# Patient Record
Sex: FEMALE | Race: WHITE | NOT HISPANIC OR LATINO | Employment: FULL TIME | URBAN - METROPOLITAN AREA
[De-identification: names, ages, dates, MRNs, and addresses within clinical notes are randomized per-mention and may not be internally consistent; named-entity substitution may affect disease eponyms.]

---

## 2017-06-07 ENCOUNTER — ALLSCRIPTS OFFICE VISIT (OUTPATIENT)
Dept: OTHER | Facility: OTHER | Age: 57
End: 2017-06-07

## 2017-06-07 DIAGNOSIS — E11.9 TYPE 2 DIABETES MELLITUS WITHOUT COMPLICATIONS (HCC): ICD-10-CM

## 2017-06-07 DIAGNOSIS — E55.9 VITAMIN D DEFICIENCY: ICD-10-CM

## 2017-06-07 DIAGNOSIS — E78.5 HYPERLIPIDEMIA: ICD-10-CM

## 2017-06-12 ENCOUNTER — ALLSCRIPTS OFFICE VISIT (OUTPATIENT)
Dept: OTHER | Facility: OTHER | Age: 57
End: 2017-06-12

## 2018-01-09 NOTE — MISCELLANEOUS
Provider Comments  Provider Comments:   Dear Oswaldo Tejada had a scheduled appointment at our office for 12/19/2016 but were unable to keep  We attempted to call you back but were unable to reach you  It is very important that you follow up with us so that we can assess your physical and nutritional safety after your surgery  Please call our office at [phone] to reschedule your appointment         Sincerely,     [facility]          Signatures   Electronically signed by : Chip Brice, ; Dec 19 2016 10:58AM EST                       (Author)

## 2018-01-14 VITALS
SYSTOLIC BLOOD PRESSURE: 146 MMHG | WEIGHT: 231.38 LBS | HEART RATE: 74 BPM | DIASTOLIC BLOOD PRESSURE: 82 MMHG | BODY MASS INDEX: 34.27 KG/M2 | HEIGHT: 69 IN

## 2018-01-15 VITALS
HEART RATE: 90 BPM | BODY MASS INDEX: 34.14 KG/M2 | DIASTOLIC BLOOD PRESSURE: 80 MMHG | HEIGHT: 69 IN | WEIGHT: 230.5 LBS | RESPIRATION RATE: 16 BRPM | SYSTOLIC BLOOD PRESSURE: 142 MMHG

## 2018-01-16 NOTE — RESULT NOTES
Dear Sienna Johnson,   Your test results have returned and are listed below:      Discussion/Summary  Your results show some abnormalities and are improved compared to the last results  Vitamin D has improved from 18 9 mg/dL to 24 1 mg/dL, but is still low, which can affect your bone health  Recommend that you take 5000 IU of vitamin D3 per day, which is found over the counter  In addition, you need to take 1200 to 1500 mg of calcium per day, in divided doses of 500 to 600 mg each  Your results will be checked again at your annual appointment  Please keep your regularly scheduled follow-up appointment  If you do not have a follow-up scheduled, please call the office to schedule a follow-up visit  If you have any questions, please don't hesitate to call the office  Sincerely,      [address] [address] Dietetic Intern    Electronically signed by : RUBEN Lewis; Apr 6 2016 11:09AM EST                       (Author)    Electronically signed by :  JONO Lane ; Apr 6 2016 11:51AM EST

## 2018-05-07 ENCOUNTER — TELEPHONE (OUTPATIENT)
Dept: BARIATRICS | Facility: CLINIC | Age: 58
End: 2018-05-07

## 2018-05-07 NOTE — TELEPHONE ENCOUNTER
----- Message from Peg Sandhoff, RN sent at 5/3/2018  2:05 PM EDT -----  Regarding: 3 year f/u  Please call patient to schedule 3 year follow up appointment

## 2018-05-08 DIAGNOSIS — I10 BENIGN ESSENTIAL HTN: Primary | ICD-10-CM

## 2018-05-09 RX ORDER — LISINOPRIL 5 MG/1
TABLET ORAL
Qty: 30 TABLET | Refills: 4 | Status: SHIPPED | OUTPATIENT
Start: 2018-05-09 | End: 2018-08-31 | Stop reason: SDUPTHER

## 2018-05-11 ENCOUNTER — TELEPHONE (OUTPATIENT)
Dept: BARIATRICS | Facility: CLINIC | Age: 58
End: 2018-05-11

## 2018-05-11 NOTE — TELEPHONE ENCOUNTER
----- Message from Colleen Brian RN sent at 5/3/2018  2:05 PM EDT -----  Regarding: 3 year f/u  Please call patient to schedule 3 year follow up appointment

## 2018-08-31 DIAGNOSIS — I10 BENIGN ESSENTIAL HTN: ICD-10-CM

## 2018-08-31 RX ORDER — LISINOPRIL 5 MG/1
TABLET ORAL
Qty: 30 TABLET | Refills: 11 | Status: SHIPPED | OUTPATIENT
Start: 2018-08-31 | End: 2019-02-14 | Stop reason: SDUPTHER

## 2018-09-08 DIAGNOSIS — I42.9 CARDIOMYOPATHY (HCC): Primary | ICD-10-CM

## 2018-09-11 RX ORDER — METOPROLOL SUCCINATE 25 MG/1
TABLET, EXTENDED RELEASE ORAL
Qty: 90 TABLET | Refills: 3 | Status: SHIPPED | OUTPATIENT
Start: 2018-09-11 | End: 2019-06-18 | Stop reason: SDUPTHER

## 2019-01-06 ENCOUNTER — OFFICE VISIT (OUTPATIENT)
Dept: URGENT CARE | Facility: MEDICAL CENTER | Age: 59
End: 2019-01-06
Payer: COMMERCIAL

## 2019-01-06 VITALS
HEIGHT: 68 IN | SYSTOLIC BLOOD PRESSURE: 176 MMHG | WEIGHT: 240.2 LBS | BODY MASS INDEX: 36.4 KG/M2 | HEART RATE: 98 BPM | TEMPERATURE: 98.6 F | OXYGEN SATURATION: 97 % | DIASTOLIC BLOOD PRESSURE: 102 MMHG | RESPIRATION RATE: 18 BRPM

## 2019-01-06 DIAGNOSIS — J06.9 ACUTE URI: Primary | ICD-10-CM

## 2019-01-06 LAB — S PYO AG THROAT QL: NEGATIVE

## 2019-01-06 PROCEDURE — 99213 OFFICE O/P EST LOW 20 MIN: CPT | Performed by: PHYSICIAN ASSISTANT

## 2019-01-06 RX ORDER — MELATONIN
1000 DAILY
COMMUNITY

## 2019-01-06 RX ORDER — IBUPROFEN 200 MG
1 CAPSULE ORAL DAILY
COMMUNITY

## 2019-01-06 RX ORDER — DIPHENOXYLATE HYDROCHLORIDE AND ATROPINE SULFATE 2.5; .025 MG/1; MG/1
1 TABLET ORAL DAILY
COMMUNITY

## 2019-01-06 NOTE — PATIENT INSTRUCTIONS
Upper respiratory infection  Coricidin HBP as needed  Follow up with PCP in 3-5 days  Proceed to  ER if symptoms worsen  Upper Respiratory Infection   [place]: Pharyngitis/Tonsillitis  In: Neha MOSER, ed  Neha's Clinical Advisor 2017, [place], [place], 2017  Raúl RIDDLE, Heladio Bermudez, & Yany A: Appropriate Antibiotic Use for Acute Respiratory Tract Infection in Adults: Advice for High-Value Care From the [place] [address] for Disease Control and Prevention  Alyssa Intern Med 2016; Epub:Epub  Elsa Senate: Exposure to cold and acute upper respiratory tract infection  Rhinology 2015; 53(2):  Centers for Disease Control and Prevention (CDC): Nonspecific upper respiratory tract infection: physician information sheet (adults)  Centers for Disease Control and Prevention (CDC)  [place], [address] [place] [address],[address]  Available from URL: MrFebruary uy  As accessed 2017-01-19  Katrina DOE & Neo Oneil AM: Upper Respiratory Infection (URI)  In: Shayne PATTERSON, ed  The 5-Minute Clinical Consult Standard 2016, 24th ed  [address] [place] [address], [address]  FamilyMoneyReefctor  org: Colds and the flu: treatment  [employer]  Yoni Raheem  2014  Available from URL: http://familydoctor  org/familydoctor/en/diseases-conditions/colds-and-the-flu/treatment html  As accessed 2015-06-03  © 2017 [facility] [facility] Information is for End User's use only and may not be sold, redistributed or otherwise used for commercial purposes  All illustrations and images included in CareNotes® are the copyrighted property of A D A M , Inc  or Puma Mcpherson  The above information is an  only  It is not intended as medical advice for individual conditions or treatments   Talk to your doctor, nurse or pharmacist before following any medical regimen to see if it is safe and effective for you

## 2019-01-06 NOTE — PROGRESS NOTES
[facility]        NAME: Bro John is a 59 y o  female  : 1960    MRN: [id number]  DATE: 2019  TIME: 8:34 AM    Assessment and Plan   Acute URI [J06 9]  1  Acute URI           Patient Instructions     Upper respiratory infection  Coricidin HBP as needed  Follow up with PCP in 3-5 days  Proceed to  ER if symptoms worsen  Chief Complaint     Chief Complaint   Patient presents with    Ear Fullness     Pt  C/O ear fullness and cough for the past week   Cough         History of Present Illness       58 y/o female c/o cough productive of white sputum and fullness to ears x 5 days  Patient denies fever, chills, chest pain, SOB, n/v, diaphoresis, dizziness, headache        Review of Systems   Review of Systems   Constitutional: Negative for activity change, appetite change, chills, diaphoresis, fatigue and fever  HENT: Positive for congestion, ear pain, rhinorrhea and sore throat  Negative for ear discharge, facial swelling, hearing loss, mouth sores, nosebleeds, postnasal drip, sinus pain, sinus pressure, sneezing and voice change  Respiratory: Positive for cough  Negative for apnea, choking, chest tightness, shortness of breath, wheezing and stridor  Cardiovascular: Negative            Current Medications       Current Outpatient Prescriptions:     calcium citrate (CALCITRATE) 950 MG tablet, Take 1 tablet by mouth daily, Disp: , Rfl:     cholecalciferol (VITAMIN D3) 1,000 units tablet, Take 1,000 Units by mouth daily, Disp: , Rfl:     cyanocobalamin 100 MCG tablet, Take 100 mcg by mouth daily, Disp: , Rfl:     lisinopril (ZESTRIL) 5 mg tablet, TAKE 1 TABLET BY MOUTH EVERY DAY, Disp: 30 tablet, Rfl: 11    metoprolol succinate (TOPROL-XL) 25 mg 24 hr tablet, TAKE 1 TABLET BY MOUTH EVERY DAY, Disp: 90 tablet, Rfl: 3    multivitamin (THERAGRAN) TABS, Take 1 tablet by mouth daily, Disp: , Rfl:     Current Allergies     Allergies as of 2019    (No Known Allergies) The following portions of the patient's history were reviewed and updated as appropriate: allergies, current medications, past family history, past medical history, past social history, past surgical history and problem list      Past Medical History:   Diagnosis Date    Hypertension        No past surgical history on file  No family history on file  Medications have been verified  Objective   BP (!) 176/102   Pulse 98   Temp 98 6 °F (37 °C) (Temporal)   Resp 18   Ht 5' 8" (1 727 m)   Wt 109 kg (240 lb 3 2 oz)   SpO2 97%   BMI 36 52 kg/m²        Physical Exam     Physical Exam   Constitutional: She appears well-developed and well-nourished  No distress  HENT:   Head: Normocephalic and atraumatic  Right Ear: Hearing, tympanic membrane, external ear and ear canal normal    Left Ear: Hearing, tympanic membrane, external ear and ear canal normal    Nose: Rhinorrhea present  Mouth/Throat: Uvula is midline, oropharynx is clear and moist and mucous membranes are normal    Neck: Normal range of motion  Neck supple  Cardiovascular: Normal rate, regular rhythm, normal heart sounds and intact distal pulses  Pulmonary/Chest: Effort normal and breath sounds normal    Lymphadenopathy:     She has cervical adenopathy  Skin: She is not diaphoretic           Patient advised to stop taking decongestants and follow up with PMD for monitoring of BP

## 2019-01-24 ENCOUNTER — TELEPHONE (OUTPATIENT)
Dept: FAMILY MEDICINE CLINIC | Facility: MEDICAL CENTER | Age: 59
End: 2019-01-24

## 2019-01-24 ENCOUNTER — OFFICE VISIT (OUTPATIENT)
Dept: FAMILY MEDICINE CLINIC | Facility: MEDICAL CENTER | Age: 59
End: 2019-01-24
Payer: COMMERCIAL

## 2019-01-24 VITALS
WEIGHT: 235.13 LBS | HEIGHT: 68 IN | BODY MASS INDEX: 35.63 KG/M2 | HEART RATE: 90 BPM | DIASTOLIC BLOOD PRESSURE: 82 MMHG | TEMPERATURE: 98.6 F | RESPIRATION RATE: 16 BRPM | SYSTOLIC BLOOD PRESSURE: 176 MMHG

## 2019-01-24 DIAGNOSIS — I10 BENIGN ESSENTIAL HYPERTENSION: ICD-10-CM

## 2019-01-24 DIAGNOSIS — J01.00 ACUTE MAXILLARY SINUSITIS, RECURRENCE NOT SPECIFIED: Primary | ICD-10-CM

## 2019-01-24 DIAGNOSIS — E66.9 CLASS 2 OBESITY WITHOUT SERIOUS COMORBIDITY WITH BODY MASS INDEX (BMI) OF 35.0 TO 35.9 IN ADULT, UNSPECIFIED OBESITY TYPE: ICD-10-CM

## 2019-01-24 PROCEDURE — 99213 OFFICE O/P EST LOW 20 MIN: CPT | Performed by: FAMILY MEDICINE

## 2019-01-24 RX ORDER — AMOXICILLIN AND CLAVULANATE POTASSIUM 875; 125 MG/1; MG/1
1 TABLET, FILM COATED ORAL EVERY 12 HOURS SCHEDULED
Qty: 14 TABLET | Refills: 0 | Status: SHIPPED | OUTPATIENT
Start: 2019-01-24 | End: 2019-01-31

## 2019-01-24 RX ORDER — TRIAMCINOLONE ACETONIDE 1 MG/G
CREAM TOPICAL 2 TIMES DAILY
COMMUNITY
Start: 2017-06-07

## 2019-01-24 NOTE — TELEPHONE ENCOUNTER
Patient could not make an appt in 2 weeks with Dr Rudy Beth  Appt is schedule 02/19/19      Routed to Dr Juancarlos Herrera

## 2019-01-24 NOTE — PROGRESS NOTES
Andres Viera was seen at urgent care 1/6/19  She ahd started with a cold  Diagnosed with URI  She  has had continued facial pressure and earache   Still with post nasal drip and sore throat  No cough  No eye symptoms  Took Cold and Sinus med OTC for a few days  Thinks elevated her BP  She has not been seen here since 2016  She has seen Cardiology but she is overdue for follow-up for her atrial fibrillation  She has been taking her lisinopril and metoprolol  She is using  CBD all which she finds helpful for her knee pain      O: BP (!) 176/82   Pulse 90   Temp 98 6 °F (37 °C) (Oral)   Resp 16   Ht 5' 8" (1 727 m)   Wt 107 kg (235 lb 2 oz)   BMI 35 75 kg/m²    BP by me 168/84 right arm standard cuff                    168/86 right arm large cuff    Assessment  1  Sinusitis  2  Paroxysmal atrial fibrillation-encouraged follow-up with Cardiology  3  Hypertension-suboptimal control  Questions CBD oil and or decongestant affect  She is going to stop this and see how her blood pressure does  In any case she will follow up with Cardiology for management for this  Plan  Rx for Augmentin  Avoid over-the-counter decongestant    Follow-up with Cardiology as above

## 2019-01-25 NOTE — TELEPHONE ENCOUNTER
Patient aware, says that she is already off of the oil and is not taking any decongestants  Will call next week with her bp readings

## 2019-01-25 NOTE — TELEPHONE ENCOUNTER
Let her know I would go ahead and stop the oil as we discussed and not take any over-the-counter decongestants for a week  Should then call us with her blood pressure readings  At that time may increase her meds

## 2019-02-05 ENCOUNTER — TELEPHONE (OUTPATIENT)
Dept: FAMILY MEDICINE CLINIC | Facility: MEDICAL CENTER | Age: 59
End: 2019-02-05

## 2019-02-05 NOTE — TELEPHONE ENCOUNTER
Patient called the office to provider BP Reading log  Patient discontinued the CBD oil     01/26/19 170/99   Pulse 86    01/27/19 148/97   Pulse 91    01/28/19 157/87  Pulse 71    01/29/19 143/88  Pulse 76    02/04/19 153/90  Pulse 76    Patient would like to go back on the CBD oil since this helps with her knee

## 2019-02-07 NOTE — TELEPHONE ENCOUNTER
Routed to [facility] Bipolar 1 disorder    BPH (benign prostatic hyperplasia)    CHF (congestive heart failure)    Depression    HTN (hypertension)    Insomnia    Pleural effusion no

## 2019-02-07 NOTE — TELEPHONE ENCOUNTER
Patient is aware of message above however she stated her appt with Cardiology is not until 02/18/19 and she was told at her office visit if she could not get in right away Dr Hussein Alexander may change the medication  Patient wants to know if the medication will be changed?

## 2019-02-08 NOTE — TELEPHONE ENCOUNTER
I do not think that these blood pressures are an issue until she sees him in 10 days  However she could increase her Zestril and take 2 daily   She  should let Dr Fer Montenegro know we did this ; he can always make other changes if necessary

## 2019-02-11 ENCOUNTER — TELEPHONE (OUTPATIENT)
Dept: CARDIOLOGY CLINIC | Facility: CLINIC | Age: 59
End: 2019-02-11

## 2019-02-11 NOTE — TELEPHONE ENCOUNTER
Brandi Driscoll has a 1 yr f/u with you on 2/19  Called to report elevated BP starting a few weeks ago  Had been having systolic readings in the 34H  Her PCP increased her lisinopril dose from 5 mg daily to 10 mg/d  Increased on Friday 2/8  Last night, reports her BP was 166/104  Also taking toprol xl 25 mg daily  Prior to BP increase, Brandi Driscoll had been taking a decongestant for a cold and cbd oil for knee pain  She stopped both of those per PCP recommendations, but did go back on the cbd oil on Friday  Feels ok, but is still worried about BP    Please advise of any recommendations prior to ov on 2/19

## 2019-02-11 NOTE — TELEPHONE ENCOUNTER
I will just ask her every couple days to go up on the lisinopril by another 10 mg, maximum 40 mg daily, if her blood pressure stays above 150/90    I will be seeing her next week Tuesday, hopefully things will settle down now that she is off the decongestants

## 2019-02-13 DIAGNOSIS — I10 BENIGN ESSENTIAL HTN: ICD-10-CM

## 2019-02-13 NOTE — TELEPHONE ENCOUNTER
Spoke with Nadine Marr  Gave instructions per Dr Ary Bryant  Verbalized understanding  Pt requested refill on lisinopril  Will order 10 mg tabs until seen by Dr Татьяна Meyers

## 2019-02-14 RX ORDER — LISINOPRIL 10 MG/1
10 TABLET ORAL DAILY
Qty: 30 TABLET | Refills: 11 | Status: SHIPPED | OUTPATIENT
Start: 2019-02-14 | End: 2019-02-19

## 2019-02-19 ENCOUNTER — OFFICE VISIT (OUTPATIENT)
Dept: CARDIOLOGY CLINIC | Facility: CLINIC | Age: 59
End: 2019-02-19
Payer: COMMERCIAL

## 2019-02-19 VITALS
DIASTOLIC BLOOD PRESSURE: 78 MMHG | BODY MASS INDEX: 34.39 KG/M2 | WEIGHT: 226.9 LBS | HEART RATE: 97 BPM | OXYGEN SATURATION: 93 % | HEIGHT: 68 IN | SYSTOLIC BLOOD PRESSURE: 164 MMHG

## 2019-02-19 DIAGNOSIS — E78.00 PURE HYPERCHOLESTEROLEMIA: ICD-10-CM

## 2019-02-19 DIAGNOSIS — I10 BENIGN ESSENTIAL HYPERTENSION: ICD-10-CM

## 2019-02-19 DIAGNOSIS — I48.91 ATRIAL FIBRILLATION, UNSPECIFIED TYPE (HCC): Primary | ICD-10-CM

## 2019-02-19 PROCEDURE — 93000 ELECTROCARDIOGRAM COMPLETE: CPT | Performed by: INTERNAL MEDICINE

## 2019-02-19 PROCEDURE — 99214 OFFICE O/P EST MOD 30 MIN: CPT | Performed by: INTERNAL MEDICINE

## 2019-02-19 RX ORDER — LISINOPRIL AND HYDROCHLOROTHIAZIDE 25; 20 MG/1; MG/1
1 TABLET ORAL DAILY
Qty: 30 TABLET | Refills: 11 | Status: SHIPPED | OUTPATIENT
Start: 2019-02-19 | End: 2020-02-10 | Stop reason: SDUPTHER

## 2019-02-19 NOTE — PATIENT INSTRUCTIONS
Low-Sodium Diet   AMBULATORY CARE:   A low-sodium diet  limits foods that are high in sodium (salt)  You will need to follow a low-sodium diet if you have high blood pressure, kidney disease, or heart failure  You may also need to follow this diet if you have a condition that is causing your body to retain (hold) extra fluid  You may need to limit the amount of sodium you eat to 1,500 mg  Ask your healthcare provider how much sodium you can have each day  How to use food labels to choose foods that are low in sodium:  Read food labels to find the amount of sodium they contain  The amount of sodium is listed in milligrams (mg)  The % Daily Value (DV) column tells you how much of your daily needs are met by 1 serving of the food for each nutrient listed  Choose foods that have less than 5% of the DV of sodium  These foods are considered low in sodium  Foods that have 20% or more of the DV of sodium are considered high in sodium  Some food labels may also list any of the following terms that tell you about the sodium content in the food:  · Sodium-free:  Less than 5 mg in each serving    · Very low sodium:  35 mg of sodium or less in each serving    · Low sodium:  140 mg of sodium or less in each serving    · Reduced sodium: At least 25% less sodium in each serving than the regular type    · Light in sodium:  50% less sodium in each serving    · Unsalted or no added salt:  No extra salt is added during processing (the food may still contain sodium)  Foods to avoid:  Salty foods are high in sodium   You should avoid the following:  · Processed foods:      ¨ Mixes for cornbread, biscuits, cake, and pudding     ¨ Instant foods, such as potatoes, cereals, noodles, and rice     ¨ Packaged foods, such as bread stuffing, rice and pasta mixes, snack dip mixes, and macaroni and cheese     ¨ Canned foods, such as canned vegetables, soups, broths, sauces, and vegetable or tomato juice    ¨ Snack foods, such as salted chips, popcorn, pretzels, pork rinds, salted crackers, and salted nuts    ¨ Frozen foods, such as dinners, entrees, vegetables with sauces, and breaded meats    ¨ Sauerkraut, pickled vegetables, and other foods prepared in brine    · Meats and cheeses:      ¨ Smoked or cured meat, such as corned beef, mcfarlane, ham, hot dogs, and sausage    ¨ Canned meats or spreads, such as potted meats, sardines, anchovies, and imitation seafood    ¨ Deli or lunch meats, such as bologna, ham, turkey, and roast beef    ¨ Processed cheese, such as American cheese and cheese spreads    · Condiments, sauces, and seasonings:      ¨ Salt (¼ teaspoon of salt contains 575 mg of sodium)    ¨ Seasonings made with salt, such as garlic salt, celery salt, onion salt, and seasoned salt    ¨ Regular soy sauce, barbecue sauce, teriyaki sauce, steak sauce, Worcestershire sauce, and most flavored vinegars    ¨ Canned gravy and mixes     ¨ Regular condiments, such as mustard, ketchup, and salad dressings    ¨ Pickles and olives    ¨ Meat tenderizers and monosodium glutamate (MSG)  Foods to include:  Read the food label to find the exact amount of sodium in each serving  · Bread and cereal:  Try to choose breads with less than 80 mg of sodium per serving  ¨ Bread, roll, kev, tortilla, or unsalted crackers  ¨ Ready-to-eat cereals with less than 5% DV of sodium (examples include shredded wheat and puffed rice)    ¨ Pasta    · Vegetables and fruits:      ¨ Unsalted fresh, frozen, or canned vegetables    ¨ Fresh, frozen, or canned fruits    ¨ Fruit juice    · Dairy:  One serving has about 150 mg of sodium  ¨ Milk, all types    ¨ Yogurt    ¨ Hard cheese, such as cheddar, Swiss, [employer], or mozzarella    · Meat and other protein foods:  Some raw meats may have added sodium       ¨ Plain meats, fish, and poultry     ¨ Eggs    · Other foods:      ¨ Homemade pudding    ¨ Unsalted nuts, popcorn, or pretzels    ¨ Unsalted butter or margarine  Ways to decrease sodium:   · Add spices and herbs to foods instead of salt during cooking  Use salt-free seasonings to add flavor to foods  Examples include onion powder, garlic powder, basil, farrell powder, paprika, and parsley  Try lemon or lime juice or vinegar to give foods a tart flavor  Use hot peppers, pepper, or cayenne pepper to add a spicy flavor to foods  · Do not keep a salt shaker at your kitchen table  This may help keep you from adding salt to food at the table  It may take time to get used to enjoying the natural flavor of food instead of adding salt  Talk to your healthcare provider before you use salt substitutes  Some salt substitutes have a high amount of potassium and need to be avoided if you have kidney disease  · Choose low-sodium foods at restaurants  Meals from restaurants are often high in sodium  Some restaurants have nutrition information on the menu that tells you the amount of sodium in their foods  If possible, ask for your food to be prepared with less, or no salt  · Shop for unsalted or low-sodium foods and snacks at the grocery store  Examples include unsalted or low-sodium broths, soups, and canned vegetables  Choose fresh or frozen vegetables instead  Choose unsalted nuts or seeds or fresh fruits or vegetables as snacks  Read food labels and choose salt-free, very low-sodium, or low-sodium foods  © 2017 [address] Information is for End User's use only and may not be sold, redistributed or otherwise used for commercial purposes  All illustrations and images included in CareNotes® are the copyrighted property of A D A M , Inc  or Puma Mcpherson  The above information is an  only  It is not intended as medical advice for individual conditions or treatments  Talk to your doctor, nurse or pharmacist before following any medical regimen to see if it is safe and effective for you            Hypertension, Ambulatory Care   GENERAL INFORMATION:   Hypertension  is high blood pressure (BP)  Your BP is the force of your blood moving against the walls of your arteries  Hypertension is a BP of 140/90 or higher  Hypertension causes your BP to get so high that your heart has to work much harder than normal  This can cause damage to your heart  Common symptoms include the following:   · Headache     · Blurred vision     · Chest pain     · Dizziness or weakness     · Trouble breathing    · Nosebleeds  Seek immediate care for the following symptoms:   · Severe headache or vision loss    · Weakness in an arm or leg    · Confusion or difficulty speaking    · Discomfort in your chest that feels like squeezing, pressure, fullness, or pain    · Suddenly feeling lightheaded or trouble breathing    · Pain or discomfort in your back, neck, jaw, stomach, or arm  Treatment for hypertension  may include medicine to lower your BP  You may also need to make lifestyle changes  Take your medicine exactly as directed  Manage hypertension:   · Take your BP at home  Sit and rest for 5 minutes before you take your BP  Extend your arm and support it on a flat surface  Your arm should be at the same level as your heart  Follow the directions that came with your BP monitor  If possible, take at least 2 BP readings each time  Take your BP at least twice a day at the same times each day, such as morning and evening  Keep a log of your BP readings and bring it to your follow-up visits  · Eat less sodium (salt)  Do not add sodium to your food  Limit foods that are high in sodium, such as canned foods, potato chips, and cold cuts  Your healthcare provider may suggest that you follow the [facility] [address]  The plan is low in sodium, unhealthy fats, and total fat  It is high in potassium, calcium, and fiber  · Exercise regularly  Exercise at least 30 minutes per day, on most days of the week  This will help decrease your BP   Ask your healthcare provider about the best exercise plan for you  · Limit alcohol  Women should limit alcohol to 1 drink a day  Men should limit alcohol to 2 drinks a day  A drink of alcohol is 12 ounces of beer, 5 ounces of wine, or 1½ ounces of liquor  · Do not smoke  If you smoke, it is never too late to quit  Smoking can increase your BP  Smoking also worsens other health conditions you may have that can increase your risk for hypertension  Ask your healthcare provider for information if you need help quitting  Follow up with your healthcare provider as directed: You will need to return to have your BP checked and to have other lab tests done  Write down your questions so you remember to ask them during your visits  CARE AGREEMENT:   You have the right to help plan your care  Learn about your health condition and how it may be treated  Discuss treatment options with your caregivers to decide what care you want to receive  You always have the right to refuse treatment  The above information is an  only  It is not intended as medical advice for individual conditions or treatments  Talk to your doctor, nurse or pharmacist before following any medical regimen to see if it is safe and effective for you  © 2014 [address] is for End User's use only and may not be sold, redistributed or otherwise used for commercial purposes  All illustrations and images included in CareNotes® are the copyrighted property of A D A M , Inc  or Puma Mcpherson

## 2019-02-19 NOTE — PROGRESS NOTES
[facility]  Follow up note  Susy De La Cruz 59 y o  female MRN: [id number]        Problems    1  Atrial fibrillation, unspecified type ([address] )  POCT ECG   2  Pure hypercholesterolemia     3  Benign essential hypertension         Impression:    Magalis De Anda has been having an issue with some resistant blood pressure recently, she definitely had some poor dietary choices with chicken salad, pretzels etcetera, probably overloaded with Sodium  We increased her lisinopril to 10 mg daily, she has since increased based on blood pressure parameters given to her last month, and blood pressure still uncontrolled on 30 mg daily  From a lipid standpoint, simvastatin was previously discontinued by her PCP  She has a remote history of PAF, no recurrence, in normal sinus rhythm today    Plan:    Stop lisinopril  Start lisinopril/HCTZ  Blood work has already been ordered by her PCP for a visit anticipated 3/14/2019  If her blood pressure is well controlled her PCPs visit, I requested she ask them for a 3 month supply of her lisinopril/HCTZ, or call my office for a supply  HPI:   Susy De La Cruz is a 59y o  year old female with a history of severe obesity status post bariatric surgery remotely, a remote history of PAF in the setting of obesity, without any recurrence, hypertension and hyperlipidemia  She has been off simvastatin since last year at the discretion of her PCP, and typically blood pressure has been borderline, she was started on lisinopril at my last visit about a year ago  More recently she started taking CBD oil in about 8/18, and changed her diet for the worse, with a lot of high sodium in gradients over the winter, now noting significantly uncontrolled blood pressures  We had her titrate her lisinopril up to 30 mg daily, but blood pressures are still uncontrolled mostly 140s over 90s  She stopped taking the CBD oil as well    She is now selling CBD oil, and states that per the education she has received supposed to lower her blood pressure  She has tried to make some adjustments to her dietary sodium intake, and has noted some improvement in her blood pressure  She denies chest pressure, shortness of breath, lightheadedness  She does have some orthopedic knee issues, and this does limit her exercise but she has try to get back into exercising recently  Review of Systems   HENT: Negative  Eyes: Negative  Respiratory: Negative for chest tightness and shortness of breath  Cardiovascular: Negative for chest pain, palpitations and leg swelling  Musculoskeletal: Positive for arthralgias and joint swelling           Past Medical History:   Diagnosis Date    Hypertension      Social History     Substance and Sexual Activity   Alcohol Use Not on file     Social History     Substance and Sexual Activity   Drug Use Not on file     Social History     Tobacco Use   Smoking Status Former Smoker   Smokeless Tobacco Never Used       Allergies:  No Known Allergies    Medications:     Current Outpatient Medications:     calcium citrate (CALCITRATE) 950 MG tablet, Take 1 tablet by mouth daily, Disp: , Rfl:     cholecalciferol (VITAMIN D3) 1,000 units tablet, Take 1,000 Units by mouth daily, Disp: , Rfl:     cyanocobalamin 100 MCG tablet, Take 100 mcg by mouth every other day , Disp: , Rfl:     lisinopril (ZESTRIL) 10 mg tablet, Take 1 tablet (10 mg total) by mouth daily (Patient taking differently: Take 30 mg by mouth daily ), Disp: 30 tablet, Rfl: 11    metoprolol succinate (TOPROL-XL) 25 mg 24 hr tablet, TAKE 1 TABLET BY MOUTH EVERY DAY, Disp: 90 tablet, Rfl: 3    multivitamin (THERAGRAN) TABS, Take 1 tablet by mouth daily, Disp: , Rfl:     triamcinolone (KENALOG) 0 1 % cream, Apply topically Twice daily, Disp: , Rfl:     Nutritional Supplements (QUINOA KALE & HEMP PO), Take by mouth, Disp: , Rfl:       Vitals:    02/19/19 0906   BP: 164/78   Pulse: 97   SpO2: 93%     Weight (last 2 days)     Date/Time Weight    02/19/19 0906   103 (226 9)            Physical Exam   Constitutional: No distress  HENT:   Head: Normocephalic and atraumatic  Eyes: Conjunctivae are normal  No scleral icterus  Neck: Normal range of motion  No JVD present  Cardiovascular: Normal rate, regular rhythm, normal heart sounds and intact distal pulses  No murmur heard  Pulmonary/Chest: Effort normal and breath sounds normal  No respiratory distress  She has no wheezes  She has no rales  Musculoskeletal: She exhibits no edema or tenderness  Skin: Skin is warm and dry  She is not diaphoretic  Laboratory Studies:  NT-proBNP: No results for input(s): NTBNP in the last 72 hours  Chemistries: No results for input(s): NA, K, CL, CO2, BUN in the last 72 hours  Invalid input(s): CREA, GLU  Lipid Profile:   Lab Results   Component Value Date    CHOL 149 08/01/2015    CHOL 142 04/25/2015    CHOL 188 09/13/2014     Lab Results   Component Value Date    HDL 50 03/24/2016    HDL 59 08/01/2015    HDL 50 04/25/2015     Lab Results   Component Value Date    LDLCALC 98 03/24/2016    LDLCALC 75 08/01/2015    LDLCALC 79 04/25/2015     Lab Results   Component Value Date    TRIG 89 03/24/2016    TRIG 73 08/01/2015    TRIG 64 04/25/2015     Lab Results   Component Value Date    CHOL 149 08/01/2015    HDL 50 03/24/2016    HDL 59 08/01/2015    TRIG 89 03/24/2016    TRIG 73 08/01/2015     No results for input(s): CHOL, LDLDIRECT, HDL, TRIG in the last 72 hours  Cardiac testing:     EKG reviewed personally:  Normal sinus rhythm, normal EKG      Marzena Candelaria MD    Portions of the record may have been created with voice recognition software   Occasional wrong word or "sound a like" substitutions may have occurred due to the inherent limitations of voice recognition software   Read the chart carefully and recognize, using context, where substitutions have occurred

## 2019-03-09 ENCOUNTER — APPOINTMENT (OUTPATIENT)
Dept: LAB | Facility: MEDICAL CENTER | Age: 59
End: 2019-03-09
Payer: COMMERCIAL

## 2019-03-09 DIAGNOSIS — I10 BENIGN ESSENTIAL HYPERTENSION: ICD-10-CM

## 2019-03-09 DIAGNOSIS — J01.00 ACUTE MAXILLARY SINUSITIS, RECURRENCE NOT SPECIFIED: ICD-10-CM

## 2019-03-09 DIAGNOSIS — E66.9 CLASS 2 OBESITY WITHOUT SERIOUS COMORBIDITY WITH BODY MASS INDEX (BMI) OF 35.0 TO 35.9 IN ADULT, UNSPECIFIED OBESITY TYPE: ICD-10-CM

## 2019-03-09 LAB
ALBUMIN SERPL BCP-MCNC: 3.5 G/DL (ref 3.5–5)
ALP SERPL-CCNC: 81 U/L (ref 46–116)
ALT SERPL W P-5'-P-CCNC: 35 U/L (ref 12–78)
ANION GAP SERPL CALCULATED.3IONS-SCNC: 6 MMOL/L (ref 4–13)
AST SERPL W P-5'-P-CCNC: 20 U/L (ref 5–45)
BILIRUB SERPL-MCNC: 0.51 MG/DL (ref 0.2–1)
BUN SERPL-MCNC: 39 MG/DL (ref 5–25)
CALCIUM SERPL-MCNC: 8.9 MG/DL (ref 8.3–10.1)
CHLORIDE SERPL-SCNC: 98 MMOL/L (ref 100–108)
CHOLEST SERPL-MCNC: 214 MG/DL (ref 50–200)
CO2 SERPL-SCNC: 30 MMOL/L (ref 21–32)
CREAT SERPL-MCNC: 0.75 MG/DL (ref 0.6–1.3)
CREAT UR-MCNC: 79.1 MG/DL
ERYTHROCYTE [DISTWIDTH] IN BLOOD BY AUTOMATED COUNT: 12.1 % (ref 11.6–15.1)
EST. AVERAGE GLUCOSE BLD GHB EST-MCNC: 166 MG/DL
GFR SERPL CREATININE-BSD FRML MDRD: 87 ML/MIN/1.73SQ M
GLUCOSE P FAST SERPL-MCNC: 192 MG/DL (ref 65–99)
HBA1C MFR BLD: 7.4 % (ref 4.2–6.3)
HCT VFR BLD AUTO: 39.5 % (ref 34.8–46.1)
HDLC SERPL-MCNC: 58 MG/DL (ref 40–60)
HGB BLD-MCNC: 13.1 G/DL (ref 11.5–15.4)
LDLC SERPL CALC-MCNC: 132 MG/DL (ref 0–100)
MCH RBC QN AUTO: 30.3 PG (ref 26.8–34.3)
MCHC RBC AUTO-ENTMCNC: 33.2 G/DL (ref 31.4–37.4)
MCV RBC AUTO: 91 FL (ref 82–98)
MICROALBUMIN UR-MCNC: 83.8 MG/L (ref 0–20)
MICROALBUMIN/CREAT 24H UR: 106 MG/G CREATININE (ref 0–30)
NONHDLC SERPL-MCNC: 156 MG/DL
PLATELET # BLD AUTO: 205 THOUSANDS/UL (ref 149–390)
PMV BLD AUTO: 11.9 FL (ref 8.9–12.7)
POTASSIUM SERPL-SCNC: 3.9 MMOL/L (ref 3.5–5.3)
PROT SERPL-MCNC: 7.4 G/DL (ref 6.4–8.2)
RBC # BLD AUTO: 4.32 MILLION/UL (ref 3.81–5.12)
SODIUM SERPL-SCNC: 134 MMOL/L (ref 136–145)
TRIGL SERPL-MCNC: 119 MG/DL
TSH SERPL DL<=0.05 MIU/L-ACNC: 2.84 UIU/ML (ref 0.36–3.74)
WBC # BLD AUTO: 5.61 THOUSAND/UL (ref 4.31–10.16)

## 2019-03-09 PROCEDURE — 84443 ASSAY THYROID STIM HORMONE: CPT

## 2019-03-09 PROCEDURE — 82043 UR ALBUMIN QUANTITATIVE: CPT | Performed by: FAMILY MEDICINE

## 2019-03-09 PROCEDURE — 80053 COMPREHEN METABOLIC PANEL: CPT

## 2019-03-09 PROCEDURE — 85027 COMPLETE CBC AUTOMATED: CPT

## 2019-03-09 PROCEDURE — 36415 COLL VENOUS BLD VENIPUNCTURE: CPT

## 2019-03-09 PROCEDURE — 80061 LIPID PANEL: CPT

## 2019-03-09 PROCEDURE — 83036 HEMOGLOBIN GLYCOSYLATED A1C: CPT

## 2019-03-09 PROCEDURE — 82570 ASSAY OF URINE CREATININE: CPT | Performed by: FAMILY MEDICINE

## 2019-03-14 ENCOUNTER — OFFICE VISIT (OUTPATIENT)
Dept: FAMILY MEDICINE CLINIC | Facility: MEDICAL CENTER | Age: 59
End: 2019-03-14
Payer: COMMERCIAL

## 2019-03-14 VITALS
SYSTOLIC BLOOD PRESSURE: 126 MMHG | HEART RATE: 70 BPM | RESPIRATION RATE: 16 BRPM | BODY MASS INDEX: 34.17 KG/M2 | WEIGHT: 225.5 LBS | DIASTOLIC BLOOD PRESSURE: 70 MMHG | HEIGHT: 68 IN

## 2019-03-14 DIAGNOSIS — E78.00 PURE HYPERCHOLESTEROLEMIA: ICD-10-CM

## 2019-03-14 DIAGNOSIS — R73.09 ELEVATED GLUCOSE: ICD-10-CM

## 2019-03-14 DIAGNOSIS — Z00.00 ROUTINE ADULT HEALTH MAINTENANCE: ICD-10-CM

## 2019-03-14 DIAGNOSIS — I10 BENIGN ESSENTIAL HYPERTENSION: ICD-10-CM

## 2019-03-14 DIAGNOSIS — Z23 NEED FOR SHINGLES VACCINE: ICD-10-CM

## 2019-03-14 DIAGNOSIS — E66.9 CLASS 2 OBESITY WITHOUT SERIOUS COMORBIDITY WITH BODY MASS INDEX (BMI) OF 35.0 TO 35.9 IN ADULT, UNSPECIFIED OBESITY TYPE: ICD-10-CM

## 2019-03-14 DIAGNOSIS — Z13.820 SCREENING FOR OSTEOPOROSIS: Primary | ICD-10-CM

## 2019-03-14 PROCEDURE — 99396 PREV VISIT EST AGE 40-64: CPT | Performed by: FAMILY MEDICINE

## 2019-03-14 RX ORDER — LISINOPRIL 20 MG/1
20 TABLET ORAL DAILY
COMMUNITY
End: 2019-04-01 | Stop reason: SDUPTHER

## 2019-03-14 NOTE — PROGRESS NOTES
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  Colonoscopy 2013 10 year f/u  Mammogram due   Gyn exam Dr Bhavani Meredith     I have reviewed the patient's medical history in detail and updated the computerized patient record  Review of Systems   Respiratory: Negative for chest tightness and shortness of breath  Cardiovascular: Negative for chest pain, palpitations and leg swelling  Gastrointestinal: Negative for abdominal pain  Neurological: Negative for dizziness  O: /70 (BP Location: Left arm, Patient Position: Sitting, Cuff Size: Large)   Pulse 70   Resp 16   Ht 5' 7 5" (1 715 m)   Wt 102 kg (225 lb 8 oz)   BMI 34 80 kg/m²   Physical Exam   Constitutional: She is oriented to person, place, and time  She appears well-developed and well-nourished  HENT:   Head: Normocephalic  Right Ear: External ear normal    Left Ear: External ear normal    Nose: Nose normal    Mouth/Throat: Oropharynx is clear and moist    Eyes: Pupils are equal, round, and reactive to light  Conjunctivae and EOM are normal  No scleral icterus  Neck: Normal range of motion  Neck supple  Carotid bruit is not present  No thyroid mass and no thyromegaly present  Cardiovascular: Normal rate, regular rhythm, normal heart sounds and intact distal pulses  Pulses:       Femoral pulses are 2+ on the right side, and 2+ on the left side         Popliteal pulses are 2+ on the right side, and 2+ on the left side  Dorsalis pedis pulses are 2+ on the right side, and 2+ on the left side  Posterior tibial pulses are 2+ on the right side, and 2+ on the left side  Pulmonary/Chest: Effort normal and breath sounds normal  No respiratory distress  She has no wheezes  She has no rales  Abdominal: Soft  Normal aorta and bowel sounds are normal  She exhibits no distension and no mass  There is no hepatosplenomegaly  There is no tenderness  Musculoskeletal: Normal range of motion  She exhibits no edema  Lymphadenopathy:        Head (right side): No submandibular and no occipital adenopathy present  Head (left side): No submandibular and no occipital adenopathy present  She has no cervical adenopathy  Right: No inguinal and no supraclavicular adenopathy present  Left: No inguinal and no supraclavicular adenopathy present  Neurological: She is oriented to person, place, and time  Skin: No lesion and no rash noted  Psychiatric: She has a normal mood and affect  Her behavior is normal      BW 03/09/2019   CBC CMP TSH within normal limits  A1c 7 4    micral 106 cholesterol 214      Assessment  1  Diabetes-her diabetes was resolved after her bariatric surgery for several years  Her A1c remained 6-6 3  This most recent A1c is 7 4  I recommended going back on metformin however she would like to 1st try to see if she can diet control  I think this is reasonable since she seems motivated and she has done before  Her micral is elevated however she also has hypertension which has been uncontrolled and she is on an ACE inhibitor  She will continue with her dietary changes and exercise and will do an A1c in 3 months  2  Hyperlipidemia-had also resolved after her bariatric surgery  Likely related to her dietary indiscretion  She would also like to try to diet control as above and reassess the need for statin    If diabetes persists she certainly will need to go back on it   3  Obesity-as above  4  Hypertension-now controlled with lisinopril and lisinopril HCTZ and metoprolol  5  History of paroxysmal atrial fibrillation-currently normal sinus rhythm  She will be following up with Cardiology  6    Health maintenance-discussedShingrix  Declines pneumonia shots although will recommend again if diabetes persists  Advised DEXA scan mammogram     Plan  Check DEXA scan  Follow up with gyn  A1c and recheck in 3 months

## 2019-04-01 DIAGNOSIS — I15.9 SECONDARY HYPERTENSION: Primary | ICD-10-CM

## 2019-04-01 RX ORDER — LISINOPRIL 20 MG/1
20 TABLET ORAL DAILY
Qty: 90 TABLET | Refills: 2 | Status: SHIPPED | OUTPATIENT
Start: 2019-04-01 | End: 2019-06-18 | Stop reason: SDUPTHER

## 2019-06-18 ENCOUNTER — OFFICE VISIT (OUTPATIENT)
Dept: CARDIOLOGY CLINIC | Facility: CLINIC | Age: 59
End: 2019-06-18
Payer: COMMERCIAL

## 2019-06-18 VITALS
DIASTOLIC BLOOD PRESSURE: 74 MMHG | HEIGHT: 68 IN | HEART RATE: 88 BPM | BODY MASS INDEX: 33.86 KG/M2 | WEIGHT: 223.4 LBS | OXYGEN SATURATION: 91 % | SYSTOLIC BLOOD PRESSURE: 126 MMHG

## 2019-06-18 DIAGNOSIS — I15.9 SECONDARY HYPERTENSION: ICD-10-CM

## 2019-06-18 DIAGNOSIS — I48.91 ATRIAL FIBRILLATION, UNSPECIFIED TYPE (HCC): Primary | ICD-10-CM

## 2019-06-18 DIAGNOSIS — I42.9 CARDIOMYOPATHY (HCC): ICD-10-CM

## 2019-06-18 DIAGNOSIS — E78.00 PURE HYPERCHOLESTEROLEMIA: ICD-10-CM

## 2019-06-18 DIAGNOSIS — I10 BENIGN ESSENTIAL HYPERTENSION: ICD-10-CM

## 2019-06-18 PROCEDURE — 99214 OFFICE O/P EST MOD 30 MIN: CPT | Performed by: INTERNAL MEDICINE

## 2019-06-18 PROCEDURE — 93000 ELECTROCARDIOGRAM COMPLETE: CPT | Performed by: INTERNAL MEDICINE

## 2019-06-18 RX ORDER — LISINOPRIL 20 MG/1
20 TABLET ORAL DAILY
Qty: 90 TABLET | Refills: 3 | Status: SHIPPED | OUTPATIENT
Start: 2019-06-18 | End: 2020-09-16

## 2019-06-18 RX ORDER — METOPROLOL SUCCINATE 25 MG/1
25 TABLET, EXTENDED RELEASE ORAL DAILY
Qty: 90 TABLET | Refills: 3 | Status: SHIPPED | OUTPATIENT
Start: 2019-06-18 | End: 2020-09-02 | Stop reason: SDUPTHER

## 2020-02-10 DIAGNOSIS — I10 BENIGN ESSENTIAL HYPERTENSION: ICD-10-CM

## 2020-02-11 RX ORDER — LISINOPRIL AND HYDROCHLOROTHIAZIDE 25; 20 MG/1; MG/1
1 TABLET ORAL DAILY
Qty: 30 TABLET | Refills: 11 | Status: SHIPPED | OUTPATIENT
Start: 2020-02-11 | End: 2020-05-13 | Stop reason: SDUPTHER

## 2020-05-13 DIAGNOSIS — I10 BENIGN ESSENTIAL HYPERTENSION: ICD-10-CM

## 2020-05-14 RX ORDER — LISINOPRIL AND HYDROCHLOROTHIAZIDE 25; 20 MG/1; MG/1
1 TABLET ORAL DAILY
Qty: 90 TABLET | Refills: 2 | Status: SHIPPED | OUTPATIENT
Start: 2020-05-14 | End: 2021-02-26

## 2020-09-02 DIAGNOSIS — I42.9 CARDIOMYOPATHY (HCC): ICD-10-CM

## 2020-09-02 RX ORDER — METOPROLOL SUCCINATE 25 MG/1
25 TABLET, EXTENDED RELEASE ORAL DAILY
Qty: 90 TABLET | Refills: 0 | Status: SHIPPED | OUTPATIENT
Start: 2020-09-02 | End: 2020-12-01

## 2020-09-16 DIAGNOSIS — I15.9 SECONDARY HYPERTENSION: ICD-10-CM

## 2020-09-16 RX ORDER — LISINOPRIL 20 MG/1
TABLET ORAL
Qty: 90 TABLET | Refills: 0 | Status: SHIPPED | OUTPATIENT
Start: 2020-09-16 | End: 2020-12-01 | Stop reason: SDUPTHER

## 2020-11-28 DIAGNOSIS — I42.9 CARDIOMYOPATHY (HCC): ICD-10-CM

## 2020-11-30 ENCOUNTER — VBI (OUTPATIENT)
Dept: ADMINISTRATIVE | Facility: OTHER | Age: 60
End: 2020-11-30

## 2020-12-01 ENCOUNTER — TELEPHONE (OUTPATIENT)
Dept: FAMILY MEDICINE CLINIC | Facility: MEDICAL CENTER | Age: 60
End: 2020-12-01

## 2020-12-01 DIAGNOSIS — I42.9 CARDIOMYOPATHY (HCC): ICD-10-CM

## 2020-12-01 DIAGNOSIS — I15.9 SECONDARY HYPERTENSION: ICD-10-CM

## 2020-12-01 RX ORDER — METOPROLOL SUCCINATE 25 MG/1
TABLET, EXTENDED RELEASE ORAL
Qty: 90 TABLET | Refills: 0 | Status: SHIPPED | OUTPATIENT
Start: 2020-12-01 | End: 2020-12-01 | Stop reason: SDUPTHER

## 2020-12-01 RX ORDER — METOPROLOL SUCCINATE 25 MG/1
25 TABLET, EXTENDED RELEASE ORAL DAILY
Qty: 90 TABLET | Refills: 3 | Status: SHIPPED | OUTPATIENT
Start: 2020-12-01 | End: 2021-11-30

## 2020-12-01 RX ORDER — LISINOPRIL 20 MG/1
20 TABLET ORAL DAILY
Qty: 90 TABLET | Refills: 3 | Status: SHIPPED | OUTPATIENT
Start: 2020-12-01 | End: 2021-12-22

## 2021-01-08 ENCOUNTER — VBI (OUTPATIENT)
Dept: ADMINISTRATIVE | Facility: OTHER | Age: 61
End: 2021-01-08

## 2021-02-26 DIAGNOSIS — I10 BENIGN ESSENTIAL HYPERTENSION: ICD-10-CM

## 2021-02-26 RX ORDER — LISINOPRIL AND HYDROCHLOROTHIAZIDE 25; 20 MG/1; MG/1
TABLET ORAL
Qty: 90 TABLET | Refills: 0 | Status: SHIPPED | OUTPATIENT
Start: 2021-02-26 | End: 2021-06-09

## 2021-03-10 DIAGNOSIS — Z23 ENCOUNTER FOR IMMUNIZATION: ICD-10-CM

## 2021-03-14 ENCOUNTER — IMMUNIZATIONS (OUTPATIENT)
Dept: FAMILY MEDICINE CLINIC | Facility: HOSPITAL | Age: 61
End: 2021-03-14

## 2021-03-14 DIAGNOSIS — Z23 ENCOUNTER FOR IMMUNIZATION: Primary | ICD-10-CM

## 2021-03-14 PROCEDURE — 0001A SARS-COV-2 / COVID-19 MRNA VACCINE (PFIZER-BIONTECH) 30 MCG: CPT

## 2021-03-14 PROCEDURE — 91300 SARS-COV-2 / COVID-19 MRNA VACCINE (PFIZER-BIONTECH) 30 MCG: CPT

## 2021-03-17 ENCOUNTER — OFFICE VISIT (OUTPATIENT)
Dept: CARDIOLOGY CLINIC | Facility: MEDICAL CENTER | Age: 61
End: 2021-03-17
Payer: COMMERCIAL

## 2021-03-17 VITALS
HEART RATE: 96 BPM | WEIGHT: 240.3 LBS | OXYGEN SATURATION: 97 % | HEIGHT: 68 IN | TEMPERATURE: 97.1 F | SYSTOLIC BLOOD PRESSURE: 132 MMHG | BODY MASS INDEX: 36.42 KG/M2 | DIASTOLIC BLOOD PRESSURE: 72 MMHG

## 2021-03-17 DIAGNOSIS — I10 BENIGN ESSENTIAL HYPERTENSION: ICD-10-CM

## 2021-03-17 DIAGNOSIS — E78.00 PURE HYPERCHOLESTEROLEMIA: ICD-10-CM

## 2021-03-17 DIAGNOSIS — E66.9 CLASS 2 OBESITY WITHOUT SERIOUS COMORBIDITY WITH BODY MASS INDEX (BMI) OF 35.0 TO 35.9 IN ADULT, UNSPECIFIED OBESITY TYPE: ICD-10-CM

## 2021-03-17 DIAGNOSIS — I48.91 ATRIAL FIBRILLATION, UNSPECIFIED TYPE (HCC): Primary | ICD-10-CM

## 2021-03-17 PROCEDURE — 93000 ELECTROCARDIOGRAM COMPLETE: CPT | Performed by: INTERNAL MEDICINE

## 2021-03-17 PROCEDURE — 3008F BODY MASS INDEX DOCD: CPT | Performed by: INTERNAL MEDICINE

## 2021-03-17 PROCEDURE — 99214 OFFICE O/P EST MOD 30 MIN: CPT | Performed by: INTERNAL MEDICINE

## 2021-03-17 PROCEDURE — 1036F TOBACCO NON-USER: CPT | Performed by: INTERNAL MEDICINE

## 2021-03-17 NOTE — PROGRESS NOTES
[facility]  Follow up note  Barrington Alvarez 56 y o  female MRN: [id number]        Problems    1  Atrial fibrillation, unspecified type ([address] )  POCT ECG   2  Benign essential hypertension  Basic metabolic panel   3  Pure hypercholesterolemia  Lipid panel   4  Class 2 obesity without serious comorbidity with body mass index (BMI) of 35 0 to 35 9 in adult, unspecified obesity type         Impression:    PAF  · Very remote episode in the setting of severe obesity  · There has been no recurrence  · She is slowly gaining weight again, I encouraged her to take all efforts to lose weight to prevent any recurrence of atrial fibrillation    Lipids  · Not checked in the last 2 years, cardiovascular risk previously remained low because of relatively young age and female gender    HTN  · Well controlled      Plan:    · Talked extensively today regarding weight loss  · I would recommend weighing herself once a week  · If she does not lose a half a lb to a lb in a week, she knows that she needs to be even more diligent the following week  · 30 minutes of exercise 3 times a week minimum  · Intermittent fasting recommended, 15 hours between dinner and lunch  · Encouraged her to drink water when she feels hungry during that period, but try to avoid eating  · Annual follow-up  · Given lab slip for routine labs    HPI:   Barrington Alvarez is a 56y o  year old female with a history of severe obesity status post bariatric surgery remotely, a remote history of PAF in the setting of obesity, without any recurrence, hypertension and hyperlipidemia  There has been no recurrence of atrial fibrillation  I am concerned about the weight loss, she is up to 240 lb post gastric bypass  Her blood pressure however remains controlled  She continues to snack  She working from home during [facility]  Her lipids previously were mildly elevated, but ASCVD risk score low therefore not committed to medical treatment      Review of Systems Constitutional: Negative for appetite change, diaphoresis, fatigue and fever  Respiratory: Negative for chest tightness, shortness of breath and wheezing  Cardiovascular: Negative for chest pain, palpitations and leg swelling  Gastrointestinal: Negative for abdominal pain and blood in stool  Musculoskeletal: Negative for arthralgias and joint swelling  Skin: Negative for rash  Neurological: Negative for dizziness, syncope and light-headedness  Past Medical History:   Diagnosis Date    Hypertension      Social History     Substance and Sexual Activity   Alcohol Use Not Currently     Social History     Substance and Sexual Activity   Drug Use Not Currently     Social History     Tobacco Use   Smoking Status Former Smoker    Types: Cigarettes   Smokeless Tobacco Never Used   Tobacco Comment    20 years ago         Allergies:  No Known Allergies    Medications:     Current Outpatient Medications:     calcium citrate (CALCITRATE) 950 MG tablet, Take 1 tablet by mouth daily, Disp: , Rfl:     cholecalciferol (VITAMIN D3) 1,000 units tablet, Take 1,000 Units by mouth daily, Disp: , Rfl:     cyanocobalamin 100 MCG tablet, Take 100 mcg by mouth every other day , Disp: , Rfl:     lisinopril (ZESTRIL) 20 mg tablet, Take 1 tablet (20 mg total) by mouth daily, Disp: 90 tablet, Rfl: 3    lisinopril-hydrochlorothiazide (PRINZIDE,ZESTORETIC) 20-25 MG per tablet, TAKE ONE TABLET BY MOUTH ONCE DAILY , Disp: 90 tablet, Rfl: 0    metoprolol succinate (TOPROL-XL) 25 mg 24 hr tablet, Take 1 tablet (25 mg total) by mouth daily, Disp: 90 tablet, Rfl: 3    multivitamin (THERAGRAN) TABS, Take 1 tablet by mouth daily, Disp: , Rfl:     Nutritional Supplements (QUINOA KALE & HEMP PO), Take by mouth, Disp: , Rfl:     triamcinolone (KENALOG) 0 1 % cream, Apply topically Twice daily, Disp: , Rfl:       Vitals:    03/17/21 1507   BP: 132/72   Pulse: 96   Temp: (!) 97 1 °F (36 2 °C)   SpO2: 97%     Weight (last 2 days)     Date/Time   Weight    03/17/21 1507   109 (240 3)            Physical Exam  Constitutional:       General: She is not in acute distress  Appearance: She is not diaphoretic  HENT:      Head: Normocephalic and atraumatic  Eyes:      General: No scleral icterus  Conjunctiva/sclera: Conjunctivae normal    Neck:      Musculoskeletal: Normal range of motion  Vascular: No JVD  Cardiovascular:      Rate and Rhythm: Normal rate and regular rhythm  Heart sounds: Normal heart sounds  No murmur  Pulmonary:      Effort: Pulmonary effort is normal  No respiratory distress  Breath sounds: Normal breath sounds  No wheezing, rhonchi or rales  Musculoskeletal:         General: No tenderness  Right lower leg: Normal  No edema  Left lower leg: Normal  No edema  Skin:     General: Skin is warm and dry  Laboratory Studies:  NT-proBNP: No results for input(s): NTBNP in the last 72 hours  Chemistries: No results for input(s): NA, K, CL, CO2, BUN in the last 72 hours  Invalid input(s): CREA, GLU  Lipid Profile:   Lab Results   Component Value Date    CHOL 149 08/01/2015    CHOL 142 04/25/2015    CHOL 188 09/13/2014     Lab Results   Component Value Date    HDL 58 03/09/2019    HDL 50 03/24/2016    HDL 59 08/01/2015     Lab Results   Component Value Date    LDLCALC 132 (H) 03/09/2019    LDLCALC 98 03/24/2016    LDLCALC 75 08/01/2015     Lab Results   Component Value Date    TRIG 119 03/09/2019    TRIG 89 03/24/2016    TRIG 73 08/01/2015     Lab Results   Component Value Date    CHOL 149 08/01/2015    HDL 58 03/09/2019    HDL 59 08/01/2015    TRIG 119 03/09/2019    TRIG 73 08/01/2015     No results for input(s): CHOL, LDLDIRECT, HDL, TRIG in the last 72 hours      Cardiac testing:     EKG reviewed personally:    2019-NSR, normal ECG  3/21-normal sinus rhythm, normal EKG      Petrona Ardon MD    Portions of the record may have been created with voice recognition software   Occasional wrong word or "sound a like" substitutions may have occurred due to the inherent limitations of voice recognition software   Read the chart carefully and recognize, using context, where substitutions have occurred

## 2021-03-17 NOTE — PATIENT INSTRUCTIONS
Today weight is 240 lb, it was 223 lb June of 2019 when he was last year to see me  In an effort to lose weight and maintain weight, I would recommend the following  Weigh yourself every Monday morning, if you do not lose a lb, you know that the last week was not successful, it means changes need to be made for the upcoming week  This is when focusing on actually losing weight  Intermittent fasting is very effective, do not eat anything after dinner, and do not eat anything until lunch the next day, about 15 hours in between these 2 meals is effective  DON'T FEED HUNGER!!   Try to get some increased activity, at least a 30 minutes walk 3 times a week or more if able

## 2021-03-30 ENCOUNTER — VBI (OUTPATIENT)
Dept: ADMINISTRATIVE | Facility: OTHER | Age: 61
End: 2021-03-30

## 2021-04-05 ENCOUNTER — IMMUNIZATIONS (OUTPATIENT)
Dept: FAMILY MEDICINE CLINIC | Facility: HOSPITAL | Age: 61
End: 2021-04-05

## 2021-04-05 DIAGNOSIS — Z23 ENCOUNTER FOR IMMUNIZATION: Primary | ICD-10-CM

## 2021-04-05 PROCEDURE — 0002A SARS-COV-2 / COVID-19 MRNA VACCINE (PFIZER-BIONTECH) 30 MCG: CPT

## 2021-04-05 PROCEDURE — 91300 SARS-COV-2 / COVID-19 MRNA VACCINE (PFIZER-BIONTECH) 30 MCG: CPT

## 2021-04-29 ENCOUNTER — VBI (OUTPATIENT)
Dept: ADMINISTRATIVE | Facility: OTHER | Age: 61
End: 2021-04-29

## 2021-06-09 DIAGNOSIS — I10 BENIGN ESSENTIAL HYPERTENSION: ICD-10-CM

## 2021-06-21 RX ORDER — LISINOPRIL AND HYDROCHLOROTHIAZIDE 25; 20 MG/1; MG/1
TABLET ORAL
Qty: 90 TABLET | Refills: 1 | Status: SHIPPED | OUTPATIENT
Start: 2021-06-21 | End: 2022-02-28

## 2021-09-22 ENCOUNTER — TELEPHONE (OUTPATIENT)
Dept: FAMILY MEDICINE CLINIC | Facility: MEDICAL CENTER | Age: 61
End: 2021-09-22

## 2021-11-30 DIAGNOSIS — I42.9 CARDIOMYOPATHY (HCC): ICD-10-CM

## 2021-11-30 RX ORDER — METOPROLOL SUCCINATE 25 MG/1
TABLET, EXTENDED RELEASE ORAL
Qty: 90 TABLET | Refills: 0 | Status: SHIPPED | OUTPATIENT
Start: 2021-11-30 | End: 2022-02-28

## 2021-12-20 DIAGNOSIS — I15.9 SECONDARY HYPERTENSION: ICD-10-CM

## 2021-12-22 RX ORDER — LISINOPRIL 20 MG/1
TABLET ORAL
Qty: 90 TABLET | Refills: 0 | Status: SHIPPED | OUTPATIENT
Start: 2021-12-22 | End: 2022-03-22

## 2022-01-17 NOTE — TELEPHONE ENCOUNTER
Pt not seen in two years can we set her up with an appt since Dr Juan Luis Limon is scheduled pretty far out for physicals  [facility]

## 2022-02-24 DIAGNOSIS — I42.9 CARDIOMYOPATHY (HCC): ICD-10-CM

## 2022-02-24 DIAGNOSIS — I10 BENIGN ESSENTIAL HYPERTENSION: ICD-10-CM

## 2022-03-01 RX ORDER — LISINOPRIL AND HYDROCHLOROTHIAZIDE 25; 20 MG/1; MG/1
TABLET ORAL
Qty: 90 TABLET | Refills: 2 | Status: SHIPPED | OUTPATIENT
Start: 2022-03-01

## 2022-03-01 RX ORDER — METOPROLOL SUCCINATE 25 MG/1
TABLET, EXTENDED RELEASE ORAL
Qty: 90 TABLET | Refills: 2 | Status: SHIPPED | OUTPATIENT
Start: 2022-03-01

## 2022-03-19 DIAGNOSIS — I15.9 SECONDARY HYPERTENSION: ICD-10-CM

## 2022-03-22 DIAGNOSIS — I15.9 SECONDARY HYPERTENSION: ICD-10-CM

## 2022-03-22 RX ORDER — LISINOPRIL 20 MG/1
20 TABLET ORAL DAILY
Qty: 90 TABLET | Refills: 0 | Status: SHIPPED | OUTPATIENT
Start: 2022-03-22

## 2022-03-22 RX ORDER — LISINOPRIL 20 MG/1
TABLET ORAL
Qty: 90 TABLET | Refills: 0 | Status: SHIPPED | OUTPATIENT
Start: 2022-03-22 | End: 2022-06-23 | Stop reason: SDUPTHER

## 2022-06-23 DIAGNOSIS — I15.9 SECONDARY HYPERTENSION: ICD-10-CM

## 2022-06-23 RX ORDER — LISINOPRIL 20 MG/1
20 TABLET ORAL DAILY
Qty: 90 TABLET | Refills: 3 | Status: SHIPPED | OUTPATIENT
Start: 2022-06-23

## 2023-02-23 DIAGNOSIS — I42.9 CARDIOMYOPATHY (HCC): ICD-10-CM

## 2023-02-23 DIAGNOSIS — I10 BENIGN ESSENTIAL HYPERTENSION: ICD-10-CM

## 2023-02-23 RX ORDER — LISINOPRIL AND HYDROCHLOROTHIAZIDE 25; 20 MG/1; MG/1
TABLET ORAL
Qty: 90 TABLET | Refills: 0 | Status: SHIPPED | OUTPATIENT
Start: 2023-02-23

## 2023-02-23 RX ORDER — METOPROLOL SUCCINATE 25 MG/1
TABLET, EXTENDED RELEASE ORAL
Qty: 90 TABLET | Refills: 0 | Status: SHIPPED | OUTPATIENT
Start: 2023-02-23

## 2023-04-15 ENCOUNTER — APPOINTMENT (EMERGENCY)
Dept: RADIOLOGY | Facility: HOSPITAL | Age: 63
End: 2023-04-15

## 2023-04-15 ENCOUNTER — HOSPITAL ENCOUNTER (OUTPATIENT)
Facility: HOSPITAL | Age: 63
Setting detail: OBSERVATION
Discharge: HOME | End: 2023-04-17
Attending: EMERGENCY MEDICINE | Admitting: INTERNAL MEDICINE

## 2023-04-15 DIAGNOSIS — E78.00 PURE HYPERCHOLESTEROLEMIA: ICD-10-CM

## 2023-04-15 DIAGNOSIS — R73.9 HYPERGLYCEMIA: Primary | ICD-10-CM

## 2023-04-15 DIAGNOSIS — R53.83 FATIGUE: ICD-10-CM

## 2023-04-15 DIAGNOSIS — E11.65 TYPE 2 DIABETES MELLITUS WITH HYPERGLYCEMIA, WITHOUT LONG-TERM CURRENT USE OF INSULIN (HCC): ICD-10-CM

## 2023-04-15 PROBLEM — E87.1 HYPONATREMIA: Status: ACTIVE | Noted: 2023-04-15

## 2023-04-15 PROBLEM — N17.9 AKI (ACUTE KIDNEY INJURY) (HCC): Status: ACTIVE | Noted: 2023-04-15

## 2023-04-15 LAB
ALBUMIN SERPL BCP-MCNC: 3.7 G/DL (ref 3.5–5)
ALP SERPL-CCNC: 103 U/L (ref 34–104)
ALT SERPL W P-5'-P-CCNC: 10 U/L (ref 7–52)
ANION GAP SERPL CALCULATED.3IONS-SCNC: 11 MMOL/L (ref 4–13)
APTT PPP: 28 SECONDS (ref 23–37)
AST SERPL W P-5'-P-CCNC: 9 U/L (ref 13–39)
BACTERIA UR QL AUTO: ABNORMAL /HPF
BACTERIA UR QL AUTO: NORMAL /HPF
BASE EX.OXY STD BLDV CALC-SCNC: 64 % (ref 60–80)
BASE EXCESS BLDV CALC-SCNC: 0 MMOL/L
BASOPHILS # BLD AUTO: 0.02 THOUSANDS/ΜL (ref 0–0.1)
BASOPHILS # BLD AUTO: 0.03 THOUSANDS/ΜL (ref 0–0.1)
BASOPHILS NFR BLD AUTO: 0 % (ref 0–1)
BASOPHILS NFR BLD AUTO: 0 % (ref 0–1)
BETA-HYDROXYBUTYRATE: 1.1 MMOL/L
BILIRUB SERPL-MCNC: 0.35 MG/DL (ref 0.2–1)
BILIRUB UR QL STRIP: NEGATIVE
BILIRUB UR QL STRIP: NEGATIVE
BNP SERPL-MCNC: 24 PG/ML (ref 0–100)
BUN SERPL-MCNC: 50 MG/DL (ref 5–25)
BUN SERPL-MCNC: 58 MG/DL (ref 5–25)
BUN SERPL-MCNC: 59 MG/DL (ref 5–25)
CALCIUM SERPL-MCNC: 9.1 MG/DL (ref 8.4–10.2)
CALCIUM SERPL-MCNC: 9.6 MG/DL (ref 8.4–10.2)
CALCIUM SERPL-MCNC: 9.7 MG/DL (ref 8.4–10.2)
CARDIAC TROPONIN I PNL SERPL HS: 3 NG/L
CHLORIDE SERPL-SCNC: 85 MMOL/L (ref 96–108)
CHLORIDE SERPL-SCNC: 86 MMOL/L (ref 96–108)
CHLORIDE SERPL-SCNC: 93 MMOL/L (ref 96–108)
CLARITY UR: CLEAR
CLARITY UR: CLEAR
CO2 SERPL-SCNC: 24 MMOL/L (ref 21–32)
CO2 SERPL-SCNC: 27 MMOL/L (ref 21–32)
CO2 SERPL-SCNC: 27 MMOL/L (ref 21–32)
COLOR UR: COLORLESS
COLOR UR: COLORLESS
CREAT SERPL-MCNC: 1.18 MG/DL (ref 0.6–1.3)
CREAT SERPL-MCNC: 1.48 MG/DL (ref 0.6–1.3)
CREAT SERPL-MCNC: 1.51 MG/DL (ref 0.6–1.3)
EOSINOPHIL # BLD AUTO: 0.04 THOUSAND/ΜL (ref 0–0.61)
EOSINOPHIL # BLD AUTO: 0.05 THOUSAND/ΜL (ref 0–0.61)
EOSINOPHIL NFR BLD AUTO: 1 % (ref 0–6)
EOSINOPHIL NFR BLD AUTO: 1 % (ref 0–6)
ERYTHROCYTE [DISTWIDTH] IN BLOOD BY AUTOMATED COUNT: 11.8 % (ref 11.6–15.1)
ERYTHROCYTE [DISTWIDTH] IN BLOOD BY AUTOMATED COUNT: 11.8 % (ref 11.6–15.1)
EST. AVERAGE GLUCOSE BLD GHB EST-MCNC: >355 MG/DL
GFR SERPL CREATININE-BSD FRML MDRD: 36 ML/MIN/1.73SQ M
GFR SERPL CREATININE-BSD FRML MDRD: 37 ML/MIN/1.73SQ M
GFR SERPL CREATININE-BSD FRML MDRD: 48 ML/MIN/1.73SQ M
GLUCOSE SERPL-MCNC: 295 MG/DL (ref 65–140)
GLUCOSE SERPL-MCNC: 322 MG/DL (ref 65–140)
GLUCOSE SERPL-MCNC: 340 MG/DL (ref 65–140)
GLUCOSE SERPL-MCNC: 357 MG/DL (ref 65–140)
GLUCOSE SERPL-MCNC: 453 MG/DL (ref 65–140)
GLUCOSE SERPL-MCNC: 457 MG/DL (ref 65–140)
GLUCOSE SERPL-MCNC: 494 MG/DL (ref 65–140)
GLUCOSE SERPL-MCNC: 667 MG/DL (ref 65–140)
GLUCOSE SERPL-MCNC: 721 MG/DL (ref 65–140)
GLUCOSE SERPL-MCNC: >500 MG/DL (ref 65–140)
GLUCOSE UR STRIP-MCNC: ABNORMAL MG/DL
GLUCOSE UR STRIP-MCNC: ABNORMAL MG/DL
HBA1C MFR BLD: >14 %
HCO3 BLDV-SCNC: 26.3 MMOL/L (ref 24–30)
HCT VFR BLD AUTO: 29.3 % (ref 34.8–46.1)
HCT VFR BLD AUTO: 32.9 % (ref 34.8–46.1)
HGB BLD-MCNC: 11 G/DL (ref 11.5–15.4)
HGB BLD-MCNC: 9.8 G/DL (ref 11.5–15.4)
HGB UR QL STRIP.AUTO: NEGATIVE
HGB UR QL STRIP.AUTO: NEGATIVE
IMM GRANULOCYTES # BLD AUTO: 0.03 THOUSAND/UL (ref 0–0.2)
IMM GRANULOCYTES # BLD AUTO: 0.03 THOUSAND/UL (ref 0–0.2)
IMM GRANULOCYTES NFR BLD AUTO: 0 % (ref 0–2)
IMM GRANULOCYTES NFR BLD AUTO: 0 % (ref 0–2)
INR PPP: 1.05 (ref 0.84–1.19)
KETONES UR STRIP-MCNC: ABNORMAL MG/DL
KETONES UR STRIP-MCNC: ABNORMAL MG/DL
LEUKOCYTE ESTERASE UR QL STRIP: ABNORMAL
LEUKOCYTE ESTERASE UR QL STRIP: ABNORMAL
LYMPHOCYTES # BLD AUTO: 1.48 THOUSANDS/ΜL (ref 0.6–4.47)
LYMPHOCYTES # BLD AUTO: 1.75 THOUSANDS/ΜL (ref 0.6–4.47)
LYMPHOCYTES NFR BLD AUTO: 21 % (ref 14–44)
LYMPHOCYTES NFR BLD AUTO: 24 % (ref 14–44)
MAGNESIUM SERPL-MCNC: 2.2 MG/DL (ref 1.9–2.7)
MCH RBC QN AUTO: 29.4 PG (ref 26.8–34.3)
MCH RBC QN AUTO: 29.8 PG (ref 26.8–34.3)
MCHC RBC AUTO-ENTMCNC: 33.4 G/DL (ref 31.4–37.4)
MCHC RBC AUTO-ENTMCNC: 33.4 G/DL (ref 31.4–37.4)
MCV RBC AUTO: 88 FL (ref 82–98)
MCV RBC AUTO: 89 FL (ref 82–98)
MONOCYTES # BLD AUTO: 0.64 THOUSAND/ΜL (ref 0.17–1.22)
MONOCYTES # BLD AUTO: 0.67 THOUSAND/ΜL (ref 0.17–1.22)
MONOCYTES NFR BLD AUTO: 9 % (ref 4–12)
MONOCYTES NFR BLD AUTO: 9 % (ref 4–12)
NEUTROPHILS # BLD AUTO: 4.76 THOUSANDS/ΜL (ref 1.85–7.62)
NEUTROPHILS # BLD AUTO: 5 THOUSANDS/ΜL (ref 1.85–7.62)
NEUTS SEG NFR BLD AUTO: 66 % (ref 43–75)
NEUTS SEG NFR BLD AUTO: 69 % (ref 43–75)
NITRITE UR QL STRIP: NEGATIVE
NITRITE UR QL STRIP: NEGATIVE
NON-SQ EPI CELLS URNS QL MICRO: ABNORMAL /HPF
NON-SQ EPI CELLS URNS QL MICRO: NORMAL /HPF
NRBC BLD AUTO-RTO: 0 /100 WBCS
NRBC BLD AUTO-RTO: 0 /100 WBCS
O2 CT BLDV-SCNC: 10.7 ML/DL
PCO2 BLDV: 50.1 MM HG (ref 42–50)
PH BLDV: 7.34 [PH] (ref 7.3–7.4)
PH UR STRIP.AUTO: 5 [PH]
PH UR STRIP.AUTO: 5 [PH]
PLATELET # BLD AUTO: 333 THOUSANDS/UL (ref 149–390)
PLATELET # BLD AUTO: 384 THOUSANDS/UL (ref 149–390)
PMV BLD AUTO: 10.2 FL (ref 8.9–12.7)
PMV BLD AUTO: 10.9 FL (ref 8.9–12.7)
PO2 BLDV: 34.4 MM HG (ref 35–45)
POTASSIUM SERPL-SCNC: 4.4 MMOL/L (ref 3.5–5.3)
POTASSIUM SERPL-SCNC: 4.4 MMOL/L (ref 3.5–5.3)
POTASSIUM SERPL-SCNC: 5 MMOL/L (ref 3.5–5.3)
PROT SERPL-MCNC: 7.6 G/DL (ref 6.4–8.4)
PROT UR STRIP-MCNC: NEGATIVE MG/DL
PROT UR STRIP-MCNC: NEGATIVE MG/DL
PROTHROMBIN TIME: 13.9 SECONDS (ref 11.6–14.5)
RBC # BLD AUTO: 3.33 MILLION/UL (ref 3.81–5.12)
RBC # BLD AUTO: 3.69 MILLION/UL (ref 3.81–5.12)
RBC #/AREA URNS AUTO: ABNORMAL /HPF
RBC #/AREA URNS AUTO: NORMAL /HPF
SODIUM SERPL-SCNC: 123 MMOL/L (ref 135–147)
SODIUM SERPL-SCNC: 124 MMOL/L (ref 135–147)
SODIUM SERPL-SCNC: 128 MMOL/L (ref 135–147)
SP GR UR STRIP.AUTO: 1.02 (ref 1–1.03)
SP GR UR STRIP.AUTO: 1.02 (ref 1–1.03)
TSH SERPL DL<=0.05 MIU/L-ACNC: 2.34 UIU/ML (ref 0.45–4.5)
UROBILINOGEN UR STRIP-ACNC: <2 MG/DL
UROBILINOGEN UR STRIP-ACNC: <2 MG/DL
WBC # BLD AUTO: 7.21 THOUSAND/UL (ref 4.31–10.16)
WBC # BLD AUTO: 7.29 THOUSAND/UL (ref 4.31–10.16)
WBC #/AREA URNS AUTO: ABNORMAL /HPF
WBC #/AREA URNS AUTO: NORMAL /HPF

## 2023-04-15 RX ORDER — INSULIN LISPRO 100 [IU]/ML
7 INJECTION, SOLUTION INTRAVENOUS; SUBCUTANEOUS
Status: DISCONTINUED | OUTPATIENT
Start: 2023-04-15 | End: 2023-04-16

## 2023-04-15 RX ORDER — MELATONIN
1000 DAILY
Status: DISCONTINUED | OUTPATIENT
Start: 2023-04-15 | End: 2023-04-17 | Stop reason: HOSPADM

## 2023-04-15 RX ORDER — LANOLIN ALCOHOL/MO/W.PET/CERES
3 CREAM (GRAM) TOPICAL
Status: DISCONTINUED | OUTPATIENT
Start: 2023-04-15 | End: 2023-04-17 | Stop reason: HOSPADM

## 2023-04-15 RX ORDER — SIMETHICONE 80 MG
80 TABLET,CHEWABLE ORAL 4 TIMES DAILY PRN
Status: DISCONTINUED | OUTPATIENT
Start: 2023-04-15 | End: 2023-04-17 | Stop reason: HOSPADM

## 2023-04-15 RX ORDER — AMOXICILLIN 250 MG
2 CAPSULE ORAL
Status: DISCONTINUED | OUTPATIENT
Start: 2023-04-15 | End: 2023-04-17 | Stop reason: HOSPADM

## 2023-04-15 RX ORDER — INSULIN GLARGINE 100 [IU]/ML
23 INJECTION, SOLUTION SUBCUTANEOUS EVERY MORNING
Status: DISCONTINUED | OUTPATIENT
Start: 2023-04-15 | End: 2023-04-16

## 2023-04-15 RX ORDER — INSULIN GLARGINE 100 [IU]/ML
23 INJECTION, SOLUTION SUBCUTANEOUS
Status: DISCONTINUED | OUTPATIENT
Start: 2023-04-15 | End: 2023-04-15

## 2023-04-15 RX ORDER — CALCIUM CARBONATE 500(1250)
1 TABLET ORAL 2 TIMES DAILY WITH MEALS
Status: DISCONTINUED | OUTPATIENT
Start: 2023-04-15 | End: 2023-04-17 | Stop reason: HOSPADM

## 2023-04-15 RX ORDER — METOPROLOL SUCCINATE 25 MG/1
25 TABLET, EXTENDED RELEASE ORAL DAILY
Status: DISCONTINUED | OUTPATIENT
Start: 2023-04-15 | End: 2023-04-17 | Stop reason: HOSPADM

## 2023-04-15 RX ORDER — INSULIN LISPRO 100 [IU]/ML
7 INJECTION, SOLUTION INTRAVENOUS; SUBCUTANEOUS ONCE
Status: COMPLETED | OUTPATIENT
Start: 2023-04-15 | End: 2023-04-15

## 2023-04-15 RX ORDER — SODIUM CHLORIDE 9 MG/ML
125 INJECTION, SOLUTION INTRAVENOUS CONTINUOUS
Status: DISPENSED | OUTPATIENT
Start: 2023-04-15 | End: 2023-04-16

## 2023-04-15 RX ORDER — INSULIN LISPRO 100 [IU]/ML
1-5 INJECTION, SOLUTION INTRAVENOUS; SUBCUTANEOUS
Status: DISCONTINUED | OUTPATIENT
Start: 2023-04-15 | End: 2023-04-17 | Stop reason: HOSPADM

## 2023-04-15 RX ORDER — INSULIN LISPRO 100 [IU]/ML
1-6 INJECTION, SOLUTION INTRAVENOUS; SUBCUTANEOUS
Status: DISCONTINUED | OUTPATIENT
Start: 2023-04-15 | End: 2023-04-17 | Stop reason: HOSPADM

## 2023-04-15 RX ORDER — ONDANSETRON 2 MG/ML
4 INJECTION INTRAMUSCULAR; INTRAVENOUS EVERY 4 HOURS PRN
Status: DISCONTINUED | OUTPATIENT
Start: 2023-04-15 | End: 2023-04-17 | Stop reason: HOSPADM

## 2023-04-15 RX ORDER — ACETAMINOPHEN 325 MG/1
650 TABLET ORAL EVERY 6 HOURS PRN
Status: DISCONTINUED | OUTPATIENT
Start: 2023-04-15 | End: 2023-04-17 | Stop reason: HOSPADM

## 2023-04-15 RX ADMIN — INSULIN HUMAN 5 UNITS: 100 INJECTION, SOLUTION PARENTERAL at 04:02

## 2023-04-15 RX ADMIN — B-COMPLEX W/ C & FOLIC ACID TAB 1 TABLET: TAB at 09:56

## 2023-04-15 RX ADMIN — SODIUM CHLORIDE 40 ML/HR: 0.9 INJECTION, SOLUTION INTRAVENOUS at 03:16

## 2023-04-15 RX ADMIN — SODIUM CHLORIDE 125 ML/HR: 0.9 INJECTION, SOLUTION INTRAVENOUS at 17:00

## 2023-04-15 RX ADMIN — INSULIN LISPRO 7 UNITS: 100 INJECTION, SOLUTION INTRAVENOUS; SUBCUTANEOUS at 07:27

## 2023-04-15 RX ADMIN — INSULIN GLARGINE 23 UNITS: 100 INJECTION, SOLUTION SUBCUTANEOUS at 10:01

## 2023-04-15 RX ADMIN — INSULIN LISPRO 6 UNITS: 100 INJECTION, SOLUTION INTRAVENOUS; SUBCUTANEOUS at 09:00

## 2023-04-15 RX ADMIN — SODIUM CHLORIDE 1000 ML: 0.9 INJECTION, SOLUTION INTRAVENOUS at 04:01

## 2023-04-15 RX ADMIN — INSULIN HUMAN 5 UNITS: 100 INJECTION, SOLUTION PARENTERAL at 04:00

## 2023-04-15 RX ADMIN — Medication 1000 UNITS: at 09:56

## 2023-04-15 RX ADMIN — INSULIN LISPRO 7 UNITS: 100 INJECTION, SOLUTION INTRAVENOUS; SUBCUTANEOUS at 12:56

## 2023-04-15 RX ADMIN — Medication 1 TABLET: at 16:54

## 2023-04-15 RX ADMIN — Medication 1 TABLET: at 09:59

## 2023-04-15 RX ADMIN — INSULIN LISPRO 3 UNITS: 100 INJECTION, SOLUTION INTRAVENOUS; SUBCUTANEOUS at 21:44

## 2023-04-15 RX ADMIN — METOPROLOL SUCCINATE 25 MG: 25 TABLET, EXTENDED RELEASE ORAL at 09:58

## 2023-04-15 RX ADMIN — Medication 100 MCG: at 09:57

## 2023-04-15 NOTE — ED NOTES
Dr Rufina Velazquez contacted via TT, pt reports she is not hungry d/t hx of gastric bypass  Reached out concerning holding insulin on sliding scale d/t not eating  Per Rufina Velazquez, follow the order       Rex Amaro RN  04/15/23 [id number]

## 2023-04-15 NOTE — ASSESSMENT & PLAN NOTE
· Creatinine 1 51, prior 0 75 (2019)  · Likely in setting of hyperglycemia, ACEI-HCTZ, poor PO intake   · IVF, correct hyperglycemia

## 2023-04-15 NOTE — H&P
[facility]  H&P  Name: Ella Shepard 57 y o  female I MRN: [id number]  Unit/Bed#: ED-06 I Date of Admission: 4/15/2023   Date of Service: 4/15/2023 I Hospital Day: 0      Assessment/Plan   * Type 2 diabetes mellitus with hyperglycemia, without long-term current use of insulin [facility])  Assessment & Plan  Lab Results   Component Value Date    HGBA1C 7 4 (H) 03/09/2019     · Presents with feeling weak  Notes soft blood pressures at home  Previously was on Metformin, but was stopped as blood sugars improved following weight loss  Unfortunately has lost PCP care as they have now retired  · Baseline labs show glucose of 721  Normal pH, no anion gap  Beta hydroxybutyrate 1 1  · S/p IVF bolus, 5 units regular insulin  Is to receive another 5 units IV insulin  · Likely needs insulin drip  · Continue IV fluids    ANA (acute kidney injury) ([facility] [address] )  Assessment & Plan  · Creatinine 1 51, prior 0 75 (2019)  · Likely in setting of hyperglycemia, ACEI-HCTZ, poor PO intake   · IVF, correct hyperglycemia    Hyponatremia  Assessment & Plan  · Sodium 123, corrects to 133  · Treat hyperglycemia  · IVF  · BMP in AM    Benign essential hypertension  Assessment & Plan  · Reports BP at home 107/65  Has been feeling weak  Called cardiologist who recommended stopping HCTZ  · PTA:   · Lisinopril-HCTZ 20-25 mg qd  · Lisinopril 20 mg qd  · Metoprolol succinate 25 mg qd  · Continue with metoprolol succinate only due to above       VTE Pharmacologic Prophylaxis: VTE Score: 2 Low Risk (Score 0-2) - Encourage Ambulation  Code Status: Level 1 - Full Code full  Discussion with family: Patient declined call to   Anticipated Length of Stay: Patient will be admitted on an observation basis with an anticipated length of stay of less than 2 midnights secondary to hyperglycemia      Total Time Spent on Date of Encounter in care of patient: 75 minutes This time was spent on one or more of the following: performing physical exam; counseling and coordination of care; obtaining or reviewing history; documenting in the medical record; reviewing/ordering tests, medications or procedures; communicating with other healthcare professionals and discussing with patient's family/caregivers  Chief Complaint: weakness, low bp     History of Present Illness:  Neo Linton is a 57 y o  female with a PMH of HTN, DM, gastric bypass,  who presents with generalized weakness, fatigue and low blood pressures over the past week  She initially thought her symptoms were due to GI bug she had on Mon/ Tues  She reports decreased PO intake but denies n/v/d/abdominal pain fever  The patient contacted her cardiology office who recommended she stop lisinopril-HCTZ  Baseline labs obtained in the ED reveal hyperglycemia with blood sugar of 721 and ANA  She was given 1 liter IVF and 5 units IV insulin  She is to  another 5 units of IV insulin  Will await response but will likely need insulin drip  Patient admits to diet high in sugar including candy, chips, fruit  The patient was previously on Metformin a few years ago but reports this was stopped as her diabetes resolved following bariatric surgery / weight loss  A1c from 2019 was 7 4  Per notes, patient was to have repeat within 3 months but then patient reports pandemic and PCP retiring interfered with this plan  She currently does not have a PCP  Review of Systems:  Review of Systems   Constitutional: Positive for fatigue  Negative for chills and fever  Respiratory: Negative  Cardiovascular: Negative  Gastrointestinal: Negative  Endocrine: Negative  Genitourinary: Negative  Neurological: Positive for weakness  All other systems reviewed and are negative        Past Medical and Surgical History:   Past Medical History:   Diagnosis Date   • Hypertension        Past Surgical History:   Procedure Laterality Date   • BARIATRIC SURGERY  2015   •  SECTION      32 years ago (2019)       Meds/Allergies:  Prior to Admission medications    Medication Sig Start Date End Date Taking? Authorizing Provider   calcium citrate (CALCITRATE) 950 MG tablet Take 1 tablet by mouth daily    Historical Provider, MD   cholecalciferol (VITAMIN D3) 1,000 units tablet Take 1,000 Units by mouth daily    Historical Provider, MD   cyanocobalamin 100 MCG tablet Take 100 mcg by mouth every other day     Historical Provider, MD   lisinopril (ZESTRIL) 20 mg tablet Take 1 tablet (20 mg total) by mouth daily 3/22/22   Bri Gaines MD   lisinopril (ZESTRIL) 20 mg tablet Take 1 tablet (20 mg total) by mouth daily 22   Bri Gaines MD   lisinopril-hydrochlorothiazide (PRINZIDE,ZESTORETIC) 20-25 MG per tablet TAKE ONE TABLET BY MOUTH ONCE DAILY 23   Bri Gaines MD   metoprolol succinate (TOPROL-XL) 25 mg 24 hr tablet TAKE ONE TABLET BY MOUTH ONCE DAILY 23   Bri Gaines MD   multivitamin [facility]) TABS Take 1 tablet by mouth daily    Historical Provider, MD   Nutritional Supplements (Parag Gala [facility]EM[facility] PO) Take by mouth    Historical Provider, MD   triamcinolone (KENALOG) 0 1 % cream Apply topically Twice daily 17   Historical Provider, MD     I have reviewed home medications with patient personally  Allergies: No Known Allergies    Social History:  Marital Status: /Civil Union   Occupation:   Patient Pre-hospital Living Situation: Home  Patient Pre-hospital Level of Mobility: walks  Patient Pre-hospital Diet Restrictions:   Substance Use History:   Social History     Substance and Sexual Activity   Alcohol Use Not Currently     Social History     Tobacco Use   Smoking Status Former   • Types: Cigarettes   Smokeless Tobacco Never   Tobacco Comments    20 years ago       Social History     Substance and Sexual Activity   Drug Use Not Currently       Family History:  Family History   Problem Relation Age of Onset   • Arrhythmia Mother         atrial flutter   • Skin cancer Mother    • Hypertension Father    • Hyperlipidemia Father    • Atrial fibrillation Sister    • Atrial fibrillation Brother    • Other Brother         sarosis of liver   • Heart attack Paternal Uncle    • Breast cancer Maternal Grandmother    • Throat cancer Maternal Grandfather    • Stroke Neg Hx    • Anuerysm Neg Hx    • Clotting disorder Neg Hx    • Heart failure Neg Hx    • Coronary artery disease Neg Hx        Physical Exam:     Vitals:   Blood Pressure: 121/75 (04/15/23 0400)  Pulse: 96 (04/15/23 0400)  Temperature: 98 3 °F (36 8 °C) (04/15/23 0102)  Temp Source: Oral (04/15/23 0102)  Respirations: 16 (04/15/23 0400)  SpO2: 97 % (04/15/23 0400)    Physical Exam  Constitutional:       General: She is not in acute distress  Appearance: Normal appearance  She is not ill-appearing  HENT:      Head: Normocephalic and atraumatic  Right Ear: External ear normal       Left Ear: External ear normal       Nose: Nose normal       Mouth/Throat:      Pharynx: Oropharynx is clear  Eyes:      General: No scleral icterus  Extraocular Movements: Extraocular movements intact  Conjunctiva/sclera: Conjunctivae normal    Cardiovascular:      Rate and Rhythm: Regular rhythm  Tachycardia present  Pulses: Normal pulses  Heart sounds: Normal heart sounds  Pulmonary:      Effort: Pulmonary effort is normal       Breath sounds: Normal breath sounds  Abdominal:      General: Bowel sounds are normal  There is no distension  Palpations: Abdomen is soft  Tenderness: There is no abdominal tenderness  There is no right CVA tenderness, left CVA tenderness, guarding or rebound  Musculoskeletal:         General: Normal range of motion  Cervical back: Normal range of motion  No rigidity or tenderness  Right lower leg: No edema  Left lower leg: No edema  Skin:     General: Skin is warm and dry  Capillary Refill: Capillary refill takes less than 2 seconds  Neurological:      General: No focal deficit present  Mental Status: She is alert and oriented to person, place, and time  Psychiatric:         Mood and Affect: Mood normal          Behavior: Behavior normal           Additional Data:     Lab Results:  Results from last 7 days   Lab Units 04/15/23  0149   WBC Thousand/uL 7 29   HEMOGLOBIN g/dL 11 0*   HEMATOCRIT % 32 9*   PLATELETS Thousands/uL 384   NEUTROS PCT % 66   LYMPHS PCT % 24   MONOS PCT % 9   EOS PCT % 1     Results from last 7 days   Lab Units 04/15/23  0313 04/15/23  0149   SODIUM mmol/L 124* 123*   POTASSIUM mmol/L 4 4 5 0   CHLORIDE mmol/L 86* 85*   CO2 mmol/L 27 27   BUN mg/dL 58* 59*   CREATININE mg/dL 1 48* 1 51*   ANION GAP mmol/L 11 11   CALCIUM mg/dL 9 7 9 6   ALBUMIN g/dL  --  3 7   TOTAL BILIRUBIN mg/dL  --  0 35   ALK PHOS U/L  --  103   ALT U/L  --  10   AST U/L  --  9*   GLUCOSE RANDOM mg/dL 667* 721*     Results from last 7 days   Lab Units 04/15/23  0149   INR  1 05     Results from last 7 days   Lab Units 04/15/23  0313   POC GLUCOSE mg/dl >500*               Lines/Drains:  Invasive Devices     Peripheral Intravenous Line  Duration           Peripheral IV 04/15/23 Left Antecubital <1 day                    Imaging: Reviewed radiology reports from this admission including: chest xray  XR chest 1 view portable   ED Interpretation by Sheryl Huffman PA-C (04/15 9192)   No acute cardiopulmonary disease on initial read          EKG and Other Studies Reviewed on Admission:   · EKG: NSR  HR 96     ** Please Note: This note has been constructed using a voice recognition system   **

## 2023-04-15 NOTE — ED NOTES
Tigertexted admitting provider regarding pt's glucose level and insulin administration  Pt has lunch tray at bedside but does not want to eat d/t feeling nauseous  Asked provider for for medications at this time        Betzy Cruz RN  04/15/23 6330

## 2023-04-15 NOTE — ASSESSMENT & PLAN NOTE
· Reports BP at home 107/65  Has been feeling weak    Called cardiologist who recommended stopping HCTZ  · PTA:   · Lisinopril-HCTZ 20-25 mg qd  · Lisinopril 20 mg qd  · Metoprolol succinate 25 mg qd  · Continue with metoprolol succinate only due to above

## 2023-04-15 NOTE — PLAN OF CARE
Problem: Potential for Falls  Goal: Patient will remain free of falls  Description: INTERVENTIONS:  - Educate patient/family on patient safety including physical limitations  - Instruct patient to call for assistance with activity   - Consult OT/PT to assist with strengthening/mobility   - Keep Call bell within reach  - Keep bed low and locked with side rails adjusted as appropriate  - Keep care items and personal belongings within reach  - Initiate and maintain comfort rounds  - Make Fall Risk Sign visible to staff  - Obtain necessary fall risk management equipment  - Apply yellow socks and bracelet for high fall risk patients  - Consider moving patient to room near nurses station  Outcome: Progressing     Problem: PAIN - ADULT  Goal: Verbalizes/displays adequate comfort level or baseline comfort level  Description: Interventions:  - Encourage patient to monitor pain and request assistance  - Assess pain using appropriate pain scale  - Administer analgesics based on type and severity of pain and evaluate response  - Implement non-pharmacological measures as appropriate and evaluate response  - Consider cultural and social influences on pain and pain management  - Notify physician/advanced practitioner if interventions unsuccessful or patient reports new pain  Outcome: Progressing     Problem: INFECTION - ADULT  Goal: Absence or prevention of progression during hospitalization  Description: INTERVENTIONS:  - Assess and monitor for signs and symptoms of infection  - Monitor lab/diagnostic results  - Monitor all insertion sites, i e  indwelling lines, tubes, and drains  - Monitor endotracheal if appropriate and nasal secretions for changes in amount and color  - [place] appropriate cooling/warming therapies per order  - Administer medications as ordered  - Instruct and encourage patient and family to use good hand hygiene technique  - Identify and instruct in appropriate isolation precautions for identified infection/condition  Outcome: Progressing     Problem: SAFETY ADULT  Goal: Patient will remain free of falls  Description: INTERVENTIONS:  - Educate patient/family on patient safety including physical limitations  - Instruct patient to call for assistance with activity   - Consult OT/PT to assist with strengthening/mobility   - Keep Call bell within reach  - Keep bed low and locked with side rails adjusted as appropriate  - Keep care items and personal belongings within reach  - Initiate and maintain comfort rounds  - Make Fall Risk Sign visible to staff  - Obtain necessary fall risk management equipment  - Apply yellow socks and bracelet for high fall risk patients  - Consider moving patient to room near nurses station  Outcome: Progressing     Problem: DISCHARGE PLANNING  Goal: Discharge to home or other facility with appropriate resources  Description: INTERVENTIONS:  - Identify barriers to discharge w/patient and caregiver  - Arrange for needed discharge resources and transportation as appropriate  - Identify discharge learning needs (meds, wound care, etc )  - Arrange for interpretive services to assist at discharge as needed  - Refer to Case Management Department for coordinating discharge planning if the patient needs post-hospital services based on physician/advanced practitioner order or complex needs related to functional status, cognitive ability, or social support system  Outcome: Progressing     Problem: Knowledge Deficit  Goal: Patient/family/caregiver demonstrates understanding of disease process, treatment plan, medications, and discharge instructions  Description: Complete learning assessment and assess knowledge base    Interventions:  - Provide teaching at level of understanding  - Provide teaching via preferred learning methods  Outcome: Progressing     Problem: METABOLIC, FLUID AND ELECTROLYTES - ADULT  Goal: Electrolytes maintained within normal limits  Description: INTERVENTIONS:  - Monitor labs and assess patient for signs and symptoms of electrolyte imbalances  - Administer electrolyte replacement as ordered  - Monitor response to electrolyte replacements, including repeat lab results as appropriate  - Instruct patient on fluid and nutrition as appropriate  Outcome: Progressing  Goal: Fluid balance maintained  Description: INTERVENTIONS:  - Monitor labs   - Monitor I/O and WT  - Instruct patient on fluid and nutrition as appropriate  - Assess for signs & symptoms of volume excess or deficit  Outcome: Progressing  Goal: Glucose maintained within target range  Description: INTERVENTIONS:  - Monitor Blood Glucose as ordered  - Assess for signs and symptoms of hyperglycemia and hypoglycemia  - Administer ordered medications to maintain glucose within target range  - Assess nutritional intake and initiate nutrition service referral as needed  Outcome: Progressing

## 2023-04-15 NOTE — ED PROVIDER NOTES
History  Chief Complaint   Patient presents with   • Blood Pressure Check     Pt reports having low blood over last couple of days, states on 3 different BP meds and has recently lost over 30lbs  Hx of weight loss surgery  Patient is a 76-year-old female with a history of hypertension currently on lisinopril 20 mg, chlorothiazide 25 mg, metoprolol 25 mg all daily presents emerged department with fatigue for 1 week  Patient states that she had bariatric weight loss surgery approximately 6 to 7 years ago [facility] [address] [facility] [facility] and also reports a bout of atrial fibrillation prior to bariatric surgery  Patient states that she follows up with cardiology regularly as an outpatient  Patient denies recurrent episode of atrial fibrillation from initial episode possibly 6 to 7 years prior to current ED presentation  Patient states that she stopped her hydrochlorothiazide medication, hydrochlorothiazide 25 mg today after feeling weak and take her blood pressure throughout the day systolic pressures between 100-110's  Patient patient denies associated symptoms  Patient also endorses some nausea symptoms occurring intermittently throughout the past couple weeks unknown sick contacts  Patient states that she called her cardiologist office and informed them that she would be stopping her hydrochlorothiazide 25 mg tablet today, patient denies speaking with provider (spoke with medical assistant )  Patient denies palliative and provocative factors  Patient has noneffective treatment  Patient has fevers, chills, nausea, vomiting, diarrhea, constipation, urine symptoms  Patient has recent fall recent trauma  Patient denies sick contacts recent travel  Patient denies AC/AP  Patient denies chest pain, shortness of breath, and abdominal pain  History provided by:  Patient   used: No        Prior to Admission Medications   Prescriptions Last Dose Informant Patient Reported? Taking? Nutritional Supplements (QUINOA KALE & HEMP PO)   Yes No   Sig: Take by mouth   calcium citrate (CALCITRATE) 950 MG tablet   Yes No   Sig: Take 1 tablet by mouth daily   cholecalciferol (VITAMIN D3) 1,000 units tablet   Yes No   Sig: Take 1,000 Units by mouth daily   cyanocobalamin 100 MCG tablet   Yes No   Sig: Take 100 mcg by mouth every other day    lisinopril (ZESTRIL) 20 mg tablet   No No   Sig: Take 1 tablet (20 mg total) by mouth daily   lisinopril (ZESTRIL) 20 mg tablet   No No   Sig: Take 1 tablet (20 mg total) by mouth daily   lisinopril-hydrochlorothiazide (PRINZIDE,ZESTORETIC) 20-25 MG per tablet   No No   Sig: TAKE ONE TABLET BY MOUTH ONCE DAILY   metoprolol succinate (TOPROL-XL) 25 mg 24 hr tablet   No No   Sig: TAKE ONE TABLET BY MOUTH ONCE DAILY   multivitamin (THERAGRAN) TABS   Yes No   Sig: Take 1 tablet by mouth daily   triamcinolone (KENALOG) 0 1 % cream   Yes No   Sig: Apply topically Twice daily      Facility-Administered Medications: None       Past Medical History:   Diagnosis Date   • Hypertension        Past Surgical History:   Procedure Laterality Date   • BARIATRIC SURGERY  2015   •  SECTION      32 years ago (2019)       Family History   Problem Relation Age of Onset   • Arrhythmia Mother         atrial flutter   • Skin cancer Mother    • Hypertension Father    • Hyperlipidemia Father    • Atrial fibrillation Sister    • Atrial fibrillation Brother    • Other Brother         sarosis of liver   • Heart attack Paternal Uncle    • Breast cancer Maternal Grandmother    • Throat cancer Maternal Grandfather    • Stroke Neg Hx    • Anuerysm Neg Hx    • Clotting disorder Neg Hx    • Heart failure Neg Hx    • Coronary artery disease Neg Hx      I have reviewed and agree with the history as documented      E-Cigarette/Vaping   • E-Cigarette Use Never User      E-Cigarette/Vaping Substances   • Nicotine No    • THC No    • CBD No    • Flavoring No    • Other No    • Unknown No      Social History     Tobacco Use   • Smoking status: Former     Types: Cigarettes   • Smokeless tobacco: Never   • Tobacco comments:     20 years ago  Vaping Use   • Vaping Use: Never used   Substance Use Topics   • Alcohol use: Not Currently   • Drug use: Not Currently       Review of Systems   Constitutional: Positive for fatigue  Negative for activity change, appetite change, chills and fever  HENT: Negative for congestion, postnasal drip, rhinorrhea, sinus pressure, sinus pain, sore throat and tinnitus  Eyes: Negative for photophobia and visual disturbance  Respiratory: Negative for cough, chest tightness and shortness of breath  Cardiovascular: Negative for chest pain and palpitations  Gastrointestinal: Negative for abdominal pain, constipation, diarrhea, nausea and vomiting  Genitourinary: Negative for difficulty urinating, dysuria, flank pain, frequency and urgency  Musculoskeletal: Negative for back pain, gait problem, neck pain and neck stiffness  Skin: Negative for pallor and rash  Allergic/Immunologic: Negative for environmental allergies and food allergies  Neurological: Negative for dizziness, weakness, numbness and headaches  Psychiatric/Behavioral: Negative for confusion  All other systems reviewed and are negative  Physical Exam  Physical Exam  Vitals and nursing note reviewed  Constitutional:       General: She is awake  Appearance: Normal appearance  She is well-developed  She is not ill-appearing, toxic-appearing or diaphoretic  Comments: /77   Pulse 104   Temp 98 3 °F (36 8 °C) (Oral)   Resp 18   SpO2 98%      HENT:      Head: Normocephalic and atraumatic  Right Ear: Hearing and external ear normal  No decreased hearing noted  No drainage, swelling or tenderness  No mastoid tenderness  Left Ear: Hearing and external ear normal  No decreased hearing noted  No drainage, swelling or tenderness  No mastoid tenderness        Nose: Nose normal  Mouth/Throat:      Lips: Pink  Mouth: Mucous membranes are moist       Pharynx: Oropharynx is clear  Uvula midline  Eyes:      General: Lids are normal  Vision grossly intact  Right eye: No discharge  Left eye: No discharge  Extraocular Movements: Extraocular movements intact  Conjunctiva/sclera: Conjunctivae normal       Pupils: Pupils are equal, round, and reactive to light  Neck:      Vascular: No JVD  Trachea: Trachea and phonation normal  No tracheal tenderness or tracheal deviation  Cardiovascular:      Rate and Rhythm: Normal rate and regular rhythm  Pulses: Normal pulses  Radial pulses are 2+ on the right side and 2+ on the left side  Posterior tibial pulses are 2+ on the right side and 2+ on the left side  Heart sounds: Normal heart sounds  Pulmonary:      Effort: Pulmonary effort is normal       Breath sounds: Normal breath sounds  No stridor  No decreased breath sounds, wheezing, rhonchi or rales  Chest:      Chest wall: No tenderness  Abdominal:      General: Abdomen is flat  Bowel sounds are normal  There is no distension  Palpations: Abdomen is soft  Abdomen is not rigid  Tenderness: There is no abdominal tenderness  There is no guarding or rebound  Musculoskeletal:         General: Normal range of motion  Cervical back: Full passive range of motion without pain, normal range of motion and neck supple  No rigidity  No spinous process tenderness or muscular tenderness  Normal range of motion  Lymphadenopathy:      Head:      Right side of head: No submental, submandibular, tonsillar, preauricular, posterior auricular or occipital adenopathy  Left side of head: No submental, submandibular, tonsillar, preauricular, posterior auricular or occipital adenopathy  Cervical: No cervical adenopathy  Right cervical: No superficial, deep or posterior cervical adenopathy       Left cervical: No superficial, deep or posterior cervical adenopathy  Skin:     General: Skin is warm  Capillary Refill: Capillary refill takes less than 2 seconds  Neurological:      General: No focal deficit present  Mental Status: She is alert and oriented to person, place, and time  GCS: GCS eye subscore is 4  GCS verbal subscore is 5  GCS motor subscore is 6  Cranial Nerves: Cranial nerves 2-12 are intact  Sensory: Sensation is intact  No sensory deficit  Motor: Motor function is intact  Coordination: Coordination is intact  Gait: Gait is intact  Deep Tendon Reflexes: Reflexes are normal and symmetric  Reflex Scores:       Patellar reflexes are 2+ on the right side and 2+ on the left side  Psychiatric:         Mood and Affect: Mood normal          Speech: Speech normal          Behavior: Behavior normal  Behavior is cooperative  Thought Content:  Thought content normal          Judgment: Judgment normal          Vital Signs  ED Triage Vitals   Temperature Pulse Respirations Blood Pressure SpO2   04/15/23 0102 04/15/23 0056 04/15/23 0056 04/15/23 0056 04/15/23 0056   98 3 °F (36 8 °C) 104 18 118/77 98 %      Temp Source Heart Rate Source Patient Position - Orthostatic VS BP Location FiO2 (%)   04/15/23 0102 04/15/23 0056 04/15/23 0157 04/15/23 0157 --   Oral Monitor Lying Right arm       Pain Score       --                  Vitals:    04/15/23 0157 04/15/23 0200 04/15/23 0300 04/15/23 0400   BP: 119/76 106/72 133/70 121/75   Pulse: 97 96 101 96   Patient Position - Orthostatic VS: Lying Lying Lying Lying         Visual Acuity      ED Medications  Medications   sodium chloride 0 9 % infusion (125 mL/hr Intravenous Rate/Dose Change 4/15/23 0402)   sodium chloride 0 9 % bolus 1,000 mL (1,000 mL Intravenous New Bag 4/15/23 0401)   insulin regular (HumuLIN R,NovoLIN R) injection 5 Units (has no administration in time range)   insulin regular (HumuLIN R,NovoLIN R) injection 5 Units (5 Units Intravenous Given 4/15/23 0400)       Diagnostic Studies  Results Reviewed     Procedure Component Value Units Date/Time    Basic metabolic panel [[id number]]  (Abnormal) Collected: 04/15/23 0313    Lab Status: Final result Specimen: Blood from Arm, Right Updated: 04/15/23 0405     Sodium 124 mmol/L      Potassium 4 4 mmol/L      Chloride 86 mmol/L      CO2 27 mmol/L      ANION GAP 11 mmol/L      BUN 58 mg/dL      Creatinine 1 48 mg/dL      Glucose 667 mg/dL      Calcium 9 7 mg/dL      eGFR 37 ml/min/1 73sq m     Narrative:      [facility] guidelines for Chronic Kidney Disease (CKD):   •  Stage 1 with normal or high GFR (GFR > 90 mL/min/1 73 square meters)  •  Stage 2 Mild CKD (GFR = 60-89 mL/min/1 73 square meters)  •  Stage 3A Moderate CKD (GFR = 45-59 mL/min/1 73 square meters)  •  Stage 3B Moderate CKD (GFR = 30-44 mL/min/1 73 square meters)  •  Stage 4 Severe CKD (GFR = 15-29 mL/min/1 73 square meters)  •  Stage 5 End Stage CKD (GFR <15 mL/min/1 73 square meters)  Note: GFR calculation is accurate only with a steady state creatinine    Beta Hydroxybutyrate [[id number]]  (Abnormal) Collected: 04/15/23 0313    Lab Status: Final result Specimen: Blood from Arm, Right Updated: 04/15/23 0359     BETA-HYDROXYBUTYRATE 1 1 mmol/L     Hemoglobin A1C [[id number]] Collected: 04/15/23 0149    Lab Status:  In process Specimen: Blood from Arm, Left Updated: 04/15/23 0355    Blood gas, venous [[id number]]  (Abnormal) Collected: 04/15/23 0313    Lab Status: Final result Specimen: Blood from Arm, Right Updated: 04/15/23 0339     pH, Gordo 7 338     pCO2, Gordo 50 1 mm Hg      pO2, Gordo 34 4 mm Hg      HCO3, Gordo 26 3 mmol/L      Base Excess, Gordo 0 0 mmol/L      O2 Content, Gordo 10 7 ml/dL      O2 HGB, VENOUS 64 0 %     Fingerstick Glucose (POCT) [[id number]]  (Abnormal) Collected: 04/15/23 0313    Lab Status: Final result Updated: 04/15/23 0315     POC Glucose >500 mg/dl     UA w Reflex to Microscopic w Reflex to Culture [[id number]]     Lab Status: No result Specimen: Urine, Clean Catch     B-Type Natriuretic Peptide(BNP) [[id number]]  (Normal) Collected: 04/15/23 0149    Lab Status: Final result Specimen: Blood from Arm, Left Updated: 04/15/23 0301     BNP 24 pg/mL     Comprehensive metabolic panel [[id number]]  (Abnormal) Collected: 04/15/23 0149    Lab Status: Final result Specimen: Blood from Arm, Left Updated: 04/15/23 0255     Sodium 123 mmol/L      Potassium 5 0 mmol/L      Chloride 85 mmol/L      CO2 27 mmol/L      ANION GAP 11 mmol/L      BUN 59 mg/dL      Creatinine 1 51 mg/dL      Glucose 721 mg/dL      Calcium 9 6 mg/dL      AST 9 U/L      ALT 10 U/L      Alkaline Phosphatase 103 U/L      Total Protein 7 6 g/dL      Albumin 3 7 g/dL      Total Bilirubin 0 35 mg/dL      eGFR 36 ml/min/1 73sq m     Narrative:      [place] guidelines for Chronic Kidney Disease (CKD):   •  Stage 1 with normal or high GFR (GFR > 90 mL/min/1 73 square meters)  •  Stage 2 Mild CKD (GFR = 60-89 mL/min/1 73 square meters)  •  Stage 3A Moderate CKD (GFR = 45-59 mL/min/1 73 square meters)  •  Stage 3B Moderate CKD (GFR = 30-44 mL/min/1 73 square meters)  •  Stage 4 Severe CKD (GFR = 15-29 mL/min/1 73 square meters)  •  Stage 5 End Stage CKD (GFR <15 mL/min/1 73 square meters)  Note: GFR calculation is accurate only with a steady state creatinine    Magnesium [[id number]]  (Normal) Collected: 04/15/23 0149    Lab Status: Final result Specimen: Blood from Arm, Left Updated: 04/15/23 0245     Magnesium 2 2 mg/dL     TSH, 3rd generation with Free T4 reflex [[id number]]  (Normal) Collected: 04/15/23 0149    Lab Status: Final result Specimen: Blood from Arm, Left Updated: 04/15/23 0245     TSH 3RD GENERATON 2 337 uIU/mL     HS Troponin 0hr (reflex protocol) [[id number]]  (Normal) Collected: 04/15/23 0149    Lab Status: Final result Specimen: Blood from Arm, Left Updated: 04/15/23 0237     hs TnI 0hr 3 ng/L     Protime-INR [[id number]]  (Normal) Collected: 04/15/23 0149    Lab Status: Final result Specimen: Blood from Arm, Left Updated: 04/15/23 0228     Protime 13 9 seconds      INR 1 05    APTT [[id number]]  (Normal) Collected: 04/15/23 0149    Lab Status: Final result Specimen: Blood from Arm, Left Updated: 04/15/23 0228     PTT 28 seconds     CBC and differential [[id number]]  (Abnormal) Collected: 04/15/23 0149    Lab Status: Final result Specimen: Blood from Arm, Left Updated: 04/15/23 0207     WBC 7 29 Thousand/uL      RBC 3 69 Million/uL      Hemoglobin 11 0 g/dL      Hematocrit 32 9 %      MCV 89 fL      MCH 29 8 pg      MCHC 33 4 g/dL      RDW 11 8 %      MPV 10 9 fL      Platelets 193 Thousands/uL      nRBC 0 /100 WBCs      Neutrophils Relative 66 %      Immat GRANS % 0 %      Lymphocytes Relative 24 %      Monocytes Relative 9 %      Eosinophils Relative 1 %      Basophils Relative 0 %      Neutrophils Absolute 4 76 Thousands/µL      Immature Grans Absolute 0 03 Thousand/uL      Lymphocytes Absolute 1 75 Thousands/µL      Monocytes Absolute 0 67 Thousand/µL      Eosinophils Absolute 0 05 Thousand/µL      Basophils Absolute 0 03 Thousands/µL                  XR chest 1 view portable   ED Interpretation by Jeanmarie Cho PA-C (04/15 7350)   No acute cardiopulmonary disease on initial read                 Procedures  ECG 12 Lead Documentation Only    Date/Time: 4/15/2023 1:55 AM  Performed by: Jeanmarie Cho PA-C  Authorized by: Jeanmarie Cho PA-C     Indications / Diagnosis:  Fatigue  ECG reviewed by me, the ED Provider: yes    Patient location:  ED  Previous ECG:     Previous ECG:  Compared to current    Comparison ECG info:  Compared with ECG on February 4, 2013, no significant changes were noted      Similarity:  No change    Comparison to cardiac monitor: Yes    Interpretation:     Interpretation: normal    Rate:     ECG rate:  96    ECG rate assessment: normal    Rhythm:     Rhythm: sinus rhythm    Ectopy:     Ectopy: none "  QRS:     QRS axis:  Normal    QRS intervals:  Normal  Conduction:     Conduction: normal    ST segments:     ST segments:  Normal  T waves:     T waves: normal               ED Course  ED Course as of 04/15/23 0431   Sat Apr 15, 2023   0307 Corrected sodium 133   0327 Creatinine(!): 1 51   0327 BUN(!): 59   0350 Patient has no focal neurological deficits; ACS 15, corrected sodium 133 in the face of hyperglycemia 721  Patient states that she had a history of diabetes mellitus with self-report of correction status post Oestreich bypass, but patient reports since her surgery, \"I have continued to eat sweets  \"   0353 pH, Gordo: 7 338   9119 Anion Gap: 11                                             Medical Decision Making  Patient is a 55-year-old female with a history of hypertension currently on lisinopril 20 mg, chlorothiazide 25 mg, metoprolol 25 mg all daily presents emerged department with fatigue for 1 week  Patient states that she had bariatric weight loss surgery approximately 6 to 7 years ago at [address] [facility] and also reports a bout of atrial fibrillation prior to bariatric surgery  Patient normotensive, dynamically stable  GCS 15, alert and oriented x3, no neurofocal deficits    Initial serum glucose 721 with serum creatinine 1 51 and BUN 59; corrected sodium 133  ECG with normal sinus rhythm, no ischemic changes, normal potassium 5 0  5 units of normal insulin, and 1 L bolus of normal saline solution delivered with subsequent serum glucose 667 with downward trend of serum creatinine of 1 48, BUN 58 status post 1 L bolus of normal saline solution delivery repeat potassium 4 4, no anion gap  Second dose of 5 units of normal insulin, and will saline solution infusion 125mL/hour  Patient not acidotic; updated beta hydroxybutyrate 1 1  HHS versus DKA  Patient continues to make urine, doubt urological symptomatology; likely secondary to hyperglycemia  A1c send out/in process  Discussed patient case with " Cloretta Given both agreed to place patient on inpatient observational status under the care of Dr Shelia Moreno, internal medicine  Negative troponin, doubt ACS component  Unremarkable TSH, doubt thyroid pathology    Patient with verbal understanding of all clinical laboratory and imaging findings, admission instructions and verbalized agreement with patient current treatment plan  Amount and/or Complexity of Data Reviewed  Labs: ordered  Decision-making details documented in ED Course  Radiology: ordered and independent interpretation performed  Decision-making details documented in ED Course  ECG/medicine tests:  Decision-making details documented in ED Course  Risk  OTC drugs  Prescription drug management  Decision regarding hospitalization  Disposition  Final diagnoses:   Hyperglycemia   Fatigue     Time reflects when diagnosis was documented in both MDM as applicable and the Disposition within this note     Time User Action Codes Description Comment    4/15/2023  4:18 AM Elisabeth Fuelling Add [R73 9] Hyperglycemia     4/15/2023  4:18 AM Elisabeth Fuelling Add [R53 83] Fatigue       ED Disposition     ED Disposition   Admit    Condition   Stable    Date/Time   Sat Apr 15, 2023  4:18 AM    Comment   Case was discussed with Justin Mckay and the patient's admission status was agreed to be Admission Status: observation status to the service of Dr Shelia Moreno, Internal Medicine   Follow-up Information    None         Patient's Medications   Discharge Prescriptions    No medications on file       No discharge procedures on file      PDMP Review     None          ED Provider  Electronically Signed by           Jp Cash PA-C  04/15/23 2026

## 2023-04-15 NOTE — ASSESSMENT & PLAN NOTE
Lab Results   Component Value Date    HGBA1C 7 4 (H) 03/09/2019     · Presents with feeling weak  Notes soft blood pressures at home  Previously was on Metformin, but was stopped as blood sugars improved following weight loss  Unfortunately has lost PCP care as they have now retired  · Baseline labs show glucose of 721  Normal pH, no anion gap  Beta hydroxybutyrate 1 1  · S/p IVF bolus, 5 units regular insulin  Is to receive another 5 units IV insulin  · Likely needs insulin drip     · Continue IV fluids

## 2023-04-15 NOTE — CONSULTS
Consultation - Vamshi Muro 57 y o  female MRN: [id number]    Unit/Bed#: ED-06 Encounter: [id number]      Assessment/Plan     Assessment/Plan:  1  Type 2 diabetes with hyperglycemia: Blood sugars remain significantly elevated  No biochemical evidence of DKA  Continue IV fluids per primary team   Agree with basal bolus insulin regimen  We will give first dose of Lantus now and schedule it for daily in the morning  While inpatient our team will continue to follow and make changes as needed  Recommend outpatient follow-up with endocrinology in the next few weeks after discharge  Discussed with patient that given significant A1c and hyperglycemia would recommend insulin regimen as outpatient for now  Upon discharge, patient will need prescription for insulin pens, insulin pen needles, glucometer, test strips, lancets  Would recommend she check her blood sugars before meals and at bedtime  Upon discharge, if Lantus is not the preferred basal insulin for the patient's insurance company, we can use Tresiba, Basaglar, Levemir, Toujeo at the same dose instead  Upon discharge, if Humalog is not the preferred mealtime insulin for the patient's insurance, we can use NovoLog, Fiasp, Admelog, or Apidra at the same dose instead  Consults    CC: Hyperglycemia    History of Present Illness     HPI: Vamshi Muro is a 57y o  year old female with history of hypertension, diabetes, Tramaine-en-Y gastric bypass who is admitted to the hospital with weakness and fatigue  Prior to presenting did note lower blood pressures but when she presented to the hospital was noted to have significant hyperglycemia of over 700 as well as acute kidney injury  The patient reports history of type 2 diabetes treated with metformin years ago  She was also on glipizide but this caused hypoglycemia so was discontinued    She then underwent Tramaine-en-Y gastric bypass surgery years ago and then was able to be titrated off of her antihyperglycemic agents  She denies known prior history of microvascular complications or macrovascular complications of diabetes  She does report a weight loss about 30 pounds over the past 1 5 years  She does report polyuria and polydipsia but she does state she drinks water frequently to stay hydrated  She does report overall feeling better  Review of Systems   Constitutional: Positive for fatigue and unexpected weight change  Negative for appetite change  HENT: Negative for congestion and trouble swallowing  Eyes: Negative for visual disturbance  Respiratory: Negative for shortness of breath  Cardiovascular: Negative for palpitations and leg swelling  Gastrointestinal: Negative for abdominal pain, nausea and vomiting  Endocrine: Positive for polydipsia and polyuria  Genitourinary: Negative for difficulty urinating and frequency  Musculoskeletal: Negative for arthralgias  Skin: Negative for rash  Neurological: Negative for dizziness and weakness  Psychiatric/Behavioral: Negative for agitation and confusion  Historical Information   Past Medical History:   Diagnosis Date   • Hypertension      Past Surgical History:   Procedure Laterality Date   • BARIATRIC SURGERY     •  SECTION      32 years ago (2019)     Social History   Social History     Substance and Sexual Activity   Alcohol Use Not Currently     Social History     Substance and Sexual Activity   Drug Use Not Currently     Social History     Tobacco Use   Smoking Status Former   • Types: Cigarettes   Smokeless Tobacco Never   Tobacco Comments    20 years ago       Family History:   Family History   Problem Relation Age of Onset   • Arrhythmia Mother         atrial flutter   • Skin cancer Mother    • Hypertension Father    • Hyperlipidemia Father    • Atrial fibrillation Sister    • Atrial fibrillation Brother    • Other Brother         sarosis of liver   • Heart attack Paternal Uncle    • Breast cancer Maternal Grandmother    • Throat cancer Maternal Grandfather    • Stroke Neg Hx    • Anuerysm Neg Hx    • Clotting disorder Neg Hx    • Heart failure Neg Hx    • Coronary artery disease Neg Hx        Meds/Allergies   Current Facility-Administered Medications   Medication Dose Route Frequency Provider Last Rate Last Admin   • acetaminophen (TYLENOL) tablet 650 mg  650 mg Oral Q6H PRN FRANKLYN Crawford       • calcium carbonate (OYSTER SHELL,OSCAL) 500 mg tablet 1 tablet  1 tablet Oral BID With Meals FRANKLYN Crawford       • cholecalciferol (VITAMIN D3) tablet 1,000 Units  1,000 Units Oral Daily FRANKLYN Crawford       • cyanocobalamin (VITAMIN B-12) tablet 100 mcg  100 mcg Oral Every Other Day FRANKLYN Crawford       • insulin glargine (LANTUS) subcutaneous injection 23 Units 0 23 mL  23 Units Subcutaneous HS FRANKLYN Crawford       • insulin lispro (HumaLOG) 100 units/mL subcutaneous injection 1-5 Units  1-5 Units Subcutaneous HS FRANKLYN Crawford       • insulin lispro (HumaLOG) 100 units/mL subcutaneous injection 1-6 Units  1-6 Units Subcutaneous TID [facility] [place] FRANKLYN Crawford       • insulin lispro (HumaLOG) 100 units/mL subcutaneous injection 7 Units  7 Units Subcutaneous TID With Meals FRANKLYN Crawford   7 Units at 04/15/23 0591   • melatonin tablet 3 mg  3 mg Oral HS PRN FRANKLYN Crawford       • metoprolol succinate (TOPROL-XL) 24 hr tablet 25 mg  25 mg Oral Daily FRANKLYN Crawford       • multivitamin stress formula tablet 1 tablet  1 tablet Oral Daily FRANKLYN Crawford       • senna-docusate sodium (SENOKOT S) 8 6-50 mg per tablet 2 tablet  2 tablet Oral HS PRN FRANKLYN Crawford       • simethicone (MYLICON) chewable tablet 80 mg  80 mg Oral 4x Daily PRN FRANKLYN Crawford       • sodium chloride 0 9 % infusion  125 mL/hr Intravenous Continuous Jose Elias Cortez PA-C 125 mL/hr at 04/15/23 0402 125 mL/hr at 04/15/23 0402     Current Outpatient Medications   Medication Sig Dispense Refill   • calcium citrate (CALCITRATE) 950 MG tablet Take 1 tablet by mouth daily     • cholecalciferol (VITAMIN D3) 1,000 units tablet Take 1,000 Units by mouth daily     • cyanocobalamin 100 MCG tablet Take 100 mcg by mouth every other day      • lisinopril (ZESTRIL) 20 mg tablet Take 1 tablet (20 mg total) by mouth daily 90 tablet 0   • lisinopril (ZESTRIL) 20 mg tablet Take 1 tablet (20 mg total) by mouth daily 90 tablet 3   • lisinopril-hydrochlorothiazide (PRINZIDE,ZESTORETIC) 20-25 MG per tablet TAKE ONE TABLET BY MOUTH ONCE DAILY 90 tablet 0   • metoprolol succinate (TOPROL-XL) 25 mg 24 hr tablet TAKE ONE TABLET BY MOUTH ONCE DAILY 90 tablet 0   • multivitamin (THERAGRAN) TABS Take 1 tablet by mouth daily     • Nutritional Supplements (QUINOA KALE & HEMP PO) Take by mouth     • triamcinolone (KENALOG) 0 1 % cream Apply topically Twice daily       No Known Allergies    Objective   Vitals: Blood pressure 121/75, pulse 96, temperature 98 3 °F (36 8 °C), temperature source Oral, resp  rate 16, weight 92 1 kg (203 lb 0 7 oz), SpO2 97 %  Intake/Output Summary (Last 24 hours) at 4/15/2023 0857  Last data filed at 4/15/2023 0610  Gross per 24 hour   Intake 1000 ml   Output --   Net 1000 ml     Invasive Devices     Peripheral Intravenous Line  Duration           Peripheral IV 04/15/23 Left Antecubital <1 day                Physical Exam  Vitals reviewed  Constitutional:       General: She is not in acute distress  Appearance: She is well-developed  She is not diaphoretic  HENT:      Head: Normocephalic and atraumatic  Eyes:      Conjunctiva/sclera: Conjunctivae normal       Pupils: Pupils are equal, round, and reactive to light  Neck:      Thyroid: No thyromegaly  Cardiovascular:      Rate and Rhythm: Normal rate and regular rhythm  Pulmonary:      Effort: Pulmonary effort is normal  No respiratory distress  Breath sounds: Normal breath sounds  Abdominal:      General: Bowel sounds are normal       Palpations: Abdomen is soft  "  Musculoskeletal:      Cervical back: Normal range of motion and neck supple  Lymphadenopathy:      Cervical: No cervical adenopathy  Skin:     General: Skin is warm and dry  Findings: No rash  Neurological:      Mental Status: She is alert and oriented to person, place, and time  Psychiatric:         Behavior: Behavior normal          The history was obtained from the review of the chart, patient  Lab Results:   Results from last 7 days   Lab Units 04/15/23  0149   HEMOGLOBIN A1C % >14 0*     Lab Results   Component Value Date    WBC 7 21 04/15/2023    HGB 9 8 (L) 04/15/2023    HCT 29 3 (L) 04/15/2023    MCV 88 04/15/2023     04/15/2023     Lab Results   Component Value Date/Time    BUN 50 (H) 04/15/2023 07:30 AM    BUN 21 08/01/2015 07:46 AM     08/01/2015 07:46 AM    K 4 4 04/15/2023 07:30 AM    K 3 7 08/01/2015 07:46 AM    CL 93 (L) 04/15/2023 07:30 AM     08/01/2015 07:46 AM    CO2 24 04/15/2023 07:30 AM    CO2 31 08/01/2015 07:46 AM    CREATININE 1 18 04/15/2023 07:30 AM    CREATININE 0 55 (L) 08/01/2015 07:46 AM    AST 9 (L) 04/15/2023 01:49 AM    AST 24 08/01/2015 07:46 AM    ALT 10 04/15/2023 01:49 AM    ALT 57 08/01/2015 07:46 AM    ALB 3 7 04/15/2023 01:49 AM    ALB 3 7 08/01/2015 07:46 AM     No results for input(s): CHOL, HDL, LDL, TRIG, VLDL in the last 72 hours  No results found for: José Hale  POC Glucose (mg/dl)   Date Value   04/15/2023 457 (H)   04/15/2023 >500 (HH)       Imaging Studies: No imaging to review  Portions of the record may have been created with voice recognition software  Occasional wrong word or \"sound a like\" substitutions may have occurred due to the inherent limitations of voice recognition software  Read the chart carefully and recognize, using context, where substitutions have occurred      "

## 2023-04-16 LAB
ATRIAL RATE: 96 BPM
GLUCOSE SERPL-MCNC: 101 MG/DL (ref 65–140)
GLUCOSE SERPL-MCNC: 123 MG/DL (ref 65–140)
GLUCOSE SERPL-MCNC: 206 MG/DL (ref 65–140)
GLUCOSE SERPL-MCNC: 227 MG/DL (ref 65–140)
GLUCOSE SERPL-MCNC: 262 MG/DL (ref 65–140)
P AXIS: 24 DEGREES
PR INTERVAL: 172 MS
QRS AXIS: 25 DEGREES
QRSD INTERVAL: 100 MS
QT INTERVAL: 358 MS
QTC INTERVAL: 452 MS
T WAVE AXIS: 33 DEGREES
VENTRICULAR RATE: 96 BPM

## 2023-04-16 RX ORDER — INSULIN GLARGINE 100 [IU]/ML
25 INJECTION, SOLUTION SUBCUTANEOUS EVERY MORNING
Status: DISCONTINUED | OUTPATIENT
Start: 2023-04-16 | End: 2023-04-17 | Stop reason: HOSPADM

## 2023-04-16 RX ORDER — INSULIN LISPRO 100 [IU]/ML
9 INJECTION, SOLUTION INTRAVENOUS; SUBCUTANEOUS
Status: DISCONTINUED | OUTPATIENT
Start: 2023-04-16 | End: 2023-04-17 | Stop reason: HOSPADM

## 2023-04-16 RX ADMIN — INSULIN GLARGINE 25 UNITS: 100 INJECTION, SOLUTION SUBCUTANEOUS at 08:31

## 2023-04-16 RX ADMIN — B-COMPLEX W/ C & FOLIC ACID TAB 1 TABLET: TAB at 08:31

## 2023-04-16 RX ADMIN — INSULIN LISPRO 9 UNITS: 100 INJECTION, SOLUTION INTRAVENOUS; SUBCUTANEOUS at 11:37

## 2023-04-16 RX ADMIN — INSULIN LISPRO 3 UNITS: 100 INJECTION, SOLUTION INTRAVENOUS; SUBCUTANEOUS at 08:34

## 2023-04-16 RX ADMIN — SODIUM CHLORIDE 125 ML/HR: 0.9 INJECTION, SOLUTION INTRAVENOUS at 08:38

## 2023-04-16 RX ADMIN — SODIUM CHLORIDE 125 ML/HR: 0.9 INJECTION, SOLUTION INTRAVENOUS at 01:04

## 2023-04-16 RX ADMIN — ACETAMINOPHEN 650 MG: 325 TABLET ORAL at 14:24

## 2023-04-16 RX ADMIN — INSULIN LISPRO 9 UNITS: 100 INJECTION, SOLUTION INTRAVENOUS; SUBCUTANEOUS at 08:34

## 2023-04-16 RX ADMIN — INSULIN LISPRO 9 UNITS: 100 INJECTION, SOLUTION INTRAVENOUS; SUBCUTANEOUS at 16:48

## 2023-04-16 RX ADMIN — Medication 1 TABLET: at 08:31

## 2023-04-16 RX ADMIN — Medication 1 TABLET: at 16:47

## 2023-04-16 RX ADMIN — Medication 1000 UNITS: at 08:31

## 2023-04-16 RX ADMIN — INSULIN LISPRO 2 UNITS: 100 INJECTION, SOLUTION INTRAVENOUS; SUBCUTANEOUS at 11:35

## 2023-04-16 RX ADMIN — METOPROLOL SUCCINATE 25 MG: 25 TABLET, EXTENDED RELEASE ORAL at 15:28

## 2023-04-16 NOTE — PLAN OF CARE
Problem: PAIN - ADULT  Goal: Verbalizes/displays adequate comfort level or baseline comfort level  Description: Interventions:  - Encourage patient to monitor pain and request assistance  - Assess pain using appropriate pain scale  - Administer analgesics based on type and severity of pain and evaluate response  - Implement non-pharmacological measures as appropriate and evaluate response  - Consider cultural and social influences on pain and pain management  - Notify physician/advanced practitioner if interventions unsuccessful or patient reports new pain  Outcome: Progressing     Problem: INFECTION - ADULT  Goal: Absence or prevention of progression during hospitalization  Description: INTERVENTIONS:  - Assess and monitor for signs and symptoms of infection  - Monitor lab/diagnostic results  - Monitor all insertion sites, i e  indwelling lines, tubes, and drains  - Monitor endotracheal if appropriate and nasal secretions for changes in amount and color  - Streeter appropriate cooling/warming therapies per order  - Administer medications as ordered  - Instruct and encourage patient and family to use good hand hygiene technique  - Identify and instruct in appropriate isolation precautions for identified infection/condition  Outcome: Progressing     Problem: DISCHARGE PLANNING  Goal: Discharge to home or other facility with appropriate resources  Description: INTERVENTIONS:  - Identify barriers to discharge w/patient and caregiver  - Arrange for needed discharge resources and transportation as appropriate  - Identify discharge learning needs (meds, wound care, etc )  - Arrange for interpretive services to assist at discharge as needed  - Refer to Case Management Department for coordinating discharge planning if the patient needs post-hospital services based on physician/advanced practitioner order or complex needs related to functional status, cognitive ability, or social support system  Outcome: Progressing Problem: Knowledge Deficit  Goal: Patient/family/caregiver demonstrates understanding of disease process, treatment plan, medications, and discharge instructions  Description: Complete learning assessment and assess knowledge base    Interventions:  - Provide teaching at level of understanding  - Provide teaching via preferred learning methods  Outcome: Progressing     Problem: METABOLIC, FLUID AND ELECTROLYTES - ADULT  Goal: Electrolytes maintained within normal limits  Description: INTERVENTIONS:  - Monitor labs and assess patient for signs and symptoms of electrolyte imbalances  - Administer electrolyte replacement as ordered  - Monitor response to electrolyte replacements, including repeat lab results as appropriate  - Instruct patient on fluid and nutrition as appropriate  Outcome: Progressing  Goal: Fluid balance maintained  Description: INTERVENTIONS:  - Monitor labs   - Monitor I/O and WT  - Instruct patient on fluid and nutrition as appropriate  - Assess for signs & symptoms of volume excess or deficit  Outcome: Progressing  Goal: Glucose maintained within target range  Description: INTERVENTIONS:  - Monitor Blood Glucose as ordered  - Assess for signs and symptoms of hyperglycemia and hypoglycemia  - Administer ordered medications to maintain glucose within target range  - Assess nutritional intake and initiate nutrition service referral as needed  Outcome: Progressing

## 2023-04-16 NOTE — QUICK NOTE
Blood sugars are improving but still remain above 200  We will increase Lantus to 25 units daily  Increase Humalog to 9 units with each meal   Continue scale for correction      POC Glucose (mg/dl)   Date Value   04/16/2023 262 (H)   04/16/2023 227 (H)   04/15/2023 322 (H)   04/15/2023 295 (H)   04/15/2023 357 (H)   04/15/2023 340 (H)   04/15/2023 453 (H)   04/15/2023 457 (H)   04/15/2023 >500 (HH)

## 2023-04-16 NOTE — ASSESSMENT & PLAN NOTE
Lab Results   Component Value Date    HGBA1C >14 0 (H) 04/15/2023     · Presents with feeling weak  Notes soft blood pressures at home  Previously was on Metformin, but was stopped as blood sugars improved following weight loss  Unfortunately has lost PCP care as they have now retired  · Patient has been transition from insulin drip to subcu insulin  · Continue patient on insulin Lantus and lispro 3 times daily with meals  Appreciate endocrinology recommendation  · Continue patient on low-dose sliding scale insulin with Accu-Chek 4 times daily  · Counselled on diet  Patient getting Insulin education

## 2023-04-16 NOTE — UTILIZATION REVIEW
Initial Clinical Review    Admission: Date/Time/Statement:   Admission Orders (From admission, onward)     Ordered        04/15/23 0419  Place in Observation  Once                      Orders Placed This Encounter   Procedures   • Place in Observation     Standing Status:   Standing     Number of Occurrences:   1     Order Specific Question:   Level of Care     Answer:   Med Surg [16]     ED Arrival Information     Expected   -    Arrival   4/15/2023 00:28    Acuity   Urgent            Means of arrival   Walk-In    Escorted by   Spouse    Service   Hospitalist    Admission type   Emergency            Arrival complaint   Low Blood Pressure           Chief Complaint   Patient presents with   • Blood Pressure Check     Pt reports having low blood over last couple of days, states on 3 different BP meds and has recently lost over 30lbs  Hx of weight loss surgery  Initial Presentation: 57 y o  female who presented self from home to [address] [place] [address] ED  Admitted in observation status for evaluation and treatment of hyperglycemia  PMHx: HTN, T2DM, gastric bypass  Presented w/ generalized weakness, fatigue, hypotenion    On exam, tachycardic  Plan: insulin, IVF, Trend labs, replete electrolytes as needed; continue metoprolol  Endocrinology consulted: Pt w/ hyperglycemia w/o evidence of DKA  Start basal/bolus insulin regimen  Will need outpatient follow-up     04/16/23: Pt w/ improving BG but still above 200  Increase Lantus to 25 units, increase Humalog to 9 units w/ meals and SSI w/ accuchecks ACHS  HgbA1c >14 0  Continue education on diet, insulin, and BG checks  Continue PTA meds       ED Triage Vitals   Temperature Pulse Respirations Blood Pressure SpO2   04/15/23 0102 04/15/23 0056 04/15/23 0056 04/15/23 0056 04/15/23 0056   98 3 °F (36 8 °C) 104 18 118/77 98 %      Temp Source Heart Rate Source Patient Position - Orthostatic VS BP Location FiO2 (%)   04/15/23 0102 04/15/23 0056 04/15/23 0157 04/15/23 0157 --   Oral Monitor Lying Right arm       Pain Score       04/15/23 1627       No Pain          Wt Readings from Last 1 Encounters:   04/16/23 95 3 kg (210 lb 1 6 oz)     Additional Vital Signs:   Date/Time Temp Pulse Resp BP MAP (mmHg) SpO2 O2 Device   04/16/23 07:10:45 98 6 °F (37 °C) 97 18 101/68 79 96 % --   04/15/23 22:20:13 99 °F (37 2 °C) 94 14 105/64 78 95 % --   04/15/23 16:51:04 98 4 °F (36 9 °C) 98 20 110/77 88 98 % None (Room air)   04/15/23 1651 -- -- -- -- -- -- None (Room air)   04/15/23 1451 -- 88 18 95/55 69 98 % None (Room air)   04/15/23 1203 -- 90 16 98/71 80 98 % None (Room air)   04/15/23 0958 -- 89 -- 132/81 -- -- --   04/15/23 0400 -- 96 16 121/75 94 97 % None (Room air)   04/15/23 0300 -- 101 -- 133/70 95 96 % None (Room air)   04/15/23 0200 -- 96 18 106/72 84 95 % None (Room air)   04/15/23 0157 -- 97 18 119/76 -- 96 % None (Room air)     Pertinent Labs/Diagnostic Test Results:   XR chest 1 view portable   ED Interpretation by Martin Hollis PA-C (04/15 9702)   No acute cardiopulmonary disease on initial read      Final Result by Reji Costa MD (04/15 1140)      No acute cardiopulmonary disease                    Workstation performed: [id number]               Results from last 7 days   Lab Units 04/15/23  0730 04/15/23  0149   WBC Thousand/uL 7 21 7 29   HEMOGLOBIN g/dL 9 8* 11 0*   HEMATOCRIT % 29 3* 32 9*   PLATELETS Thousands/uL 333 384   NEUTROS ABS Thousands/µL 5 00 4 76         Results from last 7 days   Lab Units 04/15/23  0730 04/15/23  0313 04/15/23  0149   SODIUM mmol/L 128* 124* 123*   POTASSIUM mmol/L 4 4 4 4 5 0   CHLORIDE mmol/L 93* 86* 85*   CO2 mmol/L 24 27 27   ANION GAP mmol/L 11 11 11   BUN mg/dL 50* 58* 59*   CREATININE mg/dL 1 18 1 48* 1 51*   EGFR ml/min/1 73sq m 48 37 36   CALCIUM mg/dL 9 1 9 7 9 6   MAGNESIUM mg/dL  --   --  2 2     Results from last 7 days   Lab Units 04/15/23  0149   AST U/L 9*   ALT U/L 10   ALK PHOS U/L 103   TOTAL PROTEIN g/dL 7 6 ALBUMIN g/dL 3 7   TOTAL BILIRUBIN mg/dL 0 35     Results from last 7 days   Lab Units 04/16/23  1056 04/16/23  0712 04/16/23  0008 04/15/23  2037 04/15/23  1551 04/15/23  1258 04/15/23  1054 04/15/23  0859 04/15/23  0604 04/15/23  0313   POC GLUCOSE mg/dl 206* 262* 227* 322* 295* 357* 340* 453* 457* >500*     Results from last 7 days   Lab Units 04/15/23  0730 04/15/23  0313 04/15/23  0149   GLUCOSE RANDOM mg/dL 494* 667* 721*         Results from last 7 days   Lab Units 04/15/23  0149   HEMOGLOBIN A1C % >14 0*   EAG mg/dl >355     BETA-HYDROXYBUTYRATE   Date Value Ref Range Status   04/15/2023 1 1 (H) <0 6 mmol/L Final          Results from last 7 days   Lab Units 04/15/23  0313   PH LANIE  7 338   PCO2 LANIE mm Hg 50 1*   PO2 LANIE mm Hg 34 4*   HCO3 LANIE mmol/L 26 3   BASE EXC LANIE mmol/L 0 0   O2 CONTENT LANIE ml/dL 10 7   O2 HGB, VENOUS % 64 0             Results from last 7 days   Lab Units 04/15/23  0149   HS TNI 0HR ng/L 3         Results from last 7 days   Lab Units 04/15/23  0149   PROTIME seconds 13 9   INR  1 05   PTT seconds 28     Results from last 7 days   Lab Units 04/15/23  0149   TSH 3RD GENERATON uIU/mL 2 337       Results from last 7 days   Lab Units 04/15/23  0149   BNP pg/mL 24     Results from last 7 days   Lab Units 04/15/23  0555   CLARITY UA  Clear  Clear   COLOR UA  Colorless  Colorless   SPEC GRAV UA  1 019  1 019   PH UA  5 0  5 0   GLUCOSE UA mg/dl >=1000 (1%)*  >=1000 (1%)*   KETONES UA mg/dl Trace*  Trace*   BLOOD UA  Negative  Negative   PROTEIN UA mg/dl Negative  Negative   NITRITE UA  Negative  Negative   BILIRUBIN UA  Negative  Negative   UROBILINOGEN UA (BE) mg/dl <2 0  <2 0   LEUKOCYTES UA  Small*  Small*   WBC UA /hpf 1-2  1-2   RBC UA /hpf 1-2  1-2   BACTERIA UA /hpf Occasional  Occasional   EPITHELIAL CELLS WET PREP /hpf Occasional  Occasional       ED Treatment:   Medication Administration from 04/15/2023 0028 to 04/15/2023 1625       Date/Time Order Dose Route Action     04/15/2023 0402 EDT sodium chloride 0 9 % infusion 125 mL/hr Intravenous Rate/Dose Change     04/15/2023 0316 EDT sodium chloride 0 9 % infusion 40 mL/hr Intravenous New Bag     04/15/2023 0401 EDT sodium chloride 0 9 % bolus 1,000 mL 1,000 mL Intravenous New Bag     04/15/2023 0400 EDT insulin regular (HumuLIN R,NovoLIN R) injection 5 Units 5 Units Intravenous Given     04/15/2023 0402 EDT insulin regular (HumuLIN R,NovoLIN R) injection 5 Units 5 Units Intravenous Given     04/15/2023 0958 EDT metoprolol succinate (TOPROL-XL) 24 hr tablet 25 mg 25 mg Oral Given     04/15/2023 0957 EDT cyanocobalamin (VITAMIN B-12) tablet 100 mcg 100 mcg Oral Given     04/15/2023 0956 EDT cholecalciferol (VITAMIN D3) tablet 1,000 Units 1,000 Units Oral Given     04/15/2023 0959 EDT calcium carbonate (OYSTER SHELL,OSCAL) 500 mg tablet 1 tablet 1 tablet Oral Given     04/15/2023 7738 EDT multivitamin stress formula tablet 1 tablet 1 tablet Oral Given     04/15/2023 2729 EDT insulin lispro (HumaLOG) 100 units/mL subcutaneous injection 7 Units 7 Units Subcutaneous Given     04/15/2023 0900 EDT insulin lispro (HumaLOG) 100 units/mL subcutaneous injection 1-6 Units 6 Units Subcutaneous Given     04/15/2023 1001 EDT insulin glargine (LANTUS) subcutaneous injection 23 Units 0 23 mL 23 Units Subcutaneous Given     04/15/2023 1256 EDT insulin lispro (HumaLOG) 100 units/mL subcutaneous injection 7 Units 7 Units Subcutaneous Given        Past Medical History:   Diagnosis Date   • Hypertension      Present on Admission:  • Benign essential hypertension      Admitting Diagnosis: Fatigue [R53 83]  Hyperglycemia [R73 9]  Blood pressure check [Z01 30]  Type 2 diabetes mellitus with hyperglycemia, without long-term current use of insulin (HCC) [E11 65]  Age/Sex: 57 y o  female  Admission Orders:  Consistent Carbohydrate Diet  Accu-checks ACHS  DW  I&O      Scheduled Medications:  calcium carbonate, 1 tablet, Oral, BID With Meals  cholecalciferol, 1,000 Units, Oral, Daily  cyanocobalamin, 100 mcg, Oral, Every Other Day  insulin glargine, 25 Units, Subcutaneous, QAM  insulin lispro, 1-5 Units, Subcutaneous, HS  insulin lispro, 1-6 Units, Subcutaneous, TID AC  insulin lispro, 9 Units, Subcutaneous, TID With Meals  metoprolol succinate, 25 mg, Oral, Daily  multivitamin stress formula, 1 tablet, Oral, Daily    Continuous IV Infusions:  sodium chloride, 125 mL/hr, Intravenous, Continuous    PRN Meds:  acetaminophen, 650 mg, Oral, Q6H PRN  melatonin, 3 mg, Oral, HS PRN  ondansetron, 4 mg, Intravenous, Q4H PRN  senna-docusate sodium, 2 tablet, Oral, HS PRN  simethicone, 80 mg, Oral, 4x Daily PRN    Discontinued Meds:  insulin glargine, 23 Units, Subcutaneous, QAM  insulin lispro, 7 Units, Subcutaneous, TID With Meals      IP CONSULT TO ENDOCRINOLOGY  IP CONSULT TO NUTRITION SERVICES    Network Utilization Review Department  ATTENTION: Please call with any questions or concerns to [phone] and carefully listen to the prompts so that you are directed to the right person  All voicemails are confidential   Jermaine Johnston all requests for admission clinical reviews, approved or denied determinations and any other requests to dedicated fax number below belonging to the campus where the patient is receiving treatment   List of dedicated fax numbers for the Facilities:  [address] DENIALS (Administrative/Medical Necessity) [phone]   [address]  (Maternity/NICU/Pediatrics) [phone]   [address] [phone]   [address] [phone]   [address] [address] [facility] [place] [address] [facility] [phone]     [address] [place] [phone]   [address] [phone]

## 2023-04-16 NOTE — PROGRESS NOTES
[facility]  Progress Note  Name: Deepika Lopez  MRN: [id number]  Unit/Bed#: S -01 I Date of Admission: 4/15/2023   Date of Service: 4/16/2023 I Hospital Day: 0    Assessment/Plan   * Type 2 diabetes mellitus with hyperglycemia, without long-term current use of insulin [facility])  Assessment & Plan  Lab Results   Component Value Date    HGBA1C >14 0 (H) 04/15/2023     · Presents with feeling weak  Notes soft blood pressures at home  Previously was on Metformin, but was stopped as blood sugars improved following weight loss  Unfortunately has lost PCP care as they have now retired  · Patient has been transition from insulin drip to subcu insulin  · Continue patient on insulin Lantus and lispro 3 times daily with meals  Appreciate endocrinology recommendation  · Continue patient on low-dose sliding scale insulin with Accu-Chek 4 times daily  · Counselled on diet  Patient getting Insulin education  ANA (acute kidney injury) ([facility] [address] )  Assessment & Plan  · Creatinine 1 51, prior 0 75 (2019)  · Likely in setting of hyperglycemia, ACEI-HCTZ, poor PO intake   · Improved creatinine  Lab Results   Component Value Date    CREATININE 1 18 04/15/2023   ·   · Will d/c iv fluids today  Benign essential hypertension  Assessment & Plan  · Hold all other BP meds  Cont  Metorpolol succinate as per cards recs  Hyponatremia  Assessment & Plan  · Pseudohyponatremia  · Improved  Atrial fibrillation ([facility] [address] )  Assessment & Plan  PAF on Metoprolol  Pharmacologic VTE Prophylaxis: Yes   Mechanical VTE Prophylaxis in Place: No   Patient Centered Rounds: I have performed bedside rounds with the Nursing staff today  Current Length of Stay: 0 day(s)  Current Patient Status: Observation   Code Status: Level 1 - Full Code  Time Spent for Care:  35 minutes  More than 50% of total time spent on counseling and coordination of care as described above    Discussions with Specialists or Other Care Team Provider: Yes  Education and Discussions with Family / Patient: No, tried calling spouse and could not leave message as mail box full  Discharge Plan: 24- hrs  Case Discussed with  regarding updating plan of care and disposition planning  Certification Statement: The patient will continue to require additional inpatient hospital stay due to DM type II uncontrolled  Subjective:   I have seen and Examined the patient at the bedside  No CP or Sob  No fevers or chills, No nausea or vomiting  Overnight events reviewed with the RN  No Other complains  Review of System:   Denies any CP or SOB  Denies any Cough or Cold  Denies any Fevers or chills  Denies any focal tingling numbness or weakness in any extremities  Denies any abdominal pain, Nausea or vomiting  Objective:   Temp (24hrs), Av 7 °F (37 1 °C), Min:98 4 °F (36 9 °C), Max:99 °F (37 2 °C)    Temp:  [98 4 °F (36 9 °C)-99 °F (37 2 °C)] 98 6 °F (37 °C)  HR:  [88-98] 97  Resp:  [14-20] 18  BP: ()/(55-77) 101/68  SpO2:  [95 %-98 %] 96 %  Body mass index is 31 95 kg/m²  Input and Output Summary (last 24 hours): Intake/Output Summary (Last 24 hours) at 2023 1306  Last data filed at 2023 0838  Gross per 24 hour   Intake 3597 33 ml   Output 1600 ml   Net 1997 33 ml     I/O        0701  04/15 0700 04/15 0701   0700  0701   0700    I V  (mL/kg)  2651 5 (27 8) 945 8 (9 9)    IV Piggyback 1000      Total Intake(mL/kg) 1000 (10 9) 2651 5 (27 8) 945 8 (9 9)    Urine (mL/kg/hr)  1600 (0 7)     Total Output  1600     Net +1000 +1051 5 +945 8                 Physical Exam:   General : Alert, Awake and oriented x 2-3, NAD  Neck : Supple  Eyes:  HERMES, EOMI  CVS : S1, S2, RRR    R/S : Clear to auscultate anteriorly  Abd: Soft, NT, ND  Bs+ve  Extremity: Trace pedal edema noted  Skin: No acute Rash noted  CNS: No acute FND       Additional Data:     Labs, Culture & Imaging Data Reviewed:    Results from last 7 days   Lab Units 04/15/23  0730   WBC Thousand/uL 7 21   HEMOGLOBIN g/dL 9 8*   HEMATOCRIT % 29 3*   PLATELETS Thousands/uL 333     Results from last 7 days   Lab Units 04/15/23  0730 04/15/23  0313 04/15/23  0149   POTASSIUM mmol/L 4 4   < > 5 0   CHLORIDE mmol/L 93*   < > 85*   CO2 mmol/L 24   < > 27   BUN mg/dL 50*   < > 59*   CREATININE mg/dL 1 18   < > 1 51*   CALCIUM mg/dL 9 1   < > 9 6   ALK PHOS U/L  --   --  103   ALT U/L  --   --  10   AST U/L  --   --  9*    < > = values in this interval not displayed  Results from last 7 days   Lab Units 04/15/23  0149   INR  1 05     Lab Results   Component Value Date    HGBA1C >14 0 (H) 04/15/2023      XR chest 1 view portable   ED Interpretation by Candi Garay PA-C (04/15 1413)   No acute cardiopulmonary disease on initial read      Final Result by Lina Preston MD (04/15 4380)      No acute cardiopulmonary disease                    Workstation performed: [id number]             Cultures:   Blood Culture: No results found for: BLOODCX  Urine Culture: No results found for: URINECX  Sputum Culture: No components found for: SPUTUMCX  Wound Culture: No results found for: WOUNDCULT    Last 24 Hours Medication List:   Current Facility-Administered Medications   Medication Dose Route Frequency Provider Last Rate   • acetaminophen  650 mg Oral Q6H PRN FRANKLYN Jaimes     • calcium carbonate  1 tablet Oral BID With Meals FRANKLYN Jaimes     • cholecalciferol  1,000 Units Oral Daily FRANKLYN Jaimes     • cyanocobalamin  100 mcg Oral Every Other Day FRANKLYN Jaimes     • insulin glargine  25 Units Subcutaneous QAM Rey Krabbe, DO     • insulin lispro  1-5 Units Subcutaneous HS FRANKLYN Jaimes     • insulin lispro  1-6 Units Subcutaneous TID [facility] FRANKLYN Jaimes     • insulin lispro  9 Units Subcutaneous TID With Meals Rey Krabbe, DO     • melatonin  3 mg Oral HS PRN Braden Slocumb, CRNP     • metoprolol succinate  25 mg Oral Daily FRANKLYN Crawford     • multivitamin stress formula  1 tablet Oral Daily FRANKLYN Crawford     • ondansetron  4 mg Intravenous Q4H PRN Mj Madsen MD     • senna-docusate sodium  2 tablet Oral HS PRN FRANKLYN Crawford     • simethicone  80 mg Oral 4x Daily PRN FRANKLYN Crawford     • sodium chloride  125 mL/hr Intravenous Continuous Nelly Curry  mL/hr (04/16/23 0704)         Patient is at moderate risk for morbidity and mortality due to above mentioned illness and comorbidities

## 2023-04-16 NOTE — ASSESSMENT & PLAN NOTE
· Creatinine 1 51, prior 0 75 (2019)  · Likely in setting of hyperglycemia, ACEI-HCTZ, poor PO intake   · Improved creatinine  Lab Results   Component Value Date    CREATININE 1 18 04/15/2023   ·   · Will d/c iv fluids today

## 2023-04-17 VITALS
SYSTOLIC BLOOD PRESSURE: 148 MMHG | HEART RATE: 95 BPM | HEIGHT: 68 IN | BODY MASS INDEX: 31.84 KG/M2 | DIASTOLIC BLOOD PRESSURE: 94 MMHG | WEIGHT: 210.1 LBS | OXYGEN SATURATION: 95 % | RESPIRATION RATE: 18 BRPM | TEMPERATURE: 98 F

## 2023-04-17 PROBLEM — N17.9 AKI (ACUTE KIDNEY INJURY) (HCC): Status: RESOLVED | Noted: 2023-04-15 | Resolved: 2023-04-17

## 2023-04-17 LAB
ATRIAL RATE: 89 BPM
CHOLEST SERPL-MCNC: 182 MG/DL
GLUCOSE SERPL-MCNC: 123 MG/DL (ref 65–140)
GLUCOSE SERPL-MCNC: 193 MG/DL (ref 65–140)
GLUCOSE SERPL-MCNC: 214 MG/DL (ref 65–140)
HDLC SERPL-MCNC: 38 MG/DL
LDLC SERPL CALC-MCNC: 109 MG/DL (ref 0–100)
P AXIS: 38 DEGREES
PR INTERVAL: 160 MS
QRS AXIS: 24 DEGREES
QRSD INTERVAL: 96 MS
QT INTERVAL: 362 MS
QTC INTERVAL: 440 MS
T WAVE AXIS: 26 DEGREES
TRIGL SERPL-MCNC: 173 MG/DL
VENTRICULAR RATE: 89 BPM

## 2023-04-17 RX ORDER — INSULIN DEGLUDEC INJECTION 100 U/ML
25 INJECTION, SOLUTION SUBCUTANEOUS EVERY MORNING
Qty: 15 ML | Refills: 1 | Status: CANCELLED | OUTPATIENT
Start: 2023-04-17

## 2023-04-17 RX ORDER — BLOOD-GLUCOSE METER
KIT MISCELLANEOUS
Qty: 1 KIT | Refills: 0 | Status: SHIPPED | OUTPATIENT
Start: 2023-04-17

## 2023-04-17 RX ORDER — BLOOD SUGAR DIAGNOSTIC
STRIP MISCELLANEOUS
Qty: 200 EACH | Refills: 0 | Status: CANCELLED | OUTPATIENT
Start: 2023-04-17

## 2023-04-17 RX ORDER — PEN NEEDLE, DIABETIC 32GX 5/32"
NEEDLE, DISPOSABLE MISCELLANEOUS
Qty: 100 EACH | Refills: 0 | Status: SHIPPED | OUTPATIENT
Start: 2023-04-17

## 2023-04-17 RX ORDER — INSULIN LISPRO 200 [IU]/ML
9 INJECTION, SOLUTION SUBCUTANEOUS
Qty: 6 ML | Refills: 0 | Status: SHIPPED | OUTPATIENT
Start: 2023-04-17 | End: 2023-04-17 | Stop reason: SDUPTHER

## 2023-04-17 RX ORDER — BLOOD-GLUCOSE METER
KIT MISCELLANEOUS
Qty: 1 KIT | Refills: 0 | Status: CANCELLED | OUTPATIENT
Start: 2023-04-17

## 2023-04-17 RX ORDER — INSULIN LISPRO 200 [IU]/ML
5 INJECTION, SOLUTION SUBCUTANEOUS
Qty: 6 ML | Refills: 0 | Status: SHIPPED | OUTPATIENT
Start: 2023-04-17 | End: 2023-04-25

## 2023-04-17 RX ORDER — LANCETS 33 GAUGE
EACH MISCELLANEOUS
Qty: 200 EACH | Refills: 0 | Status: CANCELLED | OUTPATIENT
Start: 2023-04-17

## 2023-04-17 RX ORDER — BLOOD SUGAR DIAGNOSTIC
STRIP MISCELLANEOUS
Qty: 100 EACH | Refills: 0 | Status: CANCELLED | OUTPATIENT
Start: 2023-04-17

## 2023-04-17 RX ORDER — INSULIN LISPRO 200 [IU]/ML
5 INJECTION, SOLUTION SUBCUTANEOUS
Qty: 6 ML | Refills: 1 | Status: CANCELLED | OUTPATIENT
Start: 2023-04-17

## 2023-04-17 RX ORDER — GLUCOSAMINE HCL/CHONDROITIN SU 500-400 MG
CAPSULE ORAL
Qty: 100 EACH | Refills: 0 | Status: CANCELLED | OUTPATIENT
Start: 2023-04-17

## 2023-04-17 RX ORDER — LANCETS 33 GAUGE
EACH MISCELLANEOUS
Qty: 200 EACH | Refills: 0 | Status: SHIPPED | OUTPATIENT
Start: 2023-04-17

## 2023-04-17 RX ORDER — GLUCOSAMINE HCL/CHONDROITIN SU 500-400 MG
CAPSULE ORAL
Qty: 200 EACH | Refills: 0 | Status: CANCELLED | OUTPATIENT
Start: 2023-04-17

## 2023-04-17 RX ORDER — INSULIN DETEMIR 100 [IU]/ML
25 INJECTION, SOLUTION SUBCUTANEOUS
Qty: 10 ML | Refills: 1 | Status: CANCELLED | OUTPATIENT
Start: 2023-04-17

## 2023-04-17 RX ORDER — GLUCOSAMINE HCL/CHONDROITIN SU 500-400 MG
CAPSULE ORAL
Qty: 200 EACH | Refills: 0 | Status: SHIPPED | OUTPATIENT
Start: 2023-04-17

## 2023-04-17 RX ORDER — ATORVASTATIN CALCIUM 20 MG/1
20 TABLET, FILM COATED ORAL DAILY
Qty: 30 TABLET | Refills: 0 | Status: SHIPPED | OUTPATIENT
Start: 2023-04-17

## 2023-04-17 RX ORDER — BLOOD SUGAR DIAGNOSTIC
STRIP MISCELLANEOUS
Qty: 200 EACH | Refills: 0 | Status: SHIPPED | OUTPATIENT
Start: 2023-04-17

## 2023-04-17 RX ORDER — PEN NEEDLE, DIABETIC 32GX 5/32"
NEEDLE, DISPOSABLE MISCELLANEOUS
Qty: 100 EACH | Refills: 0 | Status: CANCELLED | OUTPATIENT
Start: 2023-04-17

## 2023-04-17 RX ORDER — INSULIN DEGLUDEC INJECTION 100 U/ML
25 INJECTION, SOLUTION SUBCUTANEOUS
Qty: 15 ML | Refills: 0 | Status: SHIPPED | OUTPATIENT
Start: 2023-04-17 | End: 2023-05-01 | Stop reason: SDUPTHER

## 2023-04-17 RX ORDER — LANCETS 33 GAUGE
EACH MISCELLANEOUS
Qty: 100 EACH | Refills: 0 | Status: CANCELLED | OUTPATIENT
Start: 2023-04-17

## 2023-04-17 RX ADMIN — INSULIN LISPRO 9 UNITS: 100 INJECTION, SOLUTION INTRAVENOUS; SUBCUTANEOUS at 08:28

## 2023-04-17 RX ADMIN — B-COMPLEX W/ C & FOLIC ACID TAB 1 TABLET: TAB at 08:28

## 2023-04-17 RX ADMIN — Medication 1 TABLET: at 08:28

## 2023-04-17 RX ADMIN — METOPROLOL SUCCINATE 25 MG: 25 TABLET, EXTENDED RELEASE ORAL at 08:28

## 2023-04-17 RX ADMIN — Medication 1000 UNITS: at 08:28

## 2023-04-17 RX ADMIN — INSULIN GLARGINE 25 UNITS: 100 INJECTION, SOLUTION SUBCUTANEOUS at 08:27

## 2023-04-17 RX ADMIN — INSULIN LISPRO 2 UNITS: 100 INJECTION, SOLUTION INTRAVENOUS; SUBCUTANEOUS at 08:27

## 2023-04-17 RX ADMIN — Medication 100 MCG: at 08:28

## 2023-04-17 RX ADMIN — INSULIN LISPRO 9 UNITS: 100 INJECTION, SOLUTION INTRAVENOUS; SUBCUTANEOUS at 12:39

## 2023-04-17 NOTE — ASSESSMENT & PLAN NOTE
Lab Results   Component Value Date    HGBA1C >14 0 (H) 04/15/2023     · Presents with feeling weak  Notes soft blood pressures at home  Previously was on Metformin, but was stopped as blood sugars improved following weight loss  Unfortunately has lost PCP care as they have now retired  · Patient has been transition from insulin drip to subcu insulin  · Continue patient on insulin Lantus and lispro 3 times daily with meals  Appreciate endocrinology recommendation  · Continue patient on low-dose sliding scale insulin with Accu-Chek 4 times daily  · Counselled on diet  Patient getting Insulin education     · Patient discharged on 25 units tresiba in the morning and humalog Kwikpen U200 5 units three times a day

## 2023-04-17 NOTE — PLAN OF CARE
Problem: Potential for Falls  Goal: Patient will remain free of falls  Description: INTERVENTIONS:  - Educate patient/family on patient safety including physical limitations  - Instruct patient to call for assistance with activity   - Consult OT/PT to assist with strengthening/mobility   - Keep Call bell within reach  - Keep bed low and locked with side rails adjusted as appropriate  - Keep care items and personal belongings within reach  - Initiate and maintain comfort rounds  - Make Fall Risk Sign visible to staff  - Obtain necessary fall risk management equipment  - Apply yellow socks and bracelet for high fall risk patients  - Consider moving patient to room near nurses station  Outcome: Progressing     Problem: PAIN - ADULT  Goal: Verbalizes/displays adequate comfort level or baseline comfort level  Description: Interventions:  - Encourage patient to monitor pain and request assistance  - Assess pain using appropriate pain scale  - Administer analgesics based on type and severity of pain and evaluate response  - Implement non-pharmacological measures as appropriate and evaluate response  - Consider cultural and social influences on pain and pain management  - Notify physician/advanced practitioner if interventions unsuccessful or patient reports new pain  Outcome: Progressing     Problem: INFECTION - ADULT  Goal: Absence or prevention of progression during hospitalization  Description: INTERVENTIONS:  - Assess and monitor for signs and symptoms of infection  - Monitor lab/diagnostic results  - Monitor all insertion sites, i e  indwelling lines, tubes, and drains  - Monitor endotracheal if appropriate and nasal secretions for changes in amount and color  - [place] appropriate cooling/warming therapies per order  - Administer medications as ordered  - Instruct and encourage patient and family to use good hand hygiene technique  - Identify and instruct in appropriate isolation precautions for identified infection/condition  Outcome: Progressing     Problem: SAFETY ADULT  Goal: Patient will remain free of falls  Description: INTERVENTIONS:  - Educate patient/family on patient safety including physical limitations  - Instruct patient to call for assistance with activity   - Consult OT/PT to assist with strengthening/mobility   - Keep Call bell within reach  - Keep bed low and locked with side rails adjusted as appropriate  - Keep care items and personal belongings within reach  - Initiate and maintain comfort rounds  - Make Fall Risk Sign visible to staff  - Obtain necessary fall risk management equipment  - Apply yellow socks and bracelet for high fall risk patients  - Consider moving patient to room near nurses station  Outcome: Progressing     Problem: DISCHARGE PLANNING  Goal: Discharge to home or other facility with appropriate resources  Description: INTERVENTIONS:  - Identify barriers to discharge w/patient and caregiver  - Arrange for needed discharge resources and transportation as appropriate  - Identify discharge learning needs (meds, wound care, etc )  - Arrange for interpretive services to assist at discharge as needed  - Refer to Case Management Department for coordinating discharge planning if the patient needs post-hospital services based on physician/advanced practitioner order or complex needs related to functional status, cognitive ability, or social support system  Outcome: Progressing     Problem: Knowledge Deficit  Goal: Patient/family/caregiver demonstrates understanding of disease process, treatment plan, medications, and discharge instructions  Description: Complete learning assessment and assess knowledge base    Interventions:  - Provide teaching at level of understanding  - Provide teaching via preferred learning methods  Outcome: Progressing     Problem: METABOLIC, FLUID AND ELECTROLYTES - ADULT  Goal: Electrolytes maintained within normal limits  Description: INTERVENTIONS:  - Monitor labs and assess patient for signs and symptoms of electrolyte imbalances  - Administer electrolyte replacement as ordered  - Monitor response to electrolyte replacements, including repeat lab results as appropriate  - Instruct patient on fluid and nutrition as appropriate  Outcome: Progressing  Goal: Fluid balance maintained  Description: INTERVENTIONS:  - Monitor labs   - Monitor I/O and WT  - Instruct patient on fluid and nutrition as appropriate  - Assess for signs & symptoms of volume excess or deficit  Outcome: Progressing  Goal: Glucose maintained within target range  Description: INTERVENTIONS:  - Monitor Blood Glucose as ordered  - Assess for signs and symptoms of hyperglycemia and hypoglycemia  - Administer ordered medications to maintain glucose within target range  - Assess nutritional intake and initiate nutrition service referral as needed  Outcome: Progressing

## 2023-04-17 NOTE — DISCHARGE INSTR - AVS FIRST PAGE
Dear Sharon Nash,     It was our pleasure to care for you here at [facility]  It is our hope that we were always able to exceed the expected standards for your care during your stay  You were hospitalized due to hyperglycemia  You were cared for on the 4th floor by Elsa Morataya DO under the service of Cher Onofre MD with the [facility] who covers for your primary care physician (PCP), No primary care provider on file  , while you were hospitalized  If you have any questions or concerns related to this hospitalization, you may contact us at [phone]  For follow up as well as any medication refills, we recommend that you follow up with your primary care physician  A registered nurse will reach out to you by phone within a few days after your discharge to answer any additional questions that you may have after going home  However, at this time we provide for you here, the most important instructions / recommendations at discharge:     Notable Medication Adjustments -   Please take Tresiba 25 units by injection in the morning  Please take humalog kwikpen 5 units three times a day by injection with meals  Please take Lipitor 20 mg by mouth daily at bedtime  Please check your glucose levels by fingerstick 4-5 times a day]  Please continue to take metoprolol daily   Please stop taking lisinopril and hydrochlorothiazide  Testing Required after Discharge -   none  Important follow up information -   Please follow up with PCP outpatient  Please call and schedule an appointment within a couple days of discharge  Please follow up with endocrinology outpatient   Please call and schedule an appointment within a couple days of discharge  Plead follow up with cardiology appointment tomorrow  Please see dietitian  outpatient, referral for outpatient in place  Other Instructions -   Please go to the ED if you have chest pain, shortness of breath  If you miss a meal Please do not use the humalog at that meal  Please test your glucose before injecting insulin and don't inject insulin if you have lower levels blood glucose  Please make sure to check your glucose 4-5 times a day to avoid hyperglycemia or hypoglycemia  Please read the discharge diabetes education instructions attached  Please review this entire after visit summary as additional general instructions including medication list, appointments, activity, diet, any pertinent wound care, and other additional recommendations from your care team that may be provided for you        Sincerely,     Pepito Conn, DO

## 2023-04-17 NOTE — ASSESSMENT & PLAN NOTE
· Creatinine 1 51, prior 0 75 (2019)  · Likely in setting of hyperglycemia, ACEI-HCTZ, poor PO intake   · Improved creatinine     Lab Results   Component Value Date    CREATININE 1 18 04/15/2023

## 2023-04-18 ENCOUNTER — TRANSITIONAL CARE MANAGEMENT (OUTPATIENT)
Dept: FAMILY MEDICINE CLINIC | Facility: MEDICAL CENTER | Age: 63
End: 2023-04-18

## 2023-04-21 ENCOUNTER — TELEPHONE (OUTPATIENT)
Dept: ENDOCRINOLOGY | Facility: CLINIC | Age: 63
End: 2023-04-21

## 2023-04-21 NOTE — TELEPHONE ENCOUNTER
Pt called left message on voicemail at 3:59pm on 4/20/23, she wanted to know why hasnt anyone called her from diabetic education? Will contact education and call pt today

## 2023-04-25 ENCOUNTER — TELEPHONE (OUTPATIENT)
Dept: ENDOCRINOLOGY | Facility: CLINIC | Age: 63
End: 2023-04-25

## 2023-04-25 NOTE — TELEPHONE ENCOUNTER
I think we may be able to discontinue the Humalog at this point and monitor just on Tresiba and ask her to send in glucose logs again next week to see how her numbers are doing  Let me know any questions

## 2023-04-25 NOTE — TELEPHONE ENCOUNTER
Called pt, pt verbalized she understands to stop the humalog and just to remain on the tresiba  She will send in her sugars logs and will call if she has questions or is not feeling well   Patricia Barnett

## 2023-04-25 NOTE — TELEPHONE ENCOUNTER
Pt LMOM questioning when she should be taking her insulin, or how much  She stated she was just about to eat lunch and her BS was 98, but the other day when her BS was around the same number, she took her insulin and began having hypoglycemia symptoms  Had to take measures to increase her BS to relieve symptoms  Please advise

## 2023-04-26 NOTE — TELEPHONE ENCOUNTER
"Patient called again today reporting that she discontinued Humalog as instructed yesterday  She stated her blood glucose level was at 188 when she woke up today  Per Penny Olmos, she injected 25 units of Tresiba at 8:30 am just prior to eating breakfast  She check her blood sugar at 10:30 and it was 265  She would like to know if she should continue with Dr Fry Reason recommendations from yesterday  I advised the patient I would let Dr Kamille Walsh know what was going on and call her back with any reccomendations  Penny Olmos verbalized understanding and agreement with the plan  She confirmed that she is keeping a log of all of her readings  When asked how she was feeling and if she had any symptoms she stated her \"eyes felt weird\" and she was having a hard time reading her computer screen at work today so she left to work from home the rest of the day    "

## 2023-04-26 NOTE — TELEPHONE ENCOUNTER
Can we obtain the logs of her sugar logs to review trends if we should consider adding back Humalog at a lower dose or if we can increase [place] and/or add a non insulin options? Thanks

## 2023-04-27 ENCOUNTER — TELEPHONE (OUTPATIENT)
Dept: ENDOCRINOLOGY | Facility: CLINIC | Age: 63
End: 2023-04-27

## 2023-04-27 DIAGNOSIS — E11.65 TYPE 2 DIABETES MELLITUS WITH HYPERGLYCEMIA, WITHOUT LONG-TERM CURRENT USE OF INSULIN (HCC): Primary | ICD-10-CM

## 2023-04-27 NOTE — TELEPHONE ENCOUNTER
Reviewed recent blood sugar log  I think we can try to monitor blood sugars closely without adding Humalog back  Suggest considering starting metformin in place of Humalog and continue Tresiba at the current dose of 25 units daily  Send blood sugars in 1-2 weeks as well

## 2023-04-28 ENCOUNTER — OFFICE VISIT (OUTPATIENT)
Dept: FAMILY MEDICINE CLINIC | Facility: MEDICAL CENTER | Age: 63
End: 2023-04-28

## 2023-04-28 VITALS
WEIGHT: 218.4 LBS | HEIGHT: 68 IN | BODY MASS INDEX: 33.1 KG/M2 | HEART RATE: 90 BPM | TEMPERATURE: 97.3 F | OXYGEN SATURATION: 100 % | DIASTOLIC BLOOD PRESSURE: 78 MMHG | SYSTOLIC BLOOD PRESSURE: 122 MMHG

## 2023-04-28 DIAGNOSIS — Z76.89 ENCOUNTER FOR SUPPORT AND COORDINATION OF TRANSITION OF CARE: Primary | ICD-10-CM

## 2023-04-28 NOTE — PROGRESS NOTES
[facility] - Clinic Note  Sabina Livingston, 23     Thomas Gomez MRN: [id number] : 1960 Age: 57 y o  Assessment/Plan     1  Encounter for support and coordination of transition of care    -  Patient was hospitalized at [facility] from April 15, 2023 to 2023 for hyperglycemia, A1C 14  -Patient since has had follow-up with Endocrinology and Cardiology outpatient  -History of paroxysmal atrial fibrillation, continued on Toprol-XL 25 mg p o  daily  -Plan for echocardiogram, scheduled  -Patient to follow-up with Endocrinology in 2023  -Metformin 500 mg p o  twice daily and insulin degludec 25 subcu a m   -Daily statin  -Counseled patient about diabetes preventative care including yearly foot exams, yearly dilated eye exams, yearly screening for nephropathy  -Patient is due for annual physical and address meant of several preventative care measures, advised patient return to office shortly for annual physical and sooner as needed    Thomas Gomez acknowledged understanding of treatment plan, all questions answered  Subjective      Thomas Gomez is a 57 y o  female who presents for transition of care appointment  Patient was hospitalized at [facility] from April 15, 2023 to 2023 for hyperglycemia  Patient was a previous patient of Dr Gretta Covarrubias  She is new to myself  Patient was found to have A1c of 14 during hospital stay  Patient underwent bariatric surgery in the past, had weight loss and was able to discontinue metformin in the past   Patient offers she did have poor eating habits during pandemic  During hospital stay, she was transitioned from insulin drip to subcu insulin and Endocrinology was consulted  Patient was also noted to have ANA during hospital stay, nephrotoxic agents were held  Upon discharge, lisinopril-hydrochlorothiazide was not continued due to also lower end blood pressures    Patient with history of paroxysmal atrial fibrillation as well, beta-blocker was continued  TCM Call     Date and time call was made  2023  3:22 PM    Hospital care reviewed  Records reviewed    Patient was hospitialized at  [address] [place] [address]    Date of Admission  04/15/23    Date of discharge  23    Diagnosis  Type 2 Diabetes, Hyponatremia, Hypertension, Afib    Disposition  Home    Were the patients medications reviewed and updated  Yes    Current Symptoms  None      TCM Call     Post hospital issues  Poor medication adherence    Scheduled for follow up?   Yes    Patients specialists  Cardiologist; Endocrinologist    Cardiologist name  [facility]    Did you obtain your prescribed medications  Yes    Do you need help managing your prescriptions or medications  Yes    Is transportation to your appointment needed  No    I have advised the patient to call PCP with any new or worsening symptoms  Luis Brennan LPN    Are you recieving any outpatient services  No    Are you recieving home care services  No    Are you using any community resources  No    Current waiver services  No    Have you fallen in the last 12 months  No    Interperter language line needed  No    Counseling topics  Importance of RX compliance            The following portions of the patient's history were reviewed and updated as appropriate: allergies, current medications, past family history, past medical history, past social history, past surgical history and problem list      Past Medical History:   Diagnosis Date   • Diabetes mellitus ([facility] [address] ) 2023   • Hypertension    • Obesity     Had weight trell surgery       Allergies   Allergen Reactions   • Other Other (See Comments)     No Nsaids due to weight loss surgery        Past Surgical History:   Procedure Laterality Date   • BARIATRIC SURGERY     •  SECTION      32 years ago (2019)       Family History   Problem Relation Age of Onset   • Arrhythmia Mother         atrial flutter   • Skin cancer Mother    • Hypertension Father    • Hyperlipidemia Father    • Atrial fibrillation Sister    • Atrial fibrillation Brother    • Other Brother         sarosis of liver   • Heart attack Paternal Uncle    • Breast cancer Maternal Grandmother    • Throat cancer Maternal Grandfather    • Stroke Neg Hx    • Anuerysm Neg Hx    • Clotting disorder Neg Hx    • Heart failure Neg Hx    • Coronary artery disease Neg Hx        Social History     Socioeconomic History   • Marital status: /Civil Union     Spouse name: None   • Number of children: None   • Years of education: None   • Highest education level: None   Occupational History   • None   Tobacco Use   • Smoking status: Former     Packs/day: 1 00     Years: 20 00     Pack years: 20 00     Types: Cigarettes   • Smokeless tobacco: Never   • Tobacco comments:     20 years ago  Vaping Use   • Vaping Use: Never used   Substance and Sexual Activity   • Alcohol use: Never   • Drug use: Never   • Sexual activity: Not Currently     Partners: Male     Birth control/protection: Post-menopausal   Other Topics Concern   • None   Social History Narrative   • None     Social Determinants of Health     Financial Resource Strain: Not on file   Food Insecurity: Not on file   Transportation Needs: Not on file   Physical Activity: Not on file   Stress: Not on file   Social Connections: Not on file   Intimate Partner Violence: Not on file   Housing Stability: Not on file       Current Outpatient Medications   Medication Sig Dispense Refill   • Alcohol Swabs 70 % PADS May substitute brand based on insurance coverage  Check glucose ACHS  200 each 0   • atorvastatin (LIPITOR) 20 mg tablet Take 1 tablet (20 mg total) by mouth daily 30 tablet 0   • Blood Glucose Monitoring Suppl (OneTouch Verio Reflect) w/Device KIT May substitute brand based on insurance coverage  Check glucose ACHS   1 kit 0   • calcium citrate (CALCITRATE) 950 MG tablet Take 1 tablet by mouth daily     • "cholecalciferol (VITAMIN D3) 1,000 units tablet Take 1,000 Units by mouth daily     • cyanocobalamin 100 MCG tablet Take 100 mcg by mouth every other day      • glucose blood (OneTouch Verio) test strip May substitute brand based on insurance coverage  Check glucose ACHS  200 each 0   • insulin degludec Natalya Gutter FlexTouch) 100 units/mL injection pen Inject 25 Units under the skin daily in the early morning 15 mL 0   • Insulin Pen Needle (BD Pen Needle Trudy 2nd Gen) 32G X 4 MM MISC For use with insulin pen  Pharmacy may dispense brand covered by insurance  100 each 0   • metFORMIN (GLUCOPHAGE) 500 mg tablet 1 tab bid 60 tablet 1   • metoprolol succinate (TOPROL-XL) 25 mg 24 hr tablet TAKE ONE TABLET BY MOUTH ONCE DAILY 90 tablet 0   • multivitamin (THERAGRAN) TABS Take 1 tablet by mouth daily     • Nutritional Supplements (QUINOA KALE & HEMP PO) Take by mouth     • OneTouch Delica Lancets 47D MISC May substitute brand based on insurance coverage  Check glucose ACHS  200 each 0     No current facility-administered medications for this visit  Review of Systems     As noted in HPI    Objective      /78 (BP Location: Left arm, Patient Position: Sitting, Cuff Size: Large)   Pulse 90   Temp (!) 97 3 °F (36 3 °C) (Temporal)   Ht 5' 8\" (1 727 m)   Wt 99 1 kg (218 lb 6 4 oz)   SpO2 100%   BMI 33 21 kg/m²     Physical Exam  Vitals reviewed  Constitutional:       General: She is not in acute distress  Appearance: Normal appearance  She is obese  HENT:      Head: Normocephalic and atraumatic  Eyes:      Conjunctiva/sclera: Conjunctivae normal    Cardiovascular:      Rate and Rhythm: Normal rate and regular rhythm  Pulses: Normal pulses  Heart sounds: Normal heart sounds  Pulmonary:      Effort: Pulmonary effort is normal       Breath sounds: Normal breath sounds  Abdominal:      General: Abdomen is flat  Bowel sounds are normal       Palpations: Abdomen is soft  Tenderness:  There is " "no abdominal tenderness  Musculoskeletal:      Right lower le+ Edema present  Left lower le+ Edema present  Neurological:      Mental Status: She is alert and oriented to person, place, and time  Psychiatric:         Mood and Affect: Mood normal          Behavior: Behavior normal          Thought Content: Thought content normal          Judgment: Judgment normal              Some portions of this record may have been generated with voice recognition software  There may be translation, syntax, or grammatical errors  Occasional wrong word or \"sound-a-like\" substitutions may have occurred due to the inherent limitations of the voice recognition software  Read the chart carefully and recognize, using context, where substations may have occurred  If you have any questions, please contact the dictating provider for clarification or correction, as needed    "

## 2023-05-01 ENCOUNTER — TELEPHONE (OUTPATIENT)
Dept: ENDOCRINOLOGY | Facility: CLINIC | Age: 63
End: 2023-05-01

## 2023-05-01 DIAGNOSIS — R73.9 HYPERGLYCEMIA: ICD-10-CM

## 2023-05-01 RX ORDER — INSULIN DEGLUDEC INJECTION 100 U/ML
INJECTION, SOLUTION SUBCUTANEOUS
Qty: 15 ML | Refills: 0 | Status: SHIPPED | OUTPATIENT
Start: 2023-05-01 | End: 2023-05-03 | Stop reason: SDUPTHER

## 2023-05-01 NOTE — TELEPHONE ENCOUNTER
Reviewed blood sugars and notes provided  Suggest reducing Tresiba to 20 units daily  Continue metformin as is  Send in sugars again in 1 week  Let me know any questions

## 2023-05-02 ENCOUNTER — OFFICE VISIT (OUTPATIENT)
Dept: DIABETES SERVICES | Facility: CLINIC | Age: 63
End: 2023-05-02

## 2023-05-02 DIAGNOSIS — E11.65 TYPE 2 DIABETES MELLITUS WITH HYPERGLYCEMIA, WITHOUT LONG-TERM CURRENT USE OF INSULIN (HCC): Primary | ICD-10-CM

## 2023-05-02 NOTE — PROGRESS NOTES
"Medical Nutrition Therapy        Assessment    Visit Type: Initial visit  Chief complaint/Medical Diagnosis/reason for visit E11 63 T2DM with hyperglycemia, without long-term current use of insulin  HPI David Morris was seen today for her initial MNT visit  Her  was also present with her during this appointment  Patient informed that she had diabetes 13 years ago but post her bariatric surgery (done in 2015) was off diabetes medications and was managing her BG levels well  David Morris told she had put on weight in past year with poor dietary habits  She informed that she prefers small and frequent meals as cannot tolerate 3 main meals of the day  Problems identified in food recall include insufficient carbohydrate intake with poor food choices and diet lacking in various food groups  Provided patient with a 1400 calorie meal plan to assist with consistency, balance and portion control  Encouraged the consumption of small and frequent meals for now  Advised patient to keep carbohydrate intake to 30 grams per meal and 15 grams per snack to assist with glycemic control  Discussed with the patient on her carbohydrate choices that she would be comfortable taking and prepared her individualized diet plan with her  Suggested keeping protein intake to 6 ounces a day and fat to 4 servings daily to assist with lipid management and calorie control  Portion booklet and food labels were used to teach basic carbohydrate counting  Patient agreed to keep daily food logs and return them in one week for assessment  RD will remain available for further dietary questions/concerns       Ht Readings from Last 1 Encounters:   04/28/23 5' 8\" (1 727 m)     Wt Readings from Last 3 Encounters:   04/28/23 99 1 kg (218 lb 6 4 oz)   04/19/23 97 1 kg (214 lb)   04/18/23 95 7 kg (211 lb)     Weight Change: No    Barriers to Learning: no barriers    Do you follow any special diet presently?: No  Who shops: patient and spouse  Who cooks: patient and " spouse    Food Log: Completed via the method of food recall    Wake up: 6:00am  Breakfast 8:30am: cottage cheese 1cup , berries 2/3 cup, 1 hard boiled egg, coffee with powder creamer and sweet and low  Morning Snack: none  Lunch 12-1:00pm:  cottage cheese 1 cup, berries 2/3 cup, egg salad - 2 eggs, mayonnaise 1/4 cup  Afternoon Snack: almonds 1/3 cup  Dinner:same as lunch  Evening Snack: none / popcorn 3 cups/ cheese slices few  Beverages: mostly water, crystal lite water  Eating out/Take out:none at present - loves chili with beans, would love to eat potato and corn but is afraid as might affect her BG levels  Exercise not beyond daily activities    Calorie needs 1400 kcals/day Carbs: 30 g/meal, 15 g/snack     Fat: 4 servings/day    Protein:6 ounces/day    Nutrition Diagnosis:  Limited adherence to nutrition related recommendations  related to Food and nutrition-related knowledge deficit concerning how to make nutrition related changes as evidenced by Uncertainty as to how to consistently apply food/nutrition information    Intervention: plate method, label reading, behavior modification strategies, carbohydrate counting and monitoring portion control     Treatment Goals: Patient understands education and recommendations, Patient will monitor portion control, Patient will consume snacks, Patient will count carbohydrates and Patient will monitor blood glucose    Monitoring and evaluation:    Term code indicator  FH 4 4 Mealtime Behavior Criteria: Prefer 4-5 small meals in a day as tolerated  Try not to skip any meals  Term code indicator  FH 1 6 3 Carbohydrate Intake Criteria: Aim 15-30 grams of carbohydrates per meal   Term code indicator  FH 1 3 2 Food Intake Criteria: Try to include foods from all the food groups  Term code indicator  CH 1 1 Personal Data Criteria: Maintain your blood sugar log       Materials Provided: portion booklet, food record log    Patients Response to Instruction:  Comprehensiongood  Motivationgood  Expected Compliancegood    Start- Stop: 9:05-10:15  Total Minutes: 75 Minutes  Group or Individual Instruction: MNT-I  Other: Lavon Montgomery      Thank you for coming to the [facility] for education today  Please feel free to call with any questions or concerns      Toribio Castillo  [address] [place] [address] Drive [id number]

## 2023-05-02 NOTE — PATIENT INSTRUCTIONS
Prefer 4-5 small meals in a day as tolerated  Try not to skip any meals  Aim 15-30 grams of carbohydrates per meal     Try to include foods from all the food groups  Maintain your blood sugar log

## 2023-05-03 ENCOUNTER — TELEPHONE (OUTPATIENT)
Dept: ENDOCRINOLOGY | Facility: CLINIC | Age: 63
End: 2023-05-03

## 2023-05-03 DIAGNOSIS — R73.9 HYPERGLYCEMIA: ICD-10-CM

## 2023-05-03 RX ORDER — INSULIN DEGLUDEC INJECTION 100 U/ML
INJECTION, SOLUTION SUBCUTANEOUS
Qty: 15 ML | Refills: 0 | Status: SHIPPED | OUTPATIENT
Start: 2023-05-03

## 2023-05-03 NOTE — TELEPHONE ENCOUNTER
Reviewed recent blood sugars  Suggest reducing Tresiba to 10 units daily and continue metformin  Continue to send in blood sugar logs for review

## 2023-05-13 ENCOUNTER — APPOINTMENT (EMERGENCY)
Dept: RADIOLOGY | Facility: HOSPITAL | Age: 63
End: 2023-05-13

## 2023-05-13 ENCOUNTER — HOSPITAL ENCOUNTER (EMERGENCY)
Facility: HOSPITAL | Age: 63
Discharge: HOME | End: 2023-05-13
Attending: EMERGENCY MEDICINE | Admitting: EMERGENCY MEDICINE

## 2023-05-13 VITALS
DIASTOLIC BLOOD PRESSURE: 59 MMHG | RESPIRATION RATE: 18 BRPM | OXYGEN SATURATION: 98 % | SYSTOLIC BLOOD PRESSURE: 128 MMHG | TEMPERATURE: 97.7 F | HEART RATE: 75 BPM

## 2023-05-13 DIAGNOSIS — R07.9 CHEST PAIN: Primary | ICD-10-CM

## 2023-05-13 LAB
2HR DELTA HS TROPONIN: 0 NG/L
ALBUMIN SERPL BCP-MCNC: 4 G/DL (ref 3.5–5)
ALP SERPL-CCNC: 66 U/L (ref 34–104)
ALT SERPL W P-5'-P-CCNC: 20 U/L (ref 7–52)
ANION GAP SERPL CALCULATED.3IONS-SCNC: 7 MMOL/L (ref 4–13)
AST SERPL W P-5'-P-CCNC: 26 U/L (ref 13–39)
BASOPHILS # BLD AUTO: 0.03 THOUSANDS/ÂΜL (ref 0–0.1)
BASOPHILS NFR BLD AUTO: 0 % (ref 0–1)
BILIRUB DIRECT SERPL-MCNC: 0.03 MG/DL (ref 0–0.2)
BILIRUB SERPL-MCNC: 0.33 MG/DL (ref 0.2–1)
BNP SERPL-MCNC: 68 PG/ML (ref 0–100)
BUN SERPL-MCNC: 28 MG/DL (ref 5–25)
CALCIUM SERPL-MCNC: 9.4 MG/DL (ref 8.4–10.2)
CARDIAC TROPONIN I PNL SERPL HS: 3 NG/L
CARDIAC TROPONIN I PNL SERPL HS: 3 NG/L
CHLORIDE SERPL-SCNC: 107 MMOL/L (ref 96–108)
CO2 SERPL-SCNC: 22 MMOL/L (ref 21–32)
CREAT SERPL-MCNC: 0.73 MG/DL (ref 0.6–1.3)
D DIMER PPP FEU-MCNC: 0.35 UG/ML FEU
EOSINOPHIL # BLD AUTO: 0.1 THOUSAND/ÂΜL (ref 0–0.61)
EOSINOPHIL NFR BLD AUTO: 1 % (ref 0–6)
ERYTHROCYTE [DISTWIDTH] IN BLOOD BY AUTOMATED COUNT: 13 % (ref 11.6–15.1)
GFR SERPL CREATININE-BSD FRML MDRD: 87 ML/MIN/1.73SQ M
GLUCOSE SERPL-MCNC: 205 MG/DL (ref 65–140)
HCT VFR BLD AUTO: 34.8 % (ref 34.8–46.1)
HGB BLD-MCNC: 10.9 G/DL (ref 11.5–15.4)
IMM GRANULOCYTES # BLD AUTO: 0.02 THOUSAND/UL (ref 0–0.2)
IMM GRANULOCYTES NFR BLD AUTO: 0 % (ref 0–2)
LYMPHOCYTES # BLD AUTO: 1.99 THOUSANDS/ÂΜL (ref 0.6–4.47)
LYMPHOCYTES NFR BLD AUTO: 27 % (ref 14–44)
MCH RBC QN AUTO: 29.3 PG (ref 26.8–34.3)
MCHC RBC AUTO-ENTMCNC: 31.3 G/DL (ref 31.4–37.4)
MCV RBC AUTO: 94 FL (ref 82–98)
MONOCYTES # BLD AUTO: 0.8 THOUSAND/ÂΜL (ref 0.17–1.22)
MONOCYTES NFR BLD AUTO: 11 % (ref 4–12)
NEUTROPHILS # BLD AUTO: 4.51 THOUSANDS/ÂΜL (ref 1.85–7.62)
NEUTS SEG NFR BLD AUTO: 61 % (ref 43–75)
NRBC BLD AUTO-RTO: 0 /100 WBCS
PLATELET # BLD AUTO: 263 THOUSANDS/UL (ref 149–390)
PMV BLD AUTO: 11.2 FL (ref 8.9–12.7)
POTASSIUM SERPL-SCNC: 4.8 MMOL/L (ref 3.5–5.3)
PROT SERPL-MCNC: 7.3 G/DL (ref 6.4–8.4)
RBC # BLD AUTO: 3.72 MILLION/UL (ref 3.81–5.12)
SODIUM SERPL-SCNC: 136 MMOL/L (ref 135–147)
WBC # BLD AUTO: 7.45 THOUSAND/UL (ref 4.31–10.16)

## 2023-05-13 NOTE — ED PROVIDER NOTES
History  Chief Complaint   Patient presents with   • Chest Pain     Patient reports intermittent chest pain x3 days  Pain a pulling sensation and reproducible with movement  Prior hx AFIB 10 yrs prior but no recent cardiac hx  History provided by:  Patient   used: No    Chest Pain  Associated symptoms: no abdominal pain, no cough, no diaphoresis, no dizziness, no fever, no headache, no nausea, no palpitations, no shortness of breath, not vomiting and no weakness      Patient is a 77-year-old female presenting to emergency department with left-sided chest pain  Started 3 days ago  Intermittent  No associated symptoms  No history of cardiac disease  History of hypertension, hyperlipidemia, diabetes  History of CAD  Smoking  No drugs  No history of PE  States believes pain is muscular due to associate nature of starting when she is trying to do something with the left hand or walking with holding something with the left hand  No known trauma  No similar pain when carrying things to the right hand  Edema lower extremities, not new  Prior to Admission Medications   Prescriptions Last Dose Informant Patient Reported? Taking? Blood Glucose Monitoring Suppl (OneTouch Verio Reflect) w/Device KIT   No No   Sig: May substitute brand based on insurance coverage  Check glucose ACHS  Insulin Pen Needle (BD Pen Needle Trudy 2nd Gen) 32G X 4 MM MISC   No No   Sig: For use with insulin pen  Pharmacy may dispense brand covered by insurance  OneTouch Delica Lancets 41Q MISC   No No   Sig: May substitute brand based on insurance coverage  Check glucose ACHS     atorvastatin (LIPITOR) 20 mg tablet   No No   Sig: Take 1 tablet (20 mg total) by mouth daily   calcium citrate (CALCITRATE) 950 MG tablet   Yes No   Sig: Take 1 tablet by mouth daily   cholecalciferol (VITAMIN D3) 1,000 units tablet   Yes No   Sig: Take 1,000 Units by mouth daily   cyanocobalamin 100 MCG tablet   Yes No   Sig: Take 100 mcg by mouth every other day    glucose blood (OneTouch Verio) test strip   No No   Sig: May substitute brand based on insurance coverage  Check glucose ACHS  insulin degludec Lorel Heath FlexTouch) 100 units/mL injection pen   No No   Sig: 10 units daily  Dose change   metFORMIN (GLUCOPHAGE) 500 mg tablet   No No   Si tab bid   metoprolol succinate (TOPROL-XL) 25 mg 24 hr tablet   No No   Sig: TAKE ONE TABLET BY MOUTH ONCE DAILY   multivitamin (THERAGRAN) TABS   Yes No   Sig: Take 1 tablet by mouth daily      Facility-Administered Medications: None       Past Medical History:   Diagnosis Date   • Diabetes mellitus ([facility] [address] ) 4    • Hypertension    • Obesity     Had weight trell surgery       Past Surgical History:   Procedure Laterality Date   • BARIATRIC SURGERY     •  SECTION      32 years ago (2019)       Family History   Problem Relation Age of Onset   • Arrhythmia Mother         atrial flutter   • Skin cancer Mother    • Hypertension Father    • Hyperlipidemia Father    • Atrial fibrillation Sister    • Atrial fibrillation Brother    • Other Brother         sarosis of liver   • Heart attack Paternal Uncle    • Breast cancer Maternal Grandmother    • Throat cancer Maternal Grandfather    • Stroke Neg Hx    • Anuerysm Neg Hx    • Clotting disorder Neg Hx    • Heart failure Neg Hx    • Coronary artery disease Neg Hx      I have reviewed and agree with the history as documented  E-Cigarette/Vaping   • E-Cigarette Use Never User      E-Cigarette/Vaping Substances   • Nicotine No    • THC No    • CBD No    • Flavoring No    • Other No    • Unknown No      Social History     Tobacco Use   • Smoking status: Former     Packs/day: 1 00     Years: 20 00     Pack years: 20 00     Types: Cigarettes   • Smokeless tobacco: Never   • Tobacco comments:     20 years ago     Vaping Use   • Vaping Use: Never used   Substance Use Topics   • Alcohol use: Never   • Drug use: Never       Review of Systems Constitutional: Negative for chills, diaphoresis and fever  HENT: Negative for congestion and sore throat  Respiratory: Negative for cough, shortness of breath, wheezing and stridor  Cardiovascular: Positive for chest pain  Negative for palpitations and leg swelling  Gastrointestinal: Negative for abdominal pain, blood in stool, diarrhea, nausea and vomiting  Genitourinary: Negative for dysuria, frequency and urgency  Musculoskeletal: Negative for neck pain and neck stiffness  Skin: Negative for pallor and rash  Neurological: Negative for dizziness, syncope, weakness, light-headedness and headaches  All other systems reviewed and are negative  Physical Exam  Physical Exam  Vitals reviewed  Constitutional:       Appearance: She is well-developed  HENT:      Head: Normocephalic and atraumatic  Eyes:      Pupils: Pupils are equal, round, and reactive to light  Cardiovascular:      Rate and Rhythm: Normal rate and regular rhythm  Heart sounds: Normal heart sounds  Pulmonary:      Effort: Pulmonary effort is normal  No respiratory distress  Breath sounds: Normal breath sounds  Abdominal:      General: Bowel sounds are normal       Palpations: Abdomen is soft  Tenderness: There is no abdominal tenderness  Musculoskeletal:         General: Normal range of motion  Cervical back: Neck supple  Right lower leg: No tenderness  Edema present  Left lower leg: No tenderness  Edema present  Skin:     General: Skin is warm and dry  Capillary Refill: Capillary refill takes less than 2 seconds  Neurological:      General: No focal deficit present  Mental Status: She is alert and oriented to person, place, and time           Vital Signs  ED Triage Vitals [05/13/23 1525]   Temperature Pulse Respirations Blood Pressure SpO2   97 7 °F (36 5 °C) 102 18 (!) 174/97 98 %      Temp Source Heart Rate Source Patient Position - Orthostatic VS BP Location FiO2 (%) Oral Monitor Sitting Right arm --      Pain Score       4           Vitals:    05/13/23 1525 05/13/23 1600 05/13/23 1857 05/13/23 2000   BP: (!) 174/97 158/77 133/65 128/59   Pulse: 102 97 72 75   Patient Position - Orthostatic VS: Sitting  Sitting          Visual Acuity      ED Medications  Medications - No data to display    Diagnostic Studies  Results Reviewed     Procedure Component Value Units Date/Time    HS Troponin I 2hr [[id number]]  (Normal) Collected: 05/13/23 1856    Lab Status: Final result Specimen: Blood from Arm, Right Updated: 05/13/23 1931     hs TnI 2hr 3 ng/L      Delta 2hr hsTnI 0 ng/L     B-Type Natriuretic Peptide(BNP) [[id number]]  (Normal) Collected: 05/13/23 1618    Lab Status: Final result Specimen: Blood from Arm, Right Updated: 05/13/23 1724     BNP 68 pg/mL     HS Troponin 0hr (reflex protocol) [[id number]]  (Normal) Collected: 05/13/23 1618    Lab Status: Final result Specimen: Blood from Arm, Right Updated: 05/13/23 1652     hs TnI 0hr 3 ng/L     Basic metabolic panel [[id number]]  (Abnormal) Collected: 05/13/23 1618    Lab Status: Final result Specimen: Blood from Arm, Right Updated: 05/13/23 1644     Sodium 136 mmol/L      Potassium 4 8 mmol/L      Chloride 107 mmol/L      CO2 22 mmol/L      ANION GAP 7 mmol/L      BUN 28 mg/dL      Creatinine 0 73 mg/dL      Glucose 205 mg/dL      Calcium 9 4 mg/dL      eGFR 87 ml/min/1 73sq m     Narrative:      Debra guidelines for Chronic Kidney Disease (CKD):   •  Stage 1 with normal or high GFR (GFR > 90 mL/min/1 73 square meters)  •  Stage 2 Mild CKD (GFR = 60-89 mL/min/1 73 square meters)  •  Stage 3A Moderate CKD (GFR = 45-59 mL/min/1 73 square meters)  •  Stage 3B Moderate CKD (GFR = 30-44 mL/min/1 73 square meters)  •  Stage 4 Severe CKD (GFR = 15-29 mL/min/1 73 square meters)  •  Stage 5 End Stage CKD (GFR <15 mL/min/1 73 square meters)  Note: GFR calculation is accurate only with a steady state creatinine Hepatic function panel [[id number]]  (Normal) Collected: 05/13/23 1618    Lab Status: Final result Specimen: Blood from Arm, Right Updated: 05/13/23 1644     Total Bilirubin 0 33 mg/dL      Bilirubin, Direct 0 03 mg/dL      Alkaline Phosphatase 66 U/L      AST 26 U/L      ALT 20 U/L      Total Protein 7 3 g/dL      Albumin 4 0 g/dL     D-Dimer [[id number]]  (Normal) Collected: 05/13/23 1618    Lab Status: Final result Specimen: Blood from Arm, Right Updated: 05/13/23 1641     D-Dimer, Quant 0 35 ug/ml FEU     Narrative: In the evaluation for possible pulmonary embolism, in the appropriate (Well's Score of 4 or less) patient, the age adjusted d-dimer cutoff for this patient can be calculated as:    Age x 0 01 (in ug/mL) for Age-adjusted D-dimer exclusion threshold for a patient over 50 years      CBC and differential [[id number]]  (Abnormal) Collected: 05/13/23 1618    Lab Status: Final result Specimen: Blood from Arm, Right Updated: 05/13/23 1627     WBC 7 45 Thousand/uL      RBC 3 72 Million/uL      Hemoglobin 10 9 g/dL      Hematocrit 34 8 %      MCV 94 fL      MCH 29 3 pg      MCHC 31 3 g/dL      RDW 13 0 %      MPV 11 2 fL      Platelets 331 Thousands/uL      nRBC 0 /100 WBCs      Neutrophils Relative 61 %      Immat GRANS % 0 %      Lymphocytes Relative 27 %      Monocytes Relative 11 %      Eosinophils Relative 1 %      Basophils Relative 0 %      Neutrophils Absolute 4 51 Thousands/µL      Immature Grans Absolute 0 02 Thousand/uL      Lymphocytes Absolute 1 99 Thousands/µL      Monocytes Absolute 0 80 Thousand/µL      Eosinophils Absolute 0 10 Thousand/µL      Basophils Absolute 0 03 Thousands/µL                  XR chest 1 view portable   ED Interpretation by Kayla Bowden MD (05/13 1614)   No acute findings                 Procedures  Procedures         ED Course  ED Course as of 05/14/23 0136   Sat May 13, 2023   1611 ECG shows rate of 96, sinus, normal axis, normal QRS, no significant ST or T wave changes, normal intervals, independently interpreted by me   1755 Discussed results with patient  She does not want to be admitted for observation  Will repeat second troponin  HEART Risk Score    Flowsheet Row Most Recent Value   Heart Score Risk Calculator    History 1 Filed at: 05/13/2023 1950   ECG 0 Filed at: 05/13/2023 1950   Age 1 Filed at: 05/13/2023 1950   Risk Factors 2 Filed at: 05/13/2023 1950   Troponin 0 Filed at: 05/13/2023 1950   HEART Score 4 Filed at: 05/13/2023 1950                        SBIRT 20yo+    Flowsheet Row Most Recent Value   Initial Alcohol Screen: US AUDIT-C     1  How often do you have a drink containing alcohol? 0 Filed at: 05/13/2023 1525   2  How many drinks containing alcohol do you have on a typical day you are drinking? 0 Filed at: 05/13/2023 1525   3a  Male UNDER 65: How often do you have five or more drinks on one occasion? 0 Filed at: 05/13/2023 1525   3b  FEMALE Any Age, or MALE 65+: How often do you have 4 or more drinks on one occassion? 0 Filed at: 05/13/2023 1525   Audit-C Score 0 Filed at: 05/13/2023 1525   RAMONITA: How many times in the past year have you    Used an illegal drug or used a prescription medication for non-medical reasons? Never Filed at: 05/13/2023 1525                    Medical Decision Making  Pt with chest pain, will do EKG to evaluate for ischemia, troponin to evaluate for cardiac injury, D-dimer to evaluate for pulmonary embolus, check CBC to eval for anemia, check CMP to evaluate electrolyte abnormalities, chest x-ray to eval for pneumonia or pneumothorax  Amount and/or Complexity of Data Reviewed  Labs: ordered  Radiology: ordered and independent interpretation performed            Disposition  Final diagnoses:   Chest pain     Time reflects when diagnosis was documented in both MDM as applicable and the Disposition within this note     Time User Action Codes Description Comment    5/13/2023  7:50 PM Ewelina Sands Add [R07 9] Chest pain       ED Disposition     ED Disposition   Discharge    Condition   Stable    Date/Time   Sat May 13, 2023  7:50 PM    Comment   Sloane Davison discharge to home/self care  Follow-up Information     Follow up With Specialties Details Why Contact Info Additional [address], DO Family Medicine Schedule an appointment as soon as possible for a visit   [address] [id number]  [address] [facility]  As needed, If symptoms worsen [address] [facility] [address] [facility] Emergency Department, [address], [place], [place], [address] [facility] Cardiology [facility] Cardiology Schedule an appointment as soon as possible for a visit  chest pain [address]  [place] [phone] [address] Cardiology [facility], [facility] [place], [place], [place] [facility] [address]          Discharge Medication List as of 5/13/2023  7:51 PM      CONTINUE these medications which have NOT CHANGED    Details   atorvastatin (LIPITOR) 20 mg tablet Take 1 tablet (20 mg total) by mouth daily, Starting Mon 4/17/2023, Normal      Blood Glucose Monitoring Suppl (OneTouch Verio Reflect) w/Device KIT May substitute brand based on insurance coverage  Check glucose ACHS , Normal      calcium citrate (CALCITRATE) 950 MG tablet Take 1 tablet by mouth daily, Historical Med      cholecalciferol (VITAMIN D3) 1,000 units tablet Take 1,000 Units by mouth daily, Historical Med      cyanocobalamin 100 MCG tablet Take 100 mcg by mouth every other day , Historical Med      glucose blood (OneTouch Verio) test strip May substitute brand based on insurance coverage  Check glucose ACHS , Normal      insulin degludec Osman Washingtonter FlexTouch) 100 units/mL injection pen 10 units daily   Dose change, Normal      Insulin Pen Needle (BD Pen Needle Trudy 2nd Gen) 32G X 4 MM MISC For use with insulin pen  Pharmacy may dispense brand covered by insurance , Normal      metFORMIN (GLUCOPHAGE) 500 mg tablet 1 tab bid, Normal      metoprolol succinate (TOPROL-XL) 25 mg 24 hr tablet TAKE ONE TABLET BY MOUTH ONCE DAILY, Normal      multivitamin (THERAGRAN) TABS Take 1 tablet by mouth daily, Historical Med      OneTouch Delica Lancets 42K MISC May substitute brand based on insurance coverage   Check glucose ACHS , Normal                 PDMP Review     None          ED Provider  Electronically Signed by           Maeola Bosworth, MD  05/14/23 6442

## 2023-05-14 LAB
ATRIAL RATE: 96 BPM
P AXIS: 41 DEGREES
PR INTERVAL: 166 MS
QRS AXIS: 63 DEGREES
QRSD INTERVAL: 86 MS
QT INTERVAL: 362 MS
QTC INTERVAL: 457 MS
T WAVE AXIS: 71 DEGREES
VENTRICULAR RATE: 96 BPM

## 2023-05-15 ENCOUNTER — TELEPHONE (OUTPATIENT)
Dept: ENDOCRINOLOGY | Facility: CLINIC | Age: 63
End: 2023-05-15

## 2023-05-15 NOTE — TELEPHONE ENCOUNTER
Patient is concern with increasing Metformin since she is having other issue with swelling on her legs and feet  She was in the hospital this weekend and perhaps she is having heart issue and will be seeing cardiologist soon  At this time she does not want to change or increase any of her medications  She wanted to know what her readings should be fasting

## 2023-05-15 NOTE — TELEPHONE ENCOUNTER
Reviewed blood sugars  Overall doing well  Some variability it looks like depending on diet  Any side effects from metformin such as abdominal discomfort or diarrhea? If not, would suggest we consider increasing metformin to 1000 mg twice a day

## 2023-05-15 NOTE — TELEPHONE ENCOUNTER
Fasting goal is typically   We can leave metformin as is for now, though I do not suspect this is contributing to swelling

## 2023-05-16 NOTE — PROGRESS NOTES
Cardiology  Hospital Follow Up   Office Visit Note  Татьяна Trevizo   57 y o    female   MRN: [id number]  [address]  [address]  [address] [place], [place] [id number] [phone]    PCP: Ale Siegel MD  Cardiologist: Dr Abeba López                  Summary of recommendations  -DASH diet  She has been consuming excess sodium, crackers, cottage cheese  She is retaining fluid  I educated her how to read a food label to facilitate adherence   -Echocardiogram-scheduled  -Add lisinopril HCTZ 20/25 mg- one half tablet daily ( had been on hold tablet in the past)  -Hydrate-- 64 oz H20 a day  -Non Fasting BMP 1- 2 weeks  -Periodic home BP measurement  This especially important considering on adding back medication  needs to bring machine in for validation  -Stress echo given CP, recent ED visit  -Lipid profile with direct LDL 1mo  Recently initiated on a statin  -Aggressive CV risk factor modification, A1C is > 14  -Follow up will be scheduled with Dr Abeba López 6/21/23        Assessment/plan  Chest pain  Recent ED admission  Troponins negative  Her blood pressure was elevated  Some typical, some atypical symptoms  She has several CV risk factors  We will pursue a stress echocardiogram   I asked her to hold the beta-blocker the morning of the test  Will enhance therapy for her BP  She does have evidence of volume  Overload which may be contributing  PAF  • Very remote episode in the setting of severe obesity, without recurrence  Hypertension, essential   BP  145/89  Toprol 25 mg daily  We will add lisinopril/HCTZ 20/25 1/2 tablet daily  In the past she was taking a full tablet -Nonfasting BMP in a couple weeks  Encouraged hydration  Hyperlipidemia, recently started on atorvastatin 20 mg daily in April 2023 4/17/2023: Calculated   Heart healthy diet    Reassess   Latest Reference Range & Units 09/13/14 08:00 04/25/15 08:11 08/01/15 07:46 03/24/16 07:26 03/09/19 07:05 04/17/23 09:08   Cholesterol See Comment mg/dL 188 142 149 166 214 (H) 182   Triglycerides See Comment mg/dL 94 64 73 89 119 173 (H)   HDL >=50 mg/dL 61 50 59 50 58 38 (L)   Non-HDL Cholesterol mg/dl     156    LDL Calculated 0 - 100 mg/dL 108 (H) 79 75 98 132 (H) 109 (H)   T2DM  4/15/23: HgA1c > 14, now on insulin, metformin  Following with Endo  Obesity, Hx Gastric bypasss  BMI is 32  Regained weight  In 2021 was up to 240 lbs  Has lost, and is now 216 lb  Cardiac testing  • TTE will be ordered                  HPI   Anish Bond is a 57 y o  female with HTN, DM, obesity, status post Tramaine en Ygastric bypass years ago  After gastric bypass, she came off her oral antihyperglycemic agents  She has a history of PAF in the setting of obesity  She had no recurrence  She last saw Dr Bobby Salomon 3/2021 and she was noted to be regaining weight and was up to 240 pounds, status post gastric bypass  Her blood pressure was controlled  She was advised mechanisms to lose weight    Adm 4/15-4/17/23  CC: low BP, weakness, fatigue x 1 week  BS > 700, ANA, creatinine 1 5  followed by endo, started on insulin, IV fluids  She was previously on metformin but was stopped as her blood sugars improved following weight loss  She lost follow-up with her PCP given detention  Lisinopril/HCTZ stopped  EKG: Initial concern for pericarditis with ST elevation  However no clinical symptoms  Not followed by cardiology  Continued on Toprol  She had pseudohyponatremia which improved with improved glucose control    4/17/23  Hospital follow-up  She is accompanied by her   She was found to have marked hyperglycemia with an A1c greater than 14  She is now on insulin  She tells me since discharge, she is feeling a significant improvement as compared to prior  Her lightheadedness, dizziness, and extreme fatigue have all resolved  She does have some visual changes that are improving with time    She denies chest pain, or palpitations  She had no recurrence of A-fib  She was seen by a dietitian in the hospital   She is continuing on her journey of what are appropriate foods, given her prior gastric bypass, and now diabetes with hyperglycemia, requiring insulin  Blood pressure by me 128/84 on Toprol 25 mg daily  Since her last visit with Dr Taurus Girard, she is down to 211 pounds  For now lisinopril has been held  Given her diabetes she would likely benefit from an ACE inhibitor  For now, we will leave off until we have some additional blood pressure data    Interval history  ED 5/15/23  CC: intermittent  chest pain associated symptoms  Patient reported she felt it was likely musculoskeletal due to pain starting when she is trying to do something with her left hand or walking with something in the left hand  PE significant for LE edema  Work-up unremarkable including troponins x 2 and BNP  /97  DCd to OP F/U    5/17/23  ED Hospital follow-up  She is accompanied by her   Recently, she bred puppies  Seven were delivered, the morning that she went to the emergency room  Prior to the delivery, she was lying on her side to assess the puppies heart rates, etc  with an ultrasound machine  She was in awkward positions  She was bending over and doing a lot of physical work  With these activities she noted anterior chest heaviness, reccurent  This prompted her admission  Troponins  were negative; her blood pressure was elevated as above  Her EKG showed normal sinus rhythm with lateral ST depressions; this was reviewed personally  Shereen Jackson She was discharged to follow-up as an outpatient  Since discharge she has had some recurrence of chest heaviness with activities  Other times she has been able to exert herself without the chest heaviness  She does note she has had the development of lower extremity edema of recent  She did see a dietitian  She has been eating things like cottage cheese and some saltine crackers  Unfortunately, she has developed lower extremity edema as a result  I reviewed how to read food labels and asked her to try to avoid dietary sodium  Today, her blood pressure is 145/89  I will add back lisinopril/HCTZ 20/25 which she was on in the past   However I asked her to take only half tablet as she is now on metoprolol  We will also get labs in the near future  I asked her to monitor her blood pressure at home  I emphasized the importance of this  Given that she has busy with her puppies, she had not been checking it at home  I ordered a stress echocardiogram given her significant CV risk factors: Uncontrolled diabetes, mixed dyslipidemia, hypertension  She may have some chest discomfort related to volume  She gained 2 pounds recently          I have spent 25 minutes with Patient and family today in which greater than 50% of this time was spent in counseling/coordination of care regarding Instructions for management, Patient and family education, Importance of tx compliance, Risk factor reductions, Documenting in the medical record, Reviewing / ordering tests, medicine, procedures   and Obtaining or reviewing history    Assessment  Diagnoses and all orders for this visit:    Chest pain, unspecified type  -     Echo stress test, exercise; Future    Benign essential hypertension  -     lisinopril-hydrochlorothiazide (PRINZIDE,ZESTORETIC) 20-25 MG per tablet; Take 0 5 tablets by mouth daily  -     Basic metabolic panel; Future  -     Echo stress test, exercise; Future    Paroxysmal atrial fibrillation (HCC)    Mixed hyperlipidemia  -     Lipid Panel With Direct LDL; Future    Pure hypercholesterolemia  -     atorvastatin (LIPITOR) 20 mg tablet; Take 1 tablet (20 mg total) by mouth daily  -     Echo stress test, exercise;  Future    Class 1 obesity due to excess calories with body mass index (BMI) of 32 0 to 32 9 in adult, unspecified whether serious comorbidity present          Past Medical History: Diagnosis Date   • Diabetes mellitus ([facility] [address] ) 4 2023   • Hypertension    • Obesity     Had weight trell surgery       Review of Systems   Constitutional: Negative for chills  Cardiovascular: Positive for chest pain and leg swelling  Negative for claudication, cyanosis, dyspnea on exertion, irregular heartbeat, near-syncope, orthopnea, palpitations, paroxysmal nocturnal dyspnea and syncope  See HPI   Respiratory: Negative for cough and shortness of breath  Gastrointestinal: Negative for heartburn and nausea  Neurological: Negative for dizziness, focal weakness, headaches, light-headedness and weakness  All other systems reviewed and are negative  Allergies   Allergen Reactions   • Other Other (See Comments)     No Nsaids due to weight loss surgery        Current Outpatient Medications:   •  atorvastatin (LIPITOR) 20 mg tablet, Take 1 tablet (20 mg total) by mouth daily, Disp: 90 tablet, Rfl: 1  •  Blood Glucose Monitoring Suppl (OneTouch Verio Reflect) w/Device KIT, May substitute brand based on insurance coverage  Check glucose ACHS  , Disp: 1 kit, Rfl: 0  •  calcium citrate (CALCITRATE) 950 MG tablet, Take 1 tablet by mouth daily, Disp: , Rfl:   •  cholecalciferol (VITAMIN D3) 1,000 units tablet, Take 1,000 Units by mouth daily, Disp: , Rfl:   •  cyanocobalamin 100 MCG tablet, Take 100 mcg by mouth every other day , Disp: , Rfl:   •  glucose blood (OneTouch Verio) test strip, May substitute brand based on insurance coverage  Check glucose ACHS  , Disp: 200 each, Rfl: 0  •  insulin degludec Gabriela Анна FlexTouch) 100 units/mL injection pen, 10 units daily  Dose change, Disp: 15 mL, Rfl: 0  •  Insulin Pen Needle (BD Pen Needle Trudy 2nd Gen) 32G X 4 MM MISC, For use with insulin pen   Pharmacy may dispense brand covered by insurance , Disp: 100 each, Rfl: 0  •  lisinopril-hydrochlorothiazide (PRINZIDE,ZESTORETIC) 20-25 MG per tablet, Take 0 5 tablets by mouth daily, Disp: , Rfl:   •  metFORMIN (GLUCOPHAGE) 500 mg tablet, 1 tab bid, Disp: 60 tablet, Rfl: 1  •  metoprolol succinate (TOPROL-XL) 25 mg 24 hr tablet, TAKE ONE TABLET BY MOUTH ONCE DAILY, Disp: 90 tablet, Rfl: 0  •  multivitamin (THERAGRAN) TABS, Take 1 tablet by mouth daily, Disp: , Rfl:   •  OneTouch Delica Lancets 18L MISC, May substitute brand based on insurance coverage  Check glucose ACHS  , Disp: 200 each, Rfl: 0        Social History     Socioeconomic History   • Marital status: /Civil Union     Spouse name: Not on file   • Number of children: Not on file   • Years of education: Not on file   • Highest education level: Not on file   Occupational History   • Not on file   Tobacco Use   • Smoking status: Former     Packs/day: 1 00     Years: 20 00     Pack years: 20 00     Types: Cigarettes   • Smokeless tobacco: Never   • Tobacco comments:     20 years ago     Vaping Use   • Vaping Use: Never used   Substance and Sexual Activity   • Alcohol use: Never   • Drug use: Never   • Sexual activity: Not Currently     Partners: Male     Birth control/protection: Post-menopausal   Other Topics Concern   • Not on file   Social History Narrative   • Not on file     Social Determinants of Health     Financial Resource Strain: Not on file   Food Insecurity: Not on file   Transportation Needs: Not on file   Physical Activity: Not on file   Stress: Not on file   Social Connections: Not on file   Intimate Partner Violence: Not on file   Housing Stability: Not on file       Family History   Problem Relation Age of Onset   • Arrhythmia Mother         atrial flutter   • Skin cancer Mother    • Hypertension Father    • Hyperlipidemia Father    • Atrial fibrillation Sister    • Atrial fibrillation Brother    • Other Brother         sarosis of liver   • Heart attack Paternal Uncle    • Breast cancer Maternal Grandmother    • Throat cancer Maternal Grandfather    • Stroke Neg Hx    • Anuerysm Neg Hx    • Clotting disorder Neg Hx    • Heart failure Neg Hx "  • Coronary artery disease Neg Hx        Physical Exam  Vitals and nursing note reviewed  Constitutional:       General: She is not in acute distress  Appearance: She is obese  She is not diaphoretic  HENT:      Head: Normocephalic and atraumatic  Eyes:      Conjunctiva/sclera: Conjunctivae normal    Cardiovascular:      Rate and Rhythm: Normal rate and regular rhythm  Pulses: Intact distal pulses  Heart sounds: Normal heart sounds  Pulmonary:      Effort: Pulmonary effort is normal       Breath sounds: Decreased breath sounds present  Abdominal:      General: Bowel sounds are normal       Palpations: Abdomen is soft  Musculoskeletal:         General: Normal range of motion  Cervical back: Normal range of motion and neck supple  Right lower leg: Edema present  Left lower leg: Edema present  Skin:     General: Skin is warm and dry  Neurological:      Mental Status: She is alert and oriented to person, place, and time  Vitals: Blood pressure 145/89, pulse 93, height 5' 8\" (1 727 m), weight 98 3 kg (216 lb 12 8 oz), SpO2 98 %  Wt Readings from Last 3 Encounters:   05/17/23 98 3 kg (216 lb 12 8 oz)   04/28/23 99 1 kg (218 lb 6 4 oz)   04/19/23 97 1 kg (214 lb)         Labs & Results:  Lab Results   Component Value Date    WBC 7 45 05/13/2023    HGB 10 9 (L) 05/13/2023    HCT 34 8 05/13/2023    MCV 94 05/13/2023     05/13/2023     BNP   Date Value Ref Range Status   05/13/2023 68 0 - 100 pg/mL Final   04/15/2023 24 0 - 100 pg/mL Final     No components found for: CHEM  No results found for: [place], TROPONINT, CKMBINDEX  No results found for this or any previous visit  No results found for this or any previous visit  This note was completed in part utilizing mOne [employer]ja fluency direct voice recognition software     Grammatical errors, random word insertion, spelling mistakes, and incomplete sentences may be an occasional consequence of the " system secondary to software limitations, ambient noise and hardware issues  At the time of dictation, efforts were made to edit, clarify and /or correct errors  Please read the chart carefully and recognize, using context, where substitutions have occurred    If you have any questions or concerns about the context, text or information contained within the body of this dictation, please contact myself, the provider, for further clarification

## 2023-05-17 ENCOUNTER — OFFICE VISIT (OUTPATIENT)
Dept: CARDIOLOGY CLINIC | Facility: CLINIC | Age: 63
End: 2023-05-17

## 2023-05-17 VITALS
BODY MASS INDEX: 32.86 KG/M2 | SYSTOLIC BLOOD PRESSURE: 145 MMHG | WEIGHT: 216.8 LBS | HEART RATE: 93 BPM | OXYGEN SATURATION: 98 % | HEIGHT: 68 IN | DIASTOLIC BLOOD PRESSURE: 89 MMHG

## 2023-05-17 DIAGNOSIS — E78.2 MIXED HYPERLIPIDEMIA: ICD-10-CM

## 2023-05-17 DIAGNOSIS — E66.09 CLASS 1 OBESITY DUE TO EXCESS CALORIES WITH BODY MASS INDEX (BMI) OF 32.0 TO 32.9 IN ADULT, UNSPECIFIED WHETHER SERIOUS COMORBIDITY PRESENT: ICD-10-CM

## 2023-05-17 DIAGNOSIS — E78.00 PURE HYPERCHOLESTEROLEMIA: ICD-10-CM

## 2023-05-17 DIAGNOSIS — R07.9 CHEST PAIN, UNSPECIFIED TYPE: Primary | ICD-10-CM

## 2023-05-17 DIAGNOSIS — I10 BENIGN ESSENTIAL HYPERTENSION: ICD-10-CM

## 2023-05-17 DIAGNOSIS — I48.0 PAROXYSMAL ATRIAL FIBRILLATION (HCC): ICD-10-CM

## 2023-05-17 RX ORDER — LISINOPRIL AND HYDROCHLOROTHIAZIDE 25; 20 MG/1; MG/1
0.5 TABLET ORAL DAILY
Start: 2023-05-17

## 2023-05-17 RX ORDER — ATORVASTATIN CALCIUM 20 MG/1
20 TABLET, FILM COATED ORAL DAILY
Qty: 90 TABLET | Refills: 1 | Status: SHIPPED | OUTPATIENT
Start: 2023-05-17

## 2023-05-17 NOTE — PATIENT INSTRUCTIONS

## 2023-05-25 ENCOUNTER — HOSPITAL ENCOUNTER (OUTPATIENT)
Dept: NON INVASIVE DIAGNOSTICS | Facility: MEDICAL CENTER | Age: 63
Discharge: HOME | End: 2023-05-25

## 2023-05-25 VITALS
SYSTOLIC BLOOD PRESSURE: 145 MMHG | BODY MASS INDEX: 32.74 KG/M2 | HEIGHT: 68 IN | HEART RATE: 89 BPM | WEIGHT: 216 LBS | DIASTOLIC BLOOD PRESSURE: 89 MMHG

## 2023-05-25 DIAGNOSIS — I48.0 PAROXYSMAL ATRIAL FIBRILLATION (HCC): ICD-10-CM

## 2023-05-25 DIAGNOSIS — I10 BENIGN ESSENTIAL HYPERTENSION: ICD-10-CM

## 2023-05-25 LAB
AORTIC ROOT: 3.3 CM
AORTIC VALVE MEAN VELOCITY: 12.4 M/S
APICAL FOUR CHAMBER EJECTION FRACTION: 60 %
ASCENDING AORTA: 3.7 CM
AV LVOT MEAN GRADIENT: 2 MMHG
AV LVOT PEAK GRADIENT: 4 MMHG
AV MEAN GRADIENT: 7 MMHG
AV PEAK GRADIENT: 12 MMHG
AV VELOCITY RATIO: 0.57
DOP CALC AO PEAK VEL: 1.76 M/S
DOP CALC AO VTI: 38.38 CM
DOP CALC LVOT PEAK VEL VTI: 21.87 CM
DOP CALC LVOT PEAK VEL: 1 M/S
E WAVE DECELERATION TIME: 203 MS
FRACTIONAL SHORTENING: 37 (ref 28–44)
INTERVENTRICULAR SEPTUM IN DIASTOLE (PARASTERNAL SHORT AXIS VIEW): 1.1 CM
INTERVENTRICULAR SEPTUM: 1.1 CM (ref 0.6–1.1)
LAAS-AP2: 19.5 CM2
LAAS-AP4: 21.8 CM2
LEFT ATRIUM SIZE: 3.6 CM
LEFT INTERNAL DIMENSION IN SYSTOLE: 2.9 CM (ref 2.1–4)
LEFT VENTRICULAR INTERNAL DIMENSION IN DIASTOLE: 4.6 CM (ref 3.5–6)
LEFT VENTRICULAR POSTERIOR WALL IN END DIASTOLE: 0.9 CM
LEFT VENTRICULAR STROKE VOLUME: 63 ML
LVSV (TEICH): 63 ML
MV E'TISSUE VEL-SEP: 11 CM/S
MV PEAK A VEL: 0.71 M/S
MV PEAK E VEL: 82 CM/S
MV STENOSIS PRESSURE HALF TIME: 59 MS
MV VALVE AREA P 1/2 METHOD: 3.73
RIGHT ATRIUM AREA SYSTOLE A4C: 12.2 CM2
RIGHT VENTRICLE ID DIMENSION: 3.2 CM
SL CV LEFT ATRIUM LENGTH A2C: 5.2 CM
SL CV LV EF: 60
SL CV PED ECHO LEFT VENTRICLE DIASTOLIC VOLUME (MOD BIPLANE) 2D: 96 ML
SL CV PED ECHO LEFT VENTRICLE SYSTOLIC VOLUME (MOD BIPLANE) 2D: 32 ML
TR MAX PG: 6 MMHG
TR PEAK VELOCITY: 1.2 M/S
TRICUSPID ANNULAR PLANE SYSTOLIC EXCURSION: 2.7 CM
TRICUSPID VALVE PEAK REGURGITATION VELOCITY: 1.2 M/S

## 2023-05-30 DIAGNOSIS — I42.9 CARDIOMYOPATHY (HCC): ICD-10-CM

## 2023-05-30 RX ORDER — METOPROLOL SUCCINATE 25 MG/1
TABLET, EXTENDED RELEASE ORAL
Qty: 90 TABLET | Refills: 0 | Status: SHIPPED | OUTPATIENT
Start: 2023-05-30

## 2023-06-02 ENCOUNTER — HOSPITAL ENCOUNTER (OUTPATIENT)
Dept: NON INVASIVE DIAGNOSTICS | Facility: HOSPITAL | Age: 63
Discharge: HOME | End: 2023-06-02

## 2023-06-02 ENCOUNTER — TELEPHONE (OUTPATIENT)
Dept: CARDIOLOGY CLINIC | Facility: CLINIC | Age: 63
End: 2023-06-02

## 2023-06-02 ENCOUNTER — PREP FOR PROCEDURE (OUTPATIENT)
Dept: CARDIOLOGY CLINIC | Facility: CLINIC | Age: 63
End: 2023-06-02

## 2023-06-02 VITALS — WEIGHT: 216 LBS | HEIGHT: 68 IN | BODY MASS INDEX: 32.74 KG/M2

## 2023-06-02 DIAGNOSIS — R94.39 POSITIVE CARDIAC STRESS TEST: Primary | ICD-10-CM

## 2023-06-02 DIAGNOSIS — R07.9 CHEST PAIN, UNSPECIFIED TYPE: ICD-10-CM

## 2023-06-02 DIAGNOSIS — E78.00 PURE HYPERCHOLESTEROLEMIA: ICD-10-CM

## 2023-06-02 DIAGNOSIS — I10 BENIGN ESSENTIAL HYPERTENSION: ICD-10-CM

## 2023-06-02 DIAGNOSIS — I25.118 CORONARY ARTERY DISEASE OF NATIVE ARTERY OF NATIVE HEART WITH STABLE ANGINA PECTORIS (HCC): Primary | ICD-10-CM

## 2023-06-02 DIAGNOSIS — R94.39 POSITIVE CARDIAC STRESS TEST: ICD-10-CM

## 2023-06-02 LAB
CHEST PAIN STATEMENT: NORMAL
MAX DIASTOLIC BP: 90 MMHG
MAX HEART RATE: 141 BPM
MAX HR PERCENT: 90 %
MAX HR: 141 BPM
MAX PREDICTED HEART RATE: 156 BPM
MAX. SYSTOLIC BP: 150 MMHG
PROTOCOL NAME: NORMAL
RATE PRESSURE PRODUCT: NORMAL
REASON FOR TERMINATION: NORMAL
SL CV LV EF: 60
SL CV STRESS RECOVERY BP: NORMAL MMHG
SL CV STRESS RECOVERY HR: 105 BPM
SL CV STRESS RECOVERY O2 SAT: 98 %
SL CV STRESS STAGE REACHED: 2
STRESS ANGINA INDEX: 1
STRESS BASELINE BP: NORMAL MMHG
STRESS BASELINE HR: 90 BPM
STRESS O2 SAT REST: 98 %
STRESS PEAK HR: 141 BPM
STRESS POST ESTIMATED WORKLOAD: 7 METS
STRESS POST EXERCISE DUR MIN: 5 MIN
STRESS POST EXERCISE DUR SEC: 0 SEC
STRESS POST O2 SAT PEAK: 100 %
STRESS POST PEAK BP: 150 MMHG
TARGET HR FORMULA: NORMAL
TEST INDICATION: NORMAL
TIME IN EXERCISE PHASE: NORMAL

## 2023-06-02 RX ORDER — ASPIRIN 81 MG/1
81 TABLET, CHEWABLE ORAL DAILY
Qty: 90 TABLET | Refills: 1 | Status: SHIPPED | OUTPATIENT
Start: 2023-06-02

## 2023-06-02 NOTE — TELEPHONE ENCOUNTER
Per [facility] online precert resource list and [place], [id number] or [id number], do not require authorization  Per EPIC RTE registration, this patient has outpatient benefits  These are valid and billable codes

## 2023-06-02 NOTE — PROGRESS NOTES
Stress rest results were reviewed with patient &   She is currently pain free  Discussed with cath lab team and cardiac cath will be unable to be performed today due to limited bed availability  She was scheduled for outpatient cardiac cath on Monday, 6/5/23  They were counseled on symptoms that should prompt immediate medical evaluation by EMS or in the emergency department  She will continue metoprolol and atorvastatin  She will start aspirin 81 mg today

## 2023-06-02 NOTE — TELEPHONE ENCOUNTER
Per Nimco Burgos phone call,  Patient referred to have a cardiac Cath due to abnormal stress test, she wants this patient to be set up for Monday 6/5/2023 at [facility] to be done by Dr Jennifer Gonzáles can you please see if patient insurance will approved service  Earline Gavin, are you able to place the pre for procedure for this case since you evaluate this patient on 5/17/2023      Thank you

## 2023-06-05 ENCOUNTER — HOSPITAL ENCOUNTER (OUTPATIENT)
Facility: HOSPITAL | Age: 63
Setting detail: OUTPATIENT SURGERY
Discharge: HOME | End: 2023-06-05
Attending: INTERNAL MEDICINE | Admitting: INTERNAL MEDICINE
Payer: COMMERCIAL

## 2023-06-05 VITALS
WEIGHT: 204 LBS | SYSTOLIC BLOOD PRESSURE: 100 MMHG | HEART RATE: 69 BPM | OXYGEN SATURATION: 100 % | RESPIRATION RATE: 18 BRPM | TEMPERATURE: 97.4 F | BODY MASS INDEX: 30.92 KG/M2 | DIASTOLIC BLOOD PRESSURE: 55 MMHG | HEIGHT: 68 IN

## 2023-06-05 DIAGNOSIS — R94.39 POSITIVE CARDIAC STRESS TEST: ICD-10-CM

## 2023-06-05 DIAGNOSIS — E78.00 PURE HYPERCHOLESTEROLEMIA: ICD-10-CM

## 2023-06-05 DIAGNOSIS — I25.118 CORONARY ARTERY DISEASE OF NATIVE ARTERY OF NATIVE HEART WITH STABLE ANGINA PECTORIS (HCC): ICD-10-CM

## 2023-06-05 DIAGNOSIS — I10 BENIGN ESSENTIAL HYPERTENSION: ICD-10-CM

## 2023-06-05 DIAGNOSIS — E11.65 TYPE 2 DIABETES MELLITUS WITH HYPERGLYCEMIA, WITHOUT LONG-TERM CURRENT USE OF INSULIN (HCC): ICD-10-CM

## 2023-06-05 DIAGNOSIS — Z95.5 STATUS POST INSERTION OF DRUG ELUTING CORONARY ARTERY STENT: Primary | ICD-10-CM

## 2023-06-05 PROBLEM — I25.10 CORONARY ARTERY DISEASE INVOLVING NATIVE CORONARY ARTERY: Status: ACTIVE | Noted: 2023-06-05

## 2023-06-05 LAB
ANION GAP SERPL CALCULATED.3IONS-SCNC: 2 MMOL/L (ref 4–13)
ATRIAL RATE: 98 BPM
BUN SERPL-MCNC: 38 MG/DL (ref 5–25)
CALCIUM SERPL-MCNC: 9.5 MG/DL (ref 8.3–10.1)
CHLORIDE SERPL-SCNC: 109 MMOL/L (ref 96–108)
CHOLEST SERPL-MCNC: 143 MG/DL
CO2 SERPL-SCNC: 25 MMOL/L (ref 21–32)
CREAT SERPL-MCNC: 1.01 MG/DL (ref 0.6–1.3)
EST. AVERAGE GLUCOSE BLD GHB EST-MCNC: 240 MG/DL
GFR SERPL CREATININE-BSD FRML MDRD: 58 ML/MIN/1.73SQ M
GLUCOSE P FAST SERPL-MCNC: 175 MG/DL (ref 65–99)
GLUCOSE SERPL-MCNC: 175 MG/DL (ref 65–140)
HBA1C MFR BLD: 10 %
HDLC SERPL-MCNC: 49 MG/DL
KCT BLD-ACNC: 327 SEC (ref 89–137)
LDLC SERPL CALC-MCNC: 79 MG/DL (ref 0–100)
LDLC SERPL DIRECT ASSAY-MCNC: 77 MG/DL (ref 0–100)
NONHDLC SERPL-MCNC: 94 MG/DL
P AXIS: 25 DEGREES
POTASSIUM SERPL-SCNC: 4.1 MMOL/L (ref 3.5–5.3)
PR INTERVAL: 168 MS
QRS AXIS: 18 DEGREES
QRSD INTERVAL: 96 MS
QT INTERVAL: 374 MS
QTC INTERVAL: 477 MS
SODIUM SERPL-SCNC: 136 MMOL/L (ref 135–147)
SPECIMEN SOURCE: ABNORMAL
T WAVE AXIS: 30 DEGREES
TRIGL SERPL-MCNC: 74 MG/DL
VENTRICULAR RATE: 98 BPM

## 2023-06-05 PROCEDURE — 85347 COAGULATION TIME ACTIVATED: CPT

## 2023-06-05 PROCEDURE — 83721 ASSAY OF BLOOD LIPOPROTEIN: CPT | Performed by: STUDENT IN AN ORGANIZED HEALTH CARE EDUCATION/TRAINING PROGRAM

## 2023-06-05 PROCEDURE — 99153 MOD SED SAME PHYS/QHP EA: CPT | Performed by: INTERNAL MEDICINE

## 2023-06-05 PROCEDURE — 80048 BASIC METABOLIC PNL TOTAL CA: CPT

## 2023-06-05 PROCEDURE — C1887 CATHETER, GUIDING: HCPCS | Performed by: INTERNAL MEDICINE

## 2023-06-05 PROCEDURE — 99152 MOD SED SAME PHYS/QHP 5/>YRS: CPT | Performed by: INTERNAL MEDICINE

## 2023-06-05 PROCEDURE — NC001 PR NO CHARGE

## 2023-06-05 PROCEDURE — 92928 PRQ TCAT PLMT NTRAC ST 1 LES: CPT | Performed by: INTERNAL MEDICINE

## 2023-06-05 PROCEDURE — NC001 PR NO CHARGE: Performed by: INTERNAL MEDICINE

## 2023-06-05 PROCEDURE — C1894 INTRO/SHEATH, NON-LASER: HCPCS | Performed by: INTERNAL MEDICINE

## 2023-06-05 PROCEDURE — 93010 ELECTROCARDIOGRAM REPORT: CPT | Performed by: INTERNAL MEDICINE

## 2023-06-05 PROCEDURE — 93005 ELECTROCARDIOGRAM TRACING: CPT

## 2023-06-05 PROCEDURE — C1769 GUIDE WIRE: HCPCS | Performed by: INTERNAL MEDICINE

## 2023-06-05 PROCEDURE — 80061 LIPID PANEL: CPT | Performed by: STUDENT IN AN ORGANIZED HEALTH CARE EDUCATION/TRAINING PROGRAM

## 2023-06-05 PROCEDURE — 93454 CORONARY ARTERY ANGIO S&I: CPT | Performed by: INTERNAL MEDICINE

## 2023-06-05 PROCEDURE — C1725 CATH, TRANSLUMIN NON-LASER: HCPCS | Performed by: INTERNAL MEDICINE

## 2023-06-05 PROCEDURE — C9600 PERC DRUG-EL COR STENT SING: HCPCS | Performed by: INTERNAL MEDICINE

## 2023-06-05 PROCEDURE — 83036 HEMOGLOBIN GLYCOSYLATED A1C: CPT | Performed by: STUDENT IN AN ORGANIZED HEALTH CARE EDUCATION/TRAINING PROGRAM

## 2023-06-05 PROCEDURE — C1874 STENT, COATED/COV W/DEL SYS: HCPCS | Performed by: INTERNAL MEDICINE

## 2023-06-05 DEVICE — STENT ONYXNG30026UX ONYX 3.00X26RX
Type: IMPLANTABLE DEVICE | Site: CORONARY | Status: FUNCTIONAL
Brand: ONYX FRONTIER™

## 2023-06-05 RX ORDER — MIDAZOLAM HYDROCHLORIDE 2 MG/2ML
INJECTION, SOLUTION INTRAMUSCULAR; INTRAVENOUS CODE/TRAUMA/SEDATION MEDICATION
Status: DISCONTINUED | OUTPATIENT
Start: 2023-06-05 | End: 2023-06-05 | Stop reason: HOSPADM

## 2023-06-05 RX ORDER — CLOPIDOGREL BISULFATE 75 MG/1
75 TABLET ORAL DAILY
Qty: 90 TABLET | Refills: 4 | Status: SHIPPED | OUTPATIENT
Start: 2023-06-05

## 2023-06-05 RX ORDER — ATORVASTATIN CALCIUM 40 MG/1
40 TABLET, FILM COATED ORAL DAILY
Qty: 90 TABLET | Refills: 3 | Status: SHIPPED | OUTPATIENT
Start: 2023-06-05

## 2023-06-05 RX ORDER — FENTANYL CITRATE 50 UG/ML
INJECTION, SOLUTION INTRAMUSCULAR; INTRAVENOUS CODE/TRAUMA/SEDATION MEDICATION
Status: DISCONTINUED | OUTPATIENT
Start: 2023-06-05 | End: 2023-06-05 | Stop reason: HOSPADM

## 2023-06-05 RX ORDER — ONDANSETRON 2 MG/ML
4 INJECTION INTRAMUSCULAR; INTRAVENOUS EVERY 8 HOURS PRN
Status: DISCONTINUED | OUTPATIENT
Start: 2023-06-05 | End: 2023-06-05 | Stop reason: HOSPADM

## 2023-06-05 RX ORDER — ACETAMINOPHEN 325 MG/1
650 TABLET ORAL EVERY 6 HOURS PRN
Status: DISCONTINUED | OUTPATIENT
Start: 2023-06-05 | End: 2023-06-05 | Stop reason: HOSPADM

## 2023-06-05 RX ORDER — ASPIRIN 81 MG/1
TABLET, CHEWABLE ORAL
Status: COMPLETED
Start: 2023-06-05 | End: 2023-06-05

## 2023-06-05 RX ORDER — NITROGLYCERIN 20 MG/100ML
INJECTION INTRAVENOUS CODE/TRAUMA/SEDATION MEDICATION
Status: DISCONTINUED | OUTPATIENT
Start: 2023-06-05 | End: 2023-06-05 | Stop reason: HOSPADM

## 2023-06-05 RX ORDER — ASPIRIN 81 MG/1
324 TABLET, CHEWABLE ORAL ONCE
Status: COMPLETED | OUTPATIENT
Start: 2023-06-05 | End: 2023-06-05

## 2023-06-05 RX ORDER — LISINOPRIL AND HYDROCHLOROTHIAZIDE 25; 20 MG/1; MG/1
0.5 TABLET ORAL DAILY
Refills: 0
Start: 2023-06-06

## 2023-06-05 RX ORDER — LIDOCAINE HYDROCHLORIDE 10 MG/ML
INJECTION, SOLUTION EPIDURAL; INFILTRATION; INTRACAUDAL; PERINEURAL CODE/TRAUMA/SEDATION MEDICATION
Status: DISCONTINUED | OUTPATIENT
Start: 2023-06-05 | End: 2023-06-05 | Stop reason: HOSPADM

## 2023-06-05 RX ORDER — HEPARIN SODIUM 1000 [USP'U]/ML
INJECTION, SOLUTION INTRAVENOUS; SUBCUTANEOUS CODE/TRAUMA/SEDATION MEDICATION
Status: DISCONTINUED | OUTPATIENT
Start: 2023-06-05 | End: 2023-06-05 | Stop reason: HOSPADM

## 2023-06-05 RX ORDER — SODIUM CHLORIDE 9 MG/ML
50 INJECTION, SOLUTION INTRAVENOUS CONTINUOUS
Status: DISPENSED | OUTPATIENT
Start: 2023-06-05 | End: 2023-06-05

## 2023-06-05 RX ORDER — PRASUGREL 10 MG/1
TABLET, FILM COATED ORAL CODE/TRAUMA/SEDATION MEDICATION
Status: DISCONTINUED | OUTPATIENT
Start: 2023-06-05 | End: 2023-06-05 | Stop reason: HOSPADM

## 2023-06-05 RX ORDER — NITROGLYCERIN 0.4 MG/1
0.4 TABLET SUBLINGUAL
Status: DISCONTINUED | OUTPATIENT
Start: 2023-06-05 | End: 2023-06-05 | Stop reason: HOSPADM

## 2023-06-05 RX ORDER — SODIUM CHLORIDE 9 MG/ML
125 INJECTION, SOLUTION INTRAVENOUS CONTINUOUS
Status: DISCONTINUED | OUTPATIENT
Start: 2023-06-05 | End: 2023-06-05

## 2023-06-05 RX ORDER — VERAPAMIL HYDROCHLORIDE 2.5 MG/ML
INJECTION, SOLUTION INTRAVENOUS CODE/TRAUMA/SEDATION MEDICATION
Status: DISCONTINUED | OUTPATIENT
Start: 2023-06-05 | End: 2023-06-05 | Stop reason: HOSPADM

## 2023-06-05 RX ADMIN — ASPIRIN 81 MG CHEWABLE TABLET 243 MG: 81 TABLET CHEWABLE at 08:29

## 2023-06-05 RX ADMIN — ASPIRIN 243 MG: 81 TABLET, CHEWABLE ORAL at 08:29

## 2023-06-05 RX ADMIN — SODIUM CHLORIDE 125 ML/HR: 0.9 INJECTION, SOLUTION INTRAVENOUS at 08:30

## 2023-06-05 NOTE — H&P
"H&P Exam - Cardiology   Justine Bejarano 57 y o  female MRN: [id number]  Unit/Bed#: BE CATH LAB ROOM Encounter: [id number]     Office cardiologist Dr Fortino Duque    PCP: Jennie Valerio, DO        Assessment/Plan    Chest pain with typical and atypical features  -Abnormal echo stress test showing ST changes in II, III, AVF and hypokinesis of Severe hypokinesis of the mid anteroseptal, mid inferoseptal, apical septal, apical anterior    DM, type 2  -Hgb A1C  >14    Hyperlipidemia   -Cholesterol 182, , HDL 38 and   -Atorvastatin 20 mg daily     Hypertension  -Controlled  -lisinopril-HCTZ 20-25 mg daily     Plan:  Left heart cath     /73   Pulse 97   Temp 97 9 °F (36 6 °C) (Temporal)   Resp 18   Ht 5' 8\" (1 727 m)   Wt 92 5 kg (204 lb)   SpO2 100%   BMI 31 02 kg/m²     History of Present Illness   HPI:  Justine Bejarano is a 57 y o  female who presents to [address] [place] [address] for outpatient elective cardiac catheterization for evaluation of atypical chest pain and abnormal echo stress test (Severe hypokinesis of the mid anteroseptal, mid inferoseptal, apical septal, apical anterior, apical lateral and apical inferior segments and ST depressions II, III, AVF)  Left sided chest pain occurs with or without exertion, with radiation to left arm, not associated with shortness of breath, N/V or palpitations   Chest pain is at times provoked by position changes and laying on left side  Denies recent history of viral illness  Past medical history is significant for hypertension, remote history of paroxysmal atrial fibrillation (in the setting of obesity, without recurrence, not anticoagulated), hyperlipidemia, Type 2 DM, Obesity )BMI 31 02 kg/m2 and status-post gastric bypass  No history of cigarette smoking, ETOH abuse or illicit drug abuse  Family History: Maternal uncle MI in 66's               Historical Information   Past Medical History:   Diagnosis Date   • Diabetes mellitus ([facility] [address] ) 4 "    • Hypertension    • Obesity     Had weight trell surgery     Past Surgical History:   Procedure Laterality Date   • BARIATRIC SURGERY  2015   •  SECTION      32 years ago (2019)     Social History   Social History     Substance and Sexual Activity   Alcohol Use Never     Social History     Substance and Sexual Activity   Drug Use Never     Social History     Tobacco Use   Smoking Status Former   • Packs/day: 1 00   • Years: 20    • Total pack years:    • Types: Cigarettes   Smokeless Tobacco Never   Tobacco Comments    20 years ago  Family History:   Family History   Problem Relation Age of Onset   • Arrhythmia Mother         atrial flutter   • Skin cancer Mother    • Hypertension Father    • Hyperlipidemia Father    • Atrial fibrillation Sister    • Atrial fibrillation Brother    • Other Brother         sarosis of liver   • Heart attack Paternal Uncle    • Breast cancer Maternal Grandmother    • Throat cancer Maternal Grandfather    • Stroke Neg Hx    • Anuerysm Neg Hx    • Clotting disorder Neg Hx    • Heart failure Neg Hx    • Coronary artery disease Neg Hx        Meds/Allergies   all medications and allergies reviewed  Allergies   Allergen Reactions   • Other Other (See Comments)     No Nsaids due to weight loss surgery        Review of Systems   Constitutional: Negative  Negative for chills and fever  HENT: Negative  Negative for congestion  Eyes: Negative  Respiratory: Negative  Negative for chest tightness, shortness of breath and wheezing  Cardiovascular: Negative for chest pain, palpitations and leg swelling  Gastrointestinal: Negative  Negative for abdominal pain  Endocrine: Negative  Genitourinary: Negative  Musculoskeletal: Negative  Allergic/Immunologic: Negative  Neurological: Negative for weakness and light-headedness  Hematological: Negative  Psychiatric/Behavioral: Negative          Physical Exam  Constitutional:       General: She is not in acute distress  Appearance: Normal appearance  She is obese  She is not ill-appearing  HENT:      Head: Normocephalic  Nose: Nose normal    Eyes:      Conjunctiva/sclera: Conjunctivae normal    Cardiovascular:      Rate and Rhythm: Normal rate and regular rhythm  Pulses: Normal pulses  Heart sounds: Normal heart sounds  No murmur heard  No gallop  Pulmonary:      Effort: Pulmonary effort is normal       Breath sounds: Normal breath sounds  No rhonchi or rales  Chest:      Chest wall: No tenderness  Abdominal:      General: Bowel sounds are normal  There is no distension  Palpations: Abdomen is soft  Musculoskeletal:      Cervical back: Neck supple  Right lower leg: No edema  Left lower leg: No edema  Skin:     General: Skin is warm  Capillary Refill: Capillary refill takes less than 2 seconds  Neurological:      Mental Status: She is alert and oriented to person, place, and time  Psychiatric:         Mood and Affect: Mood normal          Behavior: Behavior normal          Thought Content: Thought content normal          Judgment: Judgment normal          EKG: NSR, rate 98 BPM  No St changes noted

## 2023-06-05 NOTE — DISCHARGE SUMMARY
"Discharge Summary - Kaylah Sánchez 57 y o  female MRN: [id number]    Unit/Bed#: BE CATH LAB ROOM Encounter: [id number]    Admission Date: 6/5/2023   Discharge Date: 6/5/2023    Disposition: Home    Condition at Discharge: good     [address] Dario, DO  OP Cardiologist:  Dr Staci Aleman     Interventional cardiologist: Dr Bobbi Mcmillan     Admitting Diagnosis:  Chest pain   -Abnormal NM stress test       Secondary Diagnoses:   Hypertension  HLD  DM2  -HgbA1c 10 0  Paroxsymal Atrial fibrillation  -remote history/not anticoagulated  Obesity  -s/p gastric bypass  -BMI 31 02 kg/m2        Discharge Diagnosis: Coronary artery disease, s/p successful PCI and placement of an [place] 3 X 26 mm REBECA to mid LAD  /55   Pulse 69   Temp (!) 97 4 °F (36 3 °C) (Temporal)   Resp 18   Ht 5' 8\" (1 727 m)   Wt 92 5 kg (204 lb)   SpO2 100%   BMI 31 02 kg/m²       Review of Systems   Constitutional: Negative  HENT: Negative  Negative for congestion  Eyes: Negative  Respiratory: Negative  Negative for chest tightness  Cardiovascular: Negative  Negative for chest pain, palpitations and leg swelling  Gastrointestinal: Negative  Negative for abdominal pain  Endocrine: Negative  Genitourinary: Negative  Negative for difficulty urinating  Musculoskeletal: Negative  Skin: Negative  Allergic/Immunologic: Negative  Neurological: Negative  Negative for dizziness and weakness  Hematological: Negative  Psychiatric/Behavioral: Negative  Physical Exam  Constitutional:       General: She is not in acute distress  Appearance: Normal appearance  She is obese  HENT:      Head: Normocephalic  Nose: Nose normal       Mouth/Throat:      Mouth: Mucous membranes are moist    Eyes:      Conjunctiva/sclera: Conjunctivae normal    Cardiovascular:      Rate and Rhythm: Normal rate and regular rhythm  Pulses: Normal pulses  Heart sounds: Normal heart sounds  No murmur heard       No " friction rub  No gallop  Pulmonary:      Effort: Pulmonary effort is normal       Breath sounds: Normal breath sounds  No rhonchi or rales  Abdominal:      General: Bowel sounds are normal       Palpations: Abdomen is soft  Musculoskeletal:         General: Normal range of motion  Cervical back: Neck supple  Right lower leg: No edema  Left lower leg: No edema  Skin:     General: Skin is warm  Capillary Refill: Capillary refill takes less than 2 seconds  Neurological:      Mental Status: She is alert and oriented to person, place, and time  Psychiatric:         Mood and Affect: Mood normal          Behavior: Behavior normal          Thought Content: Thought content normal          Judgment: Judgment normal              HPI and Hospital Course:  Lulú Washington, a 57year old female, presented to [place] [address] for outpatient elective cardiac catheterization for evaluation of chest pain and abnormal  Echo stress test       NM stress test performed on  6/2/2023 showed:  ST depressions in II, II, AVF  Severe hypokinesis of mid-anteroseptal, mid-inferoseptal, apical anterior, apical septal/lateral and apical inferior segments  Cardiac catheterization was performed via right radial artery  Prior to cardiac catheterization, she was loaded with prasugrel 60 mg and  mg  LM-minimal luminal irregularities  LAD-mild diffuse disease throughout  Proximal LAD 30% stenosis  Mid LAD 95% stenosis culprit for abnormal stress test    Left circumflex-minimal luminal irregularities  RCA mild diffuse disease throughout the vessel  Right PDA 45% stenosis  Status post successful PCI and placement of a [place] 3 x 26 mm drug-eluting stents to mid LAD lesion  Postintervention 0% residual stenosis  She met the criteria for same-day discharge and was discharged with the following instructions  Right radial site intact  No hematoma  No bleeding noted    Care reviewed with patient and   Discharge Plan:  1  Dual antiplatelet therapy with aspirin 81 mg daily and Plavix 75 mg daily for 1 year, then continue with monotherapy with Plavix 75 mg daily due to young age and residual disease  2   Aggressive risk factor modification  She is instructed to follow-up with endocrinologist for diabetes management  3   Add atorvastatin 40 mg daily  4   Cardiac rehab referral made  5  BMP and CBC tomorrow  6   Follow up appt with Dr Naman Guidry on 6/21/2023  Current Facility-Administered Medications   Medication Dose Route Frequency   • acetaminophen (TYLENOL) tablet 650 mg  650 mg Oral Q6H PRN   • nitroglycerin (NITROSTAT) SL tablet 0 4 mg  0 4 mg Sublingual Q5 Min PRN   • ondansetron (ZOFRAN) injection 4 mg  4 mg Intravenous Q8H PRN   • sodium chloride 0 9 % infusion  50 mL/hr Intravenous Continuous       Pertinent Labs/diagnostics:        Lab Ressults:  Recent Results (from the past 24 hour(s))   Basic metabolic panel    Collection Time: 06/05/23  8:28 AM   Result Value Ref Range    Sodium 136 135 - 147 mmol/L    Potassium 4 1 3 5 - 5 3 mmol/L    Chloride 109 (H) 96 - 108 mmol/L    CO2 25 21 - 32 mmol/L    ANION GAP 2 (L) 4 - 13 mmol/L    BUN 38 (H) 5 - 25 mg/dL    Creatinine 1 01 0 60 - 1 30 mg/dL    Glucose 175 (H) 65 - 140 mg/dL    Glucose, Fasting 175 (H) 65 - 99 mg/dL    Calcium 9 5 8 3 - 10 1 mg/dL    eGFR 58 ml/min/1 73sq m   Lipid panel    Collection Time: 06/05/23  8:28 AM   Result Value Ref Range    Cholesterol 143 See Comment mg/dL    Triglycerides 74 See Comment mg/dL    HDL, Direct 49 (L) >=50 mg/dL    LDL Calculated 79 0 - 100 mg/dL    Non-HDL-Chol (CHOL-HDL) 94 mg/dl   LDL cholesterol, direct    Collection Time: 06/05/23  8:28 AM   Result Value Ref Range    LDL Direct 77 0 - 100 mg/dl   Hemoglobin A1C w/ EAG Estimation    Collection Time: 06/05/23  8:28 AM   Result Value Ref Range    Hemoglobin A1C 10 0 (H) Normal 3 8-5 6%;  PreDiabetic 5 7-6 4%; Diabetic >=6 5%; Glycemic control for adults with diabetes <7 0% %     mg/dl           Lipid Profile:   Lab Results   Component Value Date    CHOL 149 08/01/2015    CHOL 142 04/25/2015    CHOL 188 09/13/2014     Lab Results   Component Value Date    HDL 49 (L) 06/05/2023    HDL 38 (L) 04/17/2023    HDL 58 03/09/2019     Lab Results   Component Value Date    LDLCALC 79 06/05/2023    LDLCALC 109 (H) 04/17/2023    LDLCALC 132 (H) 03/09/2019     Lab Results   Component Value Date    TRIG 74 06/05/2023    TRIG 173 (H) 04/17/2023    TRIG 119 03/09/2019         Cardiac testing:   No results found for this or any previous visit  No results found for this or any previous visit  No valid procedures specified  No results found for this or any previous visit  Tele:  NSR on monitor  No ectopy  Discharge instructions/Information to patient and family:   See after visit summary for information provided to patient and family  Provisions for Follow-Up Care:  See after visit summary for information related to follow-up care and any pertinent home health orders  Planned Readmission: No    Discharge Statement:  I spent 1 hour minutes discharging the patient  This time was spent on the day of discharge  I had direct contact with the patient on the day of discharge  Additional documentation is required if more than 30 minutes were spent on discharge  ** Please Note: Fluency Direct Dictation voice to text software may have been used in the creation of this document   **

## 2023-06-05 NOTE — DISCHARGE INSTR - AVS FIRST PAGE
1  Please see the post angioplasty discharge instructions  No heavy lifting, greater than 10 lbs  or strenuous activity for 5 days  Follow angioplasty discharge instructions  2 Remove band aid tomorrow  Shower and wash area- wrist gently with soap and water- beginning tomorrow  Rinse and pat dry  Apply new water seal band aid  Repeat this process for 5 days  No powders, creams lotions or antibiotic ointments  for 5 days  No tub baths, hot tubs or swimming for 5 days  3  [facility] ([phone]) if you develop a fever, redness or drainage at your wrist access site  4  No driving for 2 days    5  Do not stop aspirin or Plavix (clopiogrel) any reason without a cardiologist’s consent, or the stent could block up and cause a heart attack  6  Stent card and book

## 2023-06-06 ENCOUNTER — APPOINTMENT (OUTPATIENT)
Dept: LAB | Facility: MEDICAL CENTER | Age: 63
End: 2023-06-06
Payer: COMMERCIAL

## 2023-06-06 DIAGNOSIS — Z95.5 STATUS POST INSERTION OF DRUG ELUTING CORONARY ARTERY STENT: ICD-10-CM

## 2023-06-06 DIAGNOSIS — E78.2 MIXED HYPERLIPIDEMIA: ICD-10-CM

## 2023-06-06 DIAGNOSIS — I10 BENIGN ESSENTIAL HYPERTENSION: ICD-10-CM

## 2023-06-06 LAB
ANION GAP SERPL CALCULATED.3IONS-SCNC: 4 MMOL/L (ref 4–13)
BUN SERPL-MCNC: 28 MG/DL (ref 5–25)
CALCIUM SERPL-MCNC: 9.7 MG/DL (ref 8.3–10.1)
CHEST PAIN STATEMENT: NORMAL
CHLORIDE SERPL-SCNC: 108 MMOL/L (ref 96–108)
CHOLEST SERPL-MCNC: 148 MG/DL
CO2 SERPL-SCNC: 25 MMOL/L (ref 21–32)
CREAT SERPL-MCNC: 1.04 MG/DL (ref 0.6–1.3)
ERYTHROCYTE [DISTWIDTH] IN BLOOD BY AUTOMATED COUNT: 12.5 % (ref 11.6–15.1)
GFR SERPL CREATININE-BSD FRML MDRD: 56 ML/MIN/1.73SQ M
GLUCOSE P FAST SERPL-MCNC: 199 MG/DL (ref 65–99)
HCT VFR BLD AUTO: 34.2 % (ref 34.8–46.1)
HDLC SERPL-MCNC: 48 MG/DL
HGB BLD-MCNC: 11.2 G/DL (ref 11.5–15.4)
LDLC SERPL CALC-MCNC: 84 MG/DL (ref 0–100)
LDLC SERPL DIRECT ASSAY-MCNC: 77 MG/DL (ref 0–100)
MAX DIASTOLIC BP: 90 MMHG
MAX HEART RATE: 141 BPM
MAX PREDICTED HEART RATE: 156 BPM
MAX. SYSTOLIC BP: 150 MMHG
MCH RBC QN AUTO: 29.8 PG (ref 26.8–34.3)
MCHC RBC AUTO-ENTMCNC: 32.7 G/DL (ref 31.4–37.4)
MCV RBC AUTO: 91 FL (ref 82–98)
NONHDLC SERPL-MCNC: 100 MG/DL
PLATELET # BLD AUTO: 260 THOUSANDS/UL (ref 149–390)
PMV BLD AUTO: 10.7 FL (ref 8.9–12.7)
POTASSIUM SERPL-SCNC: 4.5 MMOL/L (ref 3.5–5.3)
PROTOCOL NAME: NORMAL
RBC # BLD AUTO: 3.76 MILLION/UL (ref 3.81–5.12)
REASON FOR TERMINATION: NORMAL
SODIUM SERPL-SCNC: 137 MMOL/L (ref 135–147)
TARGET HR FORMULA: NORMAL
TEST INDICATION: NORMAL
TIME IN EXERCISE PHASE: NORMAL
TRIGL SERPL-MCNC: 81 MG/DL
WBC # BLD AUTO: 5.72 THOUSAND/UL (ref 4.31–10.16)

## 2023-06-06 PROCEDURE — 80048 BASIC METABOLIC PNL TOTAL CA: CPT

## 2023-06-06 PROCEDURE — 36415 COLL VENOUS BLD VENIPUNCTURE: CPT

## 2023-06-06 PROCEDURE — 83721 ASSAY OF BLOOD LIPOPROTEIN: CPT

## 2023-06-06 PROCEDURE — 80061 LIPID PANEL: CPT

## 2023-06-06 PROCEDURE — 85027 COMPLETE CBC AUTOMATED: CPT

## 2023-06-07 DIAGNOSIS — E11.65 TYPE 2 DIABETES MELLITUS WITH HYPERGLYCEMIA, WITHOUT LONG-TERM CURRENT USE OF INSULIN (HCC): Primary | ICD-10-CM

## 2023-06-07 LAB
ATRIAL RATE: 85 BPM
P AXIS: 13 DEGREES
PR INTERVAL: 166 MS
QRS AXIS: 13 DEGREES
QRSD INTERVAL: 94 MS
QT INTERVAL: 396 MS
QTC INTERVAL: 471 MS
T WAVE AXIS: 31 DEGREES
VENTRICULAR RATE: 85 BPM

## 2023-06-07 PROCEDURE — 93010 ELECTROCARDIOGRAM REPORT: CPT | Performed by: INTERNAL MEDICINE

## 2023-06-15 ENCOUNTER — TELEPHONE (OUTPATIENT)
Dept: FAMILY MEDICINE CLINIC | Facility: MEDICAL CENTER | Age: 63
End: 2023-06-15

## 2023-06-15 DIAGNOSIS — R73.9 HYPERGLYCEMIA: ICD-10-CM

## 2023-06-15 DIAGNOSIS — E11.65 TYPE 2 DIABETES MELLITUS WITH HYPERGLYCEMIA, WITHOUT LONG-TERM CURRENT USE OF INSULIN (HCC): ICD-10-CM

## 2023-06-15 RX ORDER — BLOOD SUGAR DIAGNOSTIC
STRIP MISCELLANEOUS
Qty: 200 EACH | Refills: 0 | Status: SHIPPED | OUTPATIENT
Start: 2023-06-15

## 2023-06-15 RX ORDER — LANCETS 28 GAUGE
EACH MISCELLANEOUS
Qty: 300 EACH | Refills: 3 | Status: SHIPPED | OUTPATIENT
Start: 2023-06-15

## 2023-06-15 RX ORDER — LANCETS 33 GAUGE
EACH MISCELLANEOUS
Qty: 200 EACH | Refills: 0 | Status: SHIPPED | OUTPATIENT
Start: 2023-06-15

## 2023-06-15 RX ORDER — PEN NEEDLE, DIABETIC 32GX 5/32"
NEEDLE, DISPOSABLE MISCELLANEOUS
Qty: 300 EACH | Refills: 3 | Status: SHIPPED | OUTPATIENT
Start: 2023-06-15

## 2023-06-15 RX ORDER — BLOOD-GLUCOSE METER
KIT MISCELLANEOUS
Qty: 300 STRIP | Refills: 3 | Status: SHIPPED | OUTPATIENT
Start: 2023-06-15

## 2023-06-15 RX ORDER — INSULIN LISPRO 200 [IU]/ML
INJECTION, SOLUTION SUBCUTANEOUS
Qty: 6 ML | Refills: 0 | Status: SHIPPED | OUTPATIENT
Start: 2023-06-15 | End: 2023-06-21 | Stop reason: ALTCHOICE

## 2023-06-15 RX ORDER — PEN NEEDLE, DIABETIC 32GX 5/32"
NEEDLE, DISPOSABLE MISCELLANEOUS
Qty: 100 EACH | Refills: 0 | Status: SHIPPED | OUTPATIENT
Start: 2023-06-15 | End: 2023-06-15 | Stop reason: SDUPTHER

## 2023-06-15 NOTE — TELEPHONE ENCOUNTER
Routing to her endocrinologist   Bear Calabrese, may we also schedule transition of care appointment/future she has follow-up with her endocrinologist post recent hospitalization?

## 2023-06-15 NOTE — TELEPHONE ENCOUNTER
Requested medication(s) are due for refill today: Yes  Patient has already received a courtesy refill: No  Other reason request has been forwarded to provider:   Endocrinology:  Diabetes - Biguanides Failed 06/15/2023 11:31 AM   Protocol Details  eGFR is 60 or above and within 360 days    HBA1C within 180 days    Valid encounter within last 6 months

## 2023-06-21 ENCOUNTER — OFFICE VISIT (OUTPATIENT)
Dept: CARDIOLOGY CLINIC | Facility: CLINIC | Age: 63
End: 2023-06-21
Payer: COMMERCIAL

## 2023-06-21 VITALS
HEIGHT: 68 IN | HEART RATE: 58 BPM | SYSTOLIC BLOOD PRESSURE: 122 MMHG | DIASTOLIC BLOOD PRESSURE: 78 MMHG | BODY MASS INDEX: 30.92 KG/M2 | OXYGEN SATURATION: 99 % | WEIGHT: 204 LBS

## 2023-06-21 DIAGNOSIS — E78.00 PURE HYPERCHOLESTEROLEMIA: ICD-10-CM

## 2023-06-21 DIAGNOSIS — Z95.5 STATUS POST INSERTION OF DRUG ELUTING CORONARY ARTERY STENT: ICD-10-CM

## 2023-06-21 DIAGNOSIS — E11.65 TYPE 2 DIABETES MELLITUS WITH HYPERGLYCEMIA, WITHOUT LONG-TERM CURRENT USE OF INSULIN (HCC): ICD-10-CM

## 2023-06-21 DIAGNOSIS — I48.0 PAROXYSMAL ATRIAL FIBRILLATION (HCC): Primary | ICD-10-CM

## 2023-06-21 DIAGNOSIS — I25.10 CORONARY ARTERY DISEASE INVOLVING NATIVE CORONARY ARTERY OF NATIVE HEART WITHOUT ANGINA PECTORIS: ICD-10-CM

## 2023-06-21 DIAGNOSIS — I10 BENIGN ESSENTIAL HYPERTENSION: ICD-10-CM

## 2023-06-21 PROCEDURE — 99215 OFFICE O/P EST HI 40 MIN: CPT | Performed by: INTERNAL MEDICINE

## 2023-06-21 NOTE — PROGRESS NOTES
[facility]  Follow up note  Maria Dolores Fleming 57 y o  female MRN: [id number]        Problems    1  Paroxysmal atrial fibrillation (HCC)        2  Pure hypercholesterolemia        3  Coronary artery disease involving native coronary artery of native heart without angina pectoris        4  Benign essential hypertension        5  Type 2 diabetes mellitus with hyperglycemia, without long-term current use of insulin (HCC)        6  Status post insertion of drug eluting coronary artery stent            Impression:    Coronary artery disease  Chest discomfort resulting in ER visit, seen by Feliberto Lincoln, referred for stress echo which was abnormal and discovered to have 95% mid LAD tandem lesions treated with drug-eluting stent x1  On aspirin and clopidogrel    Diabetes  Recent A1c greater than 14  Resulted in rapid progression of CAD  On long-acting insulin and metformin  A1c down to 10  He is endocrinology next month  We discussed cardiac protective diabetic medical therapy in detail today, she will discuss with her endocrinologist    PAF  Only remote recurrence in the presence of morbid obesity prior to bariatric surgery    Lipids  Not at goal, but on atorvastatin 40 mg, diabetes still poorly controlled and likely affecting her lipids    HTN  Well-controlled      Plan:    I am thrilled that she has lost 14 pounds, likely due to the addition of the small dose of HCTZ, with significantly improved leg edema, but probably also because of lifestyle modification  She will start cardiac rehab in July  She has upcoming appointment with her endocrinologist, and I have advised that they discuss Jardiance or [place]  If she does start Jardiance, I suspect we may need to cut back her HCTZ or discontinue it altogether    I am not going to adjust her statin at this time, even though her LDL is not less than 70, I think continued diabetic control will improve those numbers and we will make adjustments once her A1c is closer to goal     HPI:   Mayda Duran is a 57y o  year old female with a history of severe obesity status post bariatric surgery remotely, a remote history of PAF in the setting of obesity, without any recurrence, hypertension and hyperlipidemia, recently severely worsened type 2 diabetes, chest pain prompting nurse practitioner office visit, referral for stress echo which was abnormal, and subcu cardiac catheterization notable for 95% mid LAD lesion status post drug-eluting stent x1  A1c is down to 10  She is on insulin and metformin  She is sees her endocrinologist next month  I am hopeful they will discuss Michael or Uvaldo Villareal  She is on lisinopril/HCTZ 20/25 mg half a tablet a day, she lost 14 pounds, and no longer has any leg edema  Her cholesterol improved, she is on atorvastatin, her LDL was 79 and 100 on 2 different days, cannot explain that, either way in the presence of her uncontrolled diabetes  She has no cardiac symptoms at this time  Her blood pressure is excellent  She is tolerating aspirin and Plavix without any significant bleeding side effects        Review of Systems   Constitutional: Negative for appetite change, diaphoresis, fatigue and fever  Respiratory: Negative for chest tightness, shortness of breath and wheezing  Cardiovascular: Negative for chest pain, palpitations and leg swelling  Gastrointestinal: Negative for abdominal pain and blood in stool  Musculoskeletal: Negative for arthralgias and joint swelling  Skin: Negative for rash  Neurological: Negative for dizziness, syncope and light-headedness  All other systems reviewed and are negative          Past Medical History:   Diagnosis Date   • Diabetes mellitus ([facility] [address] ) 4 2023   • Hypertension    • Obesity     Had weight trell surgery     Social History     Substance and Sexual Activity   Alcohol Use Never     Social History     Substance and Sexual Activity   Drug Use Never     Social History     Tobacco Use   Smoking Status Former • Packs/day: 1 00   • Years: 20 00   • Total pack years: 20 00   • Types: Cigarettes   Smokeless Tobacco Never   Tobacco Comments    20 years ago  Allergies: Allergies   Allergen Reactions   • Other Other (See Comments)     No Nsaids due to weight loss surgery        Medications:     Current Outpatient Medications:   •  aspirin 81 mg chewable tablet, Chew 1 tablet (81 mg total) daily, Disp: 90 tablet, Rfl: 1  •  atorvastatin (LIPITOR) 40 mg tablet, Take 1 tablet (40 mg total) by mouth daily, Disp: 90 tablet, Rfl: 3  •  Blood Glucose Monitoring Suppl (OneTouch Verio Reflect) w/Device KIT, May substitute brand based on insurance coverage  Check glucose ACHS  , Disp: 1 kit, Rfl: 0  •  calcium citrate (CALCITRATE) 950 MG tablet, Take 1 tablet by mouth daily, Disp: , Rfl:   •  cholecalciferol (VITAMIN D3) 1,000 units tablet, Take 1,000 Units by mouth daily, Disp: , Rfl:   •  clopidogrel (PLAVIX) 75 mg tablet, Take 1 tablet (75 mg total) by mouth daily, Disp: 90 tablet, Rfl: 4  •  cyanocobalamin 100 MCG tablet, Take 100 mcg by mouth every other day , Disp: , Rfl:   •  glucose blood (FREESTYLE LITE) test strip, Use as instructed, Disp: 300 strip, Rfl: 3  •  glucose blood (OneTouch Verio) test strip, May substitute brand based on insurance coverage  Check glucose ACHS  , Disp: 200 each, Rfl: 0  •  insulin degludec Lonne Fine FlexTouch) 100 units/mL injection pen, 10 units daily  Dose change, Disp: 15 mL, Rfl: 0  •  Insulin Pen Needle (BD Pen Needle Trudy 2nd Gen) 32G X 4 MM MISC, For use with insulin pen   Pharmacy may dispense brand covered by insurance , Disp: 300 each, Rfl: 3  •  Lancets (freestyle) lancets, Use as instructed, Disp: 300 each, Rfl: 3  •  lisinopril-hydrochlorothiazide (PRINZIDE,ZESTORETIC) 20-25 MG per tablet, Take 0 5 tablets by mouth daily Do not start before June 6, 2023 , Disp: , Rfl: 0  •  metFORMIN (GLUCOPHAGE) 500 mg tablet, 1 tab bid, Disp: 180 tablet, Rfl: 1  •  metoprolol succinate "(TOPROL-XL) 25 mg 24 hr tablet, TAKE ONE TABLET BY MOUTH ONCE DAILY, Disp: 90 tablet, Rfl: 0  •  multivitamin (THERAGRAN) TABS, Take 1 tablet by mouth daily, Disp: , Rfl:   •  OneTouch Delica Lancets 90E MISC, May substitute brand based on insurance coverage  Check glucose ACHS  , Disp: 200 each, Rfl: 0      Vitals:    06/21/23 1502   BP: 122/78   Pulse: 58   SpO2: 99%     Weight (last 2 days)     Date/Time Weight    06/21/23 1502 92 5 (204)        Physical Exam  Constitutional:       General: She is not in acute distress  Appearance: She is well-developed  She is not diaphoretic  HENT:      Head: Normocephalic and atraumatic  Eyes:      General: No scleral icterus  Conjunctiva/sclera: Conjunctivae normal       Pupils: Pupils are equal, round, and reactive to light  Neck:      Thyroid: No thyromegaly  Vascular: No JVD  Trachea: No tracheal deviation  Cardiovascular:      Rate and Rhythm: Normal rate and regular rhythm  Heart sounds: Normal heart sounds  No murmur heard  No friction rub  No gallop  Pulmonary:      Effort: Pulmonary effort is normal  No respiratory distress  Breath sounds: Normal breath sounds  No wheezing or rales  Abdominal:      General: Bowel sounds are normal  There is no distension  Palpations: Abdomen is soft  Tenderness: There is no abdominal tenderness  Musculoskeletal:         General: No tenderness or deformity  Normal range of motion  Cervical back: Normal range of motion and neck supple  Right lower leg: No edema  Left lower leg: No edema  Skin:     General: Skin is warm and dry  Findings: No erythema or rash  Neurological:      Mental Status: She is alert and oriented to person, place, and time  Cranial Nerves: No cranial nerve deficit  Psychiatric:         Judgment: Judgment normal            Laboratory Studies:  NT-proBNP: No results for input(s): \"NTBNP\" in the last 72 hours     Chemistries: No " "results for input(s): \"NA\", \"K\", \"CL\", \"CO2\", \"BUN\" in the last 72 hours  Invalid input(s): \"CREA\", \"GLU\"  Lipid Profile:   Lab Results   Component Value Date    CHOL 149 08/01/2015    CHOL 142 04/25/2015    CHOL 188 09/13/2014     Lab Results   Component Value Date    HDL 48 (L) 06/06/2023    HDL 49 (L) 06/05/2023    HDL 38 (L) 04/17/2023     Lab Results   Component Value Date    LDLCALC 84 06/06/2023    LDLCALC 79 06/05/2023    LDLCALC 109 (H) 04/17/2023     Lab Results   Component Value Date    TRIG 81 06/06/2023    TRIG 74 06/05/2023    TRIG 173 (H) 04/17/2023     Lab Results   Component Value Date    CHOL 149 08/01/2015    LDLDIRECT 77 06/06/2023    HDL 48 (L) 06/06/2023    HDL 59 08/01/2015    TRIG 81 06/06/2023    TRIG 73 08/01/2015     No results for input(s): \"CHOL\", \"LDLDIRECT\", \"HDL\", \"TRIG\" in the last 72 hours  Cardiac testing:     EKG reviewed personally:    No results found for this visit on 06/21/23  Stress Test  5/23-personally reviewed-abnormal stress echo with anterior apical ischemia    Cardiac Cath  6/5/2023-personally reviewed-95% mid LAD status post drug-eluting stent    Dana Feliz MD    Portions of the record may have been created with voice recognition software   Occasional wrong word or \"sound a like\" substitutions may have occurred due to the inherent limitations of voice recognition software   Read the chart carefully and recognize, using context, where substitutions have occurred    "

## 2023-07-13 ENCOUNTER — APPOINTMENT (OUTPATIENT)
Dept: LAB | Facility: MEDICAL CENTER | Age: 63
End: 2023-07-13
Payer: COMMERCIAL

## 2023-07-13 DIAGNOSIS — E78.5 HYPERLIPIDEMIA, UNSPECIFIED HYPERLIPIDEMIA TYPE: ICD-10-CM

## 2023-07-13 DIAGNOSIS — E11.65 TYPE 2 DIABETES MELLITUS WITH HYPERGLYCEMIA, WITHOUT LONG-TERM CURRENT USE OF INSULIN (HCC): ICD-10-CM

## 2023-07-13 DIAGNOSIS — R73.9 HYPERGLYCEMIA: ICD-10-CM

## 2023-07-13 DIAGNOSIS — I10 BENIGN ESSENTIAL HYPERTENSION: ICD-10-CM

## 2023-07-13 LAB
ALBUMIN SERPL BCP-MCNC: 3.4 G/DL (ref 3.5–5)
ALP SERPL-CCNC: 72 U/L (ref 46–116)
ALT SERPL W P-5'-P-CCNC: 28 U/L (ref 12–78)
ANION GAP SERPL CALCULATED.3IONS-SCNC: 2 MMOL/L
AST SERPL W P-5'-P-CCNC: 17 U/L (ref 5–45)
BASOPHILS # BLD AUTO: 0.04 THOUSANDS/ÂΜL (ref 0–0.1)
BASOPHILS NFR BLD AUTO: 1 % (ref 0–1)
BILIRUB SERPL-MCNC: 0.25 MG/DL (ref 0.2–1)
BUN SERPL-MCNC: 50 MG/DL (ref 5–25)
CALCIUM ALBUM COR SERPL-MCNC: 10.2 MG/DL (ref 8.3–10.1)
CALCIUM SERPL-MCNC: 9.7 MG/DL (ref 8.3–10.1)
CHLORIDE SERPL-SCNC: 109 MMOL/L (ref 96–108)
CHOLEST SERPL-MCNC: 134 MG/DL
CO2 SERPL-SCNC: 28 MMOL/L (ref 21–32)
CREAT SERPL-MCNC: 1.09 MG/DL (ref 0.6–1.3)
CREAT UR-MCNC: 67.9 MG/DL
EOSINOPHIL # BLD AUTO: 0.15 THOUSAND/ÂΜL (ref 0–0.61)
EOSINOPHIL NFR BLD AUTO: 3 % (ref 0–6)
ERYTHROCYTE [DISTWIDTH] IN BLOOD BY AUTOMATED COUNT: 12.8 % (ref 11.6–15.1)
EST. AVERAGE GLUCOSE BLD GHB EST-MCNC: 189 MG/DL
GFR SERPL CREATININE-BSD FRML MDRD: 53 ML/MIN/1.73SQ M
GLUCOSE P FAST SERPL-MCNC: 138 MG/DL (ref 65–99)
HBA1C MFR BLD: 8.2 %
HCT VFR BLD AUTO: 32.3 % (ref 34.8–46.1)
HDLC SERPL-MCNC: 45 MG/DL
HGB BLD-MCNC: 10.3 G/DL (ref 11.5–15.4)
IMM GRANULOCYTES # BLD AUTO: 0.01 THOUSAND/UL (ref 0–0.2)
IMM GRANULOCYTES NFR BLD AUTO: 0 % (ref 0–2)
LDLC SERPL CALC-MCNC: 71 MG/DL (ref 0–100)
LYMPHOCYTES # BLD AUTO: 1.59 THOUSANDS/ÂΜL (ref 0.6–4.47)
LYMPHOCYTES NFR BLD AUTO: 29 % (ref 14–44)
MCH RBC QN AUTO: 29.3 PG (ref 26.8–34.3)
MCHC RBC AUTO-ENTMCNC: 31.9 G/DL (ref 31.4–37.4)
MCV RBC AUTO: 92 FL (ref 82–98)
MICROALBUMIN UR-MCNC: 41.5 MG/L (ref 0–20)
MICROALBUMIN/CREAT 24H UR: 61 MG/G CREATININE (ref 0–30)
MONOCYTES # BLD AUTO: 0.67 THOUSAND/ÂΜL (ref 0.17–1.22)
MONOCYTES NFR BLD AUTO: 12 % (ref 4–12)
NEUTROPHILS # BLD AUTO: 3.04 THOUSANDS/ÂΜL (ref 1.85–7.62)
NEUTS SEG NFR BLD AUTO: 55 % (ref 43–75)
NRBC BLD AUTO-RTO: 0 /100 WBCS
PLATELET # BLD AUTO: 295 THOUSANDS/UL (ref 149–390)
PMV BLD AUTO: 11.1 FL (ref 8.9–12.7)
POTASSIUM SERPL-SCNC: 4 MMOL/L (ref 3.5–5.3)
PROT SERPL-MCNC: 7.2 G/DL (ref 6.4–8.4)
RBC # BLD AUTO: 3.51 MILLION/UL (ref 3.81–5.12)
SODIUM SERPL-SCNC: 139 MMOL/L (ref 135–147)
TRIGL SERPL-MCNC: 88 MG/DL
TSH SERPL DL<=0.05 MIU/L-ACNC: 1.66 UIU/ML (ref 0.45–4.5)
WBC # BLD AUTO: 5.5 THOUSAND/UL (ref 4.31–10.16)

## 2023-07-13 PROCEDURE — 80061 LIPID PANEL: CPT

## 2023-07-13 PROCEDURE — 83036 HEMOGLOBIN GLYCOSYLATED A1C: CPT

## 2023-07-13 PROCEDURE — 85025 COMPLETE CBC W/AUTO DIFF WBC: CPT

## 2023-07-13 PROCEDURE — 36415 COLL VENOUS BLD VENIPUNCTURE: CPT

## 2023-07-13 PROCEDURE — 84443 ASSAY THYROID STIM HORMONE: CPT

## 2023-07-13 PROCEDURE — 82570 ASSAY OF URINE CREATININE: CPT

## 2023-07-13 PROCEDURE — 82043 UR ALBUMIN QUANTITATIVE: CPT

## 2023-07-13 PROCEDURE — 80053 COMPREHEN METABOLIC PANEL: CPT

## 2023-07-18 ENCOUNTER — TELEPHONE (OUTPATIENT)
Dept: CARDIAC REHAB | Facility: CLINIC | Age: 63
End: 2023-07-18

## 2023-07-19 ENCOUNTER — CLINICAL SUPPORT (OUTPATIENT)
Dept: CARDIAC REHAB | Facility: CLINIC | Age: 63
End: 2023-07-19
Payer: COMMERCIAL

## 2023-07-19 DIAGNOSIS — Z95.5 STATUS POST INSERTION OF DRUG ELUTING CORONARY ARTERY STENT: Primary | ICD-10-CM

## 2023-07-19 PROCEDURE — 93797 PHYS/QHP OP CAR RHAB WO ECG: CPT

## 2023-07-19 NOTE — PROGRESS NOTES
Cardiac Rehabilitation Plan of Care   Initial Care Plan          Today's date: 2023   # of Exercise Sessions Completed: 1 - Eval   Patient name: Lisa Garcia      : 1960  Age: 57 y.o. MRN: [id number]  Referring Physician: Bimal Pereira, *  Cardiologist: Kasey Gilbert MD   Provider: Francesca  Clinician: Eunice Carey MS, CEP    Dx:   Encounter Diagnosis   Name Primary? • Status post insertion of drug eluting coronary artery stent Yes     Date of onset: 23      SUMMARY OF PROGRESS: Today is Amanda's initial evaluation to begin Cardiac Rehab post DESx1 to mLAD (95% stenosis). Patient had CP for few days leading up to going to ED. Sent for outpatient Stress Echo on 23. Came back positive for Ischemia with ST depression. Cardiac cath Scheduled  with noted stenosis at mLAD (stented), pLAD (30%), and RPDA (45%). Patient's S/S have improved since. Hx of uncontrolled T2D (working with endo), HTN, HDL, PAF (), Bariatric surgery. The patient does not currently follow a formal exercise program at home. She hopes to get back to biking with starting the program and walking her dog more frequently. She has resumed light to moderate ADLs without fatigue and tolerating them well. Depression screening using the PHQ-9 interprets the patient's score of  1  as  1-4 = Minimal Depression and anxiety screening using the MAGED-7 interprets the patient's score of 0 as 0-4  = Not anxious. When addressed, the patient denies having depression/anxiety. They report minimal stressors. Patient reports excellent social/emotional support from her . Information to utilize [employer] was provided as well as contact information for counseling through [employer]. The patient is a former smoker (quit 20+ years ago). Patient admits to 100% medication compliance. They reports the following physical limitations: R knee pain. The patient completed an initial submaximal TM ETT.  They reached max METs at 6:30 minutes, stage 3 (4.3 METs), with test termination of RHR +30. Resting /72 with Normal hemodynamic response to exercise reaching 126/74. BP will be monitored throughout program and cardiologist will be notified of abnormal changes. The patient had no symptoms during exercise. Telemetry during testing revealed NSR at rest and exercise. Amy Garcia was counseled on exercise guidelines to achieve a minimum of 150 mins/wk of moderate intensity (RPE 4-6) exercise and encouraged to add 1-2 days of exercise on opposite days of cardiac rehab as tolerated. We discussed current dietary habits and goals of heart healthy eating for lipid management, diabetes management and weight loss. The patient has T2D, no insulin, and is working on lowering her A1C (might have med changes in future). Patient's personal goals include: increase heart healthy eating, return to exercise at local gym, walking/bike with dog, continue hobbies, weight loss, maintain emotional health. The patient's CAD risk factors include: inactivity, obesity/overweight, hypertension, hyperlipidemia and diabetes. Her education will focus on lifestyle modification/education specific to Her needs. Patient will attend group education classes on heart healthy eating, reading food labels, stress management, risk factor reduction, understanding heart disease and common heart medications. Amy Garcia will attend 35 monitored exercise sessions, 3x/wk for 12-18 weeks beginning 7/21/23.        Medication compliance: Yes   Comments: Pt reports to be compliant with medications  Fall Risk: Low   Comments: Ambulates with a steady gait with no assist device and Denies a fall in the past 6 months    EKG Interpretation: NSR      EXERCISE ASSESSMENT and PLAN    Exercise Prescription:      Frequency: 3 days/week   Supplement with home exercise 2+ days/wk as tolerated       Minutes: 30-35         METS: 3-4            HR: resting + 30   RPE: 4-6         Modalities: Treadmill, UBE, Lifecycle and NuStep      30 Day Goals for Exercise Progression:    Frequency: 3 days/week of cardiac rehab       Supplement with home exercise 2+ days/wk as tolerated    Minutes: 35-45                              >150 mins/wk of moderate intensity exercise   METS: 3.5-4.5   HR: resting + 390     RPE: 4-6   Modalities: Treadmill, UBE, Lifecycle and NuStep    Strength trainin-3 days / week  12-15 repetitions  1-2 sets per modality   Will be added following 2-3 weeks of monitored exercise sessions   Modalities: Chest Press, Pull Downs, Arm Curl, Seated Row and Sit to [facility] Exercise: none    Goals: 10% improvement in functional capacity - based on max METs achieved in fitness assessment, improved DASI score by 10%, Increase in exercise capacity by 40% - based on peak METs tolerated in cardiac rehab exercise session, Exercise 5 days/wk, >150mins/wk of moderate intensity exercise, Resume ADLs with increased strength, Attend Rehab regularly, Start a walking program and return to regular exercise at the gym    Progression Toward Goals:  Reviewed Pt goals and determined plan of care, Patient will add home exercise, increase strength/endurance, return to all ADLs without S/S in the next 30 days, Will continue to educate and progress as tolerated.     Education: benefit of exercise for CAD risk factors, home exercise guidelines, AHA guidelines to achieve >150 mins/wk of moderate exercise and RPE scale   Plan:education on home exercise guidelines, home exercise 30+ mins 2 days opposite CR and Education class: Risk Factors for Heart Disease  Readiness to change: Preparation:  (Getting ready to change)       NUTRITION ASSESSMENT AND PLAN    Weight control:    Starting weight: 201 lbs    Current weight:     Waist circumference:    Startin.5 in    Current:      Diabetes: T2D  A1c: 8.2    last measured: 23    Lipid management: Discussed diet and lipid management and Last lipid profile 23  Chol 134 TRG 88  HDL 45  LDL 71    Goals:reduced BMI to < 25, decreased body fat % <33%  (F), reduced waist circumference <35 inches (F), Improved Rate Your Plate score  >48, 5.5-6%  wt loss, increase intake of fish, shellfish, increase intake of meatless meals, use low fat dairy, Eat 4-5 cups of fruits and vegetables daily and Increase intake of nuts and seeds    Measurable goals were based Rate Your Plate Dietary Self-Assessment. These are the areas in which the patient could score higher on the assessment. Goals include recommendations for a heart healthy diet based on American Heart Association. Progression Toward Goals: Reviewed Pt goals and determined plan of care, Patient will continue to follow a heart healthy diet in the next 30 days, Will continue to educate and progress as tolerated.     Education: heart healthy eating  low sodium diet  hydration  nutrition for Improved BG control  target goal for A1c <7.0  exercise and diabetes management   Plan: Education class: Reading Food Labels, Education Class: Heart Healthy Eating, switch to low fat cheeses, replace refined grain bread with whole grain bread, replace unhealthy snacks with fruits & vegs, avoid processed foods, remove salt shaker from table, will replace sugar sweetened cereals with whole grain or oatmeal, monitor home blood glucose and keep added daily sugar <25g/day  Readiness to change: Preparation:  (Getting ready to change)       PSYCHOSOCIAL ASSESSMENT AND PLAN    Emotional:  Depression assessment:  PHQ-9 = 1  1-4 = Minimal Depression            Anxiety measure:  MAGED-7 = 0  0-4  = Not anxious  Self-reported stress level:  6  Social support: Excellent and Patient reports excellent emotional/social support from     Goals:  Reduce perceived stress to 1-3/10, improved [facility] QOL < 27, Physical Fitness in [facility] Score < 3, Social Support in [facility] Score < 3, Overall Health in [facility] Score < 3, improved positive thoughts of well being and increased energy    Progression Toward Goals: Reviewed Pt goals and determined plan of care, Patient will maintain emotional health in the next 30 days, Will continue to educate and progress as tolerated. Education: benefits of a positive support system  Plan: Class: Stress and Your Health, Class: Relaxation, Practice relaxation techniques and Exercise  Readiness to change: Preparation:  (Getting ready to change)       OTHER CORE COMPONENTS     Tobacco:   Social History     Tobacco Use   Smoking Status Former   • Packs/day: 1.00   • Years: 20.00   • Total pack years: 20.00   • Types: Cigarettes   Smokeless Tobacco Never   Tobacco Comments    20 years ago. Tobacco Use Intervention:   N/A: Pt has a remote history of smoking    Anginal Symptoms:  None   NTG use: No prescription    Blood pressure:    Restin/72   Exercise: 114/7 - 126/74    Recovery: 112/70     Goals: consistent BP < 130/80, reduced dietary sodium <2300mg, moderate intensity exercise >150 mins/wk, medication compliance, reduce number of medications  needed for BP control and Abstain from smoking    Progression Toward Goals: Reviewed Pt goals and determined plan of care, Patient will monitor BP at home and abstain from smoking in the next 30 days, Will continue to educate and progress as tolerated.     Education:  understanding high blood pressure and it's relationship to CAD  Plan: Class: Understanding Heart Disease, Class: Common Heart Medications, Avoid Processed foods, engage in regular exercise, check labels for sodium content and monitor home BP  Readiness to change: Preparation:  (Getting ready to change)

## 2023-07-19 NOTE — PROGRESS NOTES
CARDIAC REHAB ASSESSMENT    Today's date: 2023  Patient name: Darcy Costa     : 1960       MRN: [id number]  PCP: Juan C Mari DO  Referring Physician: Aura Samano, *  Cardiologist: Dmitriy Patel MD   Surgeon: Aura Samano MD   Dx:   Encounter Diagnosis   Name Primary? • Status post insertion of drug eluting coronary artery stent Yes       Date of onset: 23  Cultural needs: none     Weight    Wt Readings from Last 1 Encounters:   23 92.5 kg (204 lb)      Height:   Ht Readings from Last 1 Encounters:   23 5' 8" (1.727 m)     Medical History:   Past Medical History:   Diagnosis Date   • Diabetes mellitus ([address]) 2023   • Hypertension    • Obesity     Had weight trell surgery          Physical Limitations: R Knee pain     Fall Risk: Low   Comments: Ambulates with a steady gait with no assist device    Anginal Equivalent: None/denies angina   NTG use: No prescription    Risk Factors   Cholesterol: Yes  Smoking: Former user  HTN: Yes  DM: Type 2   insulin  Obesity: Yes   Inactivity: Yes   Stress: perceived stress: -7/10   Stressors: recently due to puppies    Goals for Stress Management:Practice Relaxation Techniques and Exercise    Family History:  Family History   Problem Relation Age of Onset   • Arrhythmia Mother         atrial flutter   • Skin cancer Mother    • Hypertension Father    • Hyperlipidemia Father    • Atrial fibrillation Sister    • Atrial fibrillation Brother    • Other Brother         sarosis of liver   • Heart attack Paternal Uncle    • Breast cancer Maternal Grandmother    • Throat cancer Maternal Grandfather    • Stroke Neg Hx    • Anuerysm Neg Hx    • Clotting disorder Neg Hx    • Heart failure Neg Hx    • Coronary artery disease Neg Hx        Allergies:  Other  ETOH:   Social History     Substance and Sexual Activity   Alcohol Use Never         Current Medications:   Current Outpatient Medications   Medication Sig Dispense Refill   • aspirin 81 mg chewable tablet Chew 1 tablet (81 mg total) daily 90 tablet 1   • atorvastatin (LIPITOR) 40 mg tablet Take 1 tablet (40 mg total) by mouth daily 90 tablet 3   • Blood Glucose Monitoring Suppl (OneTouch Verio Reflect) w/Device KIT May substitute brand based on insurance coverage. Check glucose ACHS. 1 kit 0   • calcium citrate (CALCITRATE) 950 MG tablet Take 1 tablet by mouth daily     • cholecalciferol (VITAMIN D3) 1,000 units tablet Take 1,000 Units by mouth daily     • clopidogrel (PLAVIX) 75 mg tablet Take 1 tablet (75 mg total) by mouth daily 90 tablet 4   • cyanocobalamin 100 MCG tablet Take 100 mcg by mouth every other day      • glucose blood (FREESTYLE LITE) test strip Use as instructed 300 strip 3   • glucose blood (OneTouch Verio) test strip May substitute brand based on insurance coverage. Check glucose ACHS. 200 each 0   • insulin degludec Kirke Anshul FlexTouch) 100 units/mL injection pen 10 units daily. Dose change 15 mL 0   • Insulin Pen Needle (BD Pen Needle Trudy 2nd Gen) 32G X 4 MM MISC For use with insulin pen. Pharmacy may dispense brand covered by insurance. 300 each 3   • Lancets (freestyle) lancets Use as instructed 300 each 3   • lisinopril-hydrochlorothiazide (PRINZIDE,ZESTORETIC) 20-25 MG per tablet Take 0.5 tablets by mouth daily Do not start before June 6, 2023.  0   • metFORMIN (GLUCOPHAGE) 500 mg tablet 1 tab bid 180 tablet 1   • metoprolol succinate (TOPROL-XL) 25 mg 24 hr tablet TAKE ONE TABLET BY MOUTH ONCE DAILY 90 tablet 0   • multivitamin (THERAGRAN) TABS Take 1 tablet by mouth daily     • OneTouch Delica Lancets 63P MISC May substitute brand based on insurance coverage. Check glucose ACHS. 200 each 0     No current facility-administered medications for this visit.          Functional Status Prior to Diagnosis for Treatment   Occupation: full time job [employer]  Recreation: Dog training/showings   ADL’s: Capable of performing light to moderate ADLs limited by fatigue [place]: No limitations  Exercise: use to go to fitness classes pre COVID lock down   Other: started to not follow heart healthy eating     Current Functional Status  Occupation: full time job [employer]   Recreation: dog training/showings   ADL’s: resumed all ADLs  [place]: No limitations  Exercise: none    Other: Active - taking care of puppies, increase in ability to perform higher intensity ADLs like going up stairs     Patient Specific Goals:  Return to fitness classes     Short Term Program Goals: dietary modifications increased strength improved energy/stamina with ADLs exercise 120-150 mins/wk improved BG control wt loss 1-2 ppw    Long Term Goals: increased maximal walking duration  increased intial training workload  Improved [facility] Activity Status score  Improved functional capacity  Improved Quality of [facility] - [facility] score reduced  Improved lipid profile  Reduced body fat%  Reduced waist circumference  Smoking cessation  Abstain from smoking  Improved A1c  Improved fasting glucose  Reduced stress  improved Rate Your Plate Score    Ability to reach goals/rehabilitation potential:  Excellent    Projected return to function: 12 weeks  Objective tests: sub-max TM ETT      Nutritional   Reviewed details of Rate your Plate. Discussed key elements of heart healthy eating. Reviewed patient goals for dietary modifications and their clinical implications. Reviewed most recent lipid profile.      Goals for dietary modification based on Rate Your Plate Dietary Assessment:  choose lean cuts of meat  poultry without the skin  low fat ground meat and poultry  eliminate processed meats  reduce portions of meat to 3 oz  increase fish intake  more meatless meals  low fat dairy   reduced fat cheese  increase whole grains  increase fruits and vegetables  eliminate butter  low sodium  improved snack choices  more nuts/seeds  reduce sweets/frozen desserts  heathier choices while dining out      Emotional/Social  Patient reports he/she is coping well with good social support and denies depression or anxiety    Marital status:     Domestic Violence Screening: No    Comments: Hospital with CP 5/13/23 for 3 days. Stress Echo ordered 6/2 - Ischemia noted with 2-2.5mm depression. Cardiac cath scheduled for 6/5. DESx1 to mLAD (95%), remaining stenosis at pLAD (30%), RPDA (45%).      Hx PAF (2012), T2D - insulin, bariatric surgery

## 2023-07-21 ENCOUNTER — CLINICAL SUPPORT (OUTPATIENT)
Dept: CARDIAC REHAB | Facility: CLINIC | Age: 63
End: 2023-07-21
Payer: COMMERCIAL

## 2023-07-21 ENCOUNTER — OFFICE VISIT (OUTPATIENT)
Dept: ENDOCRINOLOGY | Facility: CLINIC | Age: 63
End: 2023-07-21
Payer: COMMERCIAL

## 2023-07-21 ENCOUNTER — TELEPHONE (OUTPATIENT)
Dept: CARDIOLOGY CLINIC | Facility: CLINIC | Age: 63
End: 2023-07-21

## 2023-07-21 VITALS
DIASTOLIC BLOOD PRESSURE: 76 MMHG | SYSTOLIC BLOOD PRESSURE: 114 MMHG | WEIGHT: 203 LBS | BODY MASS INDEX: 30.77 KG/M2 | HEIGHT: 68 IN | HEART RATE: 76 BPM

## 2023-07-21 DIAGNOSIS — E11.65 TYPE 2 DIABETES MELLITUS WITH HYPERGLYCEMIA, WITHOUT LONG-TERM CURRENT USE OF INSULIN (HCC): Primary | ICD-10-CM

## 2023-07-21 DIAGNOSIS — Z95.5 STATUS POST INSERTION OF DRUG ELUTING CORONARY ARTERY STENT: Primary | ICD-10-CM

## 2023-07-21 DIAGNOSIS — R80.9 MICROALBUMINURIA: ICD-10-CM

## 2023-07-21 DIAGNOSIS — E78.00 PURE HYPERCHOLESTEROLEMIA: ICD-10-CM

## 2023-07-21 DIAGNOSIS — D64.9 ANEMIA, UNSPECIFIED TYPE: ICD-10-CM

## 2023-07-21 DIAGNOSIS — E66.9 CLASS 1 OBESITY WITHOUT SERIOUS COMORBIDITY WITH BODY MASS INDEX (BMI) OF 30.0 TO 30.9 IN ADULT, UNSPECIFIED OBESITY TYPE: ICD-10-CM

## 2023-07-21 DIAGNOSIS — I10 BENIGN ESSENTIAL HYPERTENSION: ICD-10-CM

## 2023-07-21 PROCEDURE — 93798 PHYS/QHP OP CAR RHAB W/ECG: CPT

## 2023-07-21 PROCEDURE — 99214 OFFICE O/P EST MOD 30 MIN: CPT

## 2023-07-21 NOTE — PROGRESS NOTES
Established Patient Progress Note    CC: Follow up for type 2 diabetes mellitus    Impression & Plan:    Problem List Items Addressed This Visit        Endocrine    Type 2 diabetes mellitus with hyperglycemia, without long-term current use of insulin (HCC) - Primary     HgbA1C likely unreliable given history of low hgb in the past. Most recent hgb level is 10. 3. will add fructosamine level to be drawn with next set of lab work. For now, continue monitoring blood sugars. Advised patient to check more frequently throughout the day and stagger timings. If checking after meals, she should be checking 2 hours after the meal. For additional glycemic control, cardiac and kidney protection, will add 10 mg Jardiance. Patient was instructed by her cardiologist to stop her HCTZ when starting. Side effects discussed including increased risk for UTI's/yeast infections. She denies any history of UTI's/yeast infections. Also advised patient to stay hydrated while using this medication. She is due for her diabetic eye exam which she states she will schedule. We also discussed the benefits of using a CGM today. She will look into which device she would like to wear and notify us. Patient to notify for BG < 70 or > 250. Lab Results   Component Value Date    HGBA1C 8.2 (H) 07/13/2023            Relevant Medications    Empagliflozin (Jardiance) 10 MG TABS tablet    Other Relevant Orders    Fructosamine- Lab Collect    HEMOGLOBIN A1C W/ EAG ESTIMATION Lab Collect       Cardiovascular and Mediastinum    Benign essential hypertension     Controlled in office. Continue with current regimen. Other    Hyperlipidemia     Well controlled.  Continue with current reigmen         Class 1 obesity without serious comorbidity with body mass index (BMI) of 30.0 to 30.9 in adult     Continue with lifestyle modifications        Other Visit Diagnoses     Microalbuminuria        Relevant Medications    Empagliflozin (Jardiance) 10 MG TABS tablet    Anemia, unspecified type        Relevant Orders    Iron Panel (Includes Ferritin, Iron Sat%, Iron, and TIBC)          Orders Placed This Encounter   Procedures   • Fructosamine- Lab Collect     Standing Status:   Future     Standing Expiration Date:   2024   • HEMOGLOBIN A1C W/ EAG ESTIMATION Lab Collect     Standing Status:   Future     Standing Expiration Date:   2024       History of Present Illness:   Osito Cates is a 57 y.o. female with a history of type 2 diabetes, hypertension, hyperlipidemia, obesity, Tramaine-en-Y gastric bypass surgery. Complications: miroalbuminuria. Last A1C was 8.2. Home glucose monitoring: home glucose monitor    April- metformin started and humalog stopped for low blood sugars. tresiba also decreased to 20 units from 25 units. Patient continued to have low blood sugars, tresiba reduced further. She is now on 10 units daily. 2023- had cardiac catheterization. Seeing cardiology. Started cardiac rehab this past Wednesday. Patient reports morning fasting B, 120's, 140, 130's . Reports healthy diet and staying active.     Hypoglycemia: no  Recent hospitalizations or illnesses: yes- see above  Problems with current regimen: no     Current regimen:   Metformin 500 mg BID  Tresiba 10 units daily    Last Eye Exam: needs to schedule  Last Foot Exam: UTD, 23    ACE/ARB: lisinopril  Has hyperlipidemia: Taking atorvastatain       Patient Active Problem List   Diagnosis   • Atrial fibrillation ([address])   • Hyperlipidemia   • Class 1 obesity without serious comorbidity with body mass index (BMI) of 30.0 to 30.9 in adult   • Benign essential hypertension   • Type 2 diabetes mellitus with hyperglycemia, without long-term current use of insulin (HCC)   • Hyponatremia   • Coronary artery disease involving native coronary artery   • Status post insertion of drug eluting coronary artery stent      Past Medical History:   Diagnosis Date   • Diabetes mellitus ([address]) 4    • Hypertension    • Obesity     Had weight trell surgery      Past Surgical History:   Procedure Laterality Date   • BARIATRIC SURGERY  2015   • CARDIAC CATHETERIZATION N/A 2023    Procedure: Cardiac Coronary Angiogram;  Surgeon: Dhruv Herron DO;  [facility]; Service: Cardiology   • CARDIAC CATHETERIZATION N/A 2023    Procedure: Cardiac pci;  Surgeon: Dhruv Herron DO;  [facility]; Service: Cardiology   •  SECTION      32 years ago (2019)      Family History   Problem Relation Age of Onset   • Arrhythmia Mother         atrial flutter   • Skin cancer Mother    • Hypertension Father    • Hyperlipidemia Father    • Atrial fibrillation Sister    • Atrial fibrillation Brother    • Other Brother         sarosis of liver   • Heart attack Paternal Uncle    • Breast cancer Maternal Grandmother    • Throat cancer Maternal Grandfather    • Stroke Neg Hx    • Anuerysm Neg Hx    • Clotting disorder Neg Hx    • Heart failure Neg Hx    • Coronary artery disease Neg Hx      Social History     Tobacco Use   • Smoking status: Former     Packs/day: 1.00     Years: 20.00     Total pack years: 20.00     Types: Cigarettes   • Smokeless tobacco: Never   • Tobacco comments:     20 years ago. Substance Use Topics   • Alcohol use: Never     Allergies   Allergen Reactions   • Other Other (See Comments)     No Nsaids due to weight loss surgery          Current Outpatient Medications:   •  aspirin 81 mg chewable tablet, Chew 1 tablet (81 mg total) daily, Disp: 90 tablet, Rfl: 1  •  atorvastatin (LIPITOR) 40 mg tablet, Take 1 tablet (40 mg total) by mouth daily, Disp: 90 tablet, Rfl: 3  •  Blood Glucose Monitoring Suppl (OneTouch Verio Reflect) w/Device KIT, May substitute brand based on insurance coverage. Check glucose ACHS. , Disp: 1 kit, Rfl: 0  •  calcium citrate (CALCITRATE) 950 MG tablet, Take 1 tablet by mouth daily, Disp: , Rfl:   •  cholecalciferol (VITAMIN D3) 1,000 units tablet, Take 1,000 Units by mouth daily, Disp: , Rfl:   •  clopidogrel (PLAVIX) 75 mg tablet, Take 1 tablet (75 mg total) by mouth daily, Disp: 90 tablet, Rfl: 4  •  cyanocobalamin 100 MCG tablet, Take 100 mcg by mouth daily, Disp: , Rfl:   •  Empagliflozin (Jardiance) 10 MG TABS tablet, Take 1 tablet (10 mg total) by mouth every morning, Disp: 90 tablet, Rfl: 2  •  glucose blood (FREESTYLE LITE) test strip, Use as instructed, Disp: 300 strip, Rfl: 3  •  insulin degludec Jolly Gilbert FlexTouch) 100 units/mL injection pen, 10 units daily. Dose change, Disp: 15 mL, Rfl: 0  •  Insulin Pen Needle (BD Pen Needle Trudy 2nd Gen) 32G X 4 MM MISC, For use with insulin pen. Pharmacy may dispense brand covered by insurance., Disp: 300 each, Rfl: 3  •  Lancets (freestyle) lancets, Use as instructed, Disp: 300 each, Rfl: 3  •  lisinopril-hydrochlorothiazide (PRINZIDE,ZESTORETIC) 20-25 MG per tablet, Take 0.5 tablets by mouth daily Do not start before June 6, 2023., Disp: , Rfl: 0  •  metFORMIN (GLUCOPHAGE) 500 mg tablet, 1 tab bid, Disp: 180 tablet, Rfl: 1  •  metoprolol succinate (TOPROL-XL) 25 mg 24 hr tablet, TAKE ONE TABLET BY MOUTH ONCE DAILY, Disp: 90 tablet, Rfl: 0  •  multivitamin (THERAGRAN) TABS, Take 1 tablet by mouth daily, Disp: , Rfl:   •  glucose blood (OneTouch Verio) test strip, May substitute brand based on insurance coverage. Check glucose ACHS. (Patient not taking: Reported on 7/21/2023), Disp: 200 each, Rfl: 0  •  OneTouch Delica Lancets 93K MISC, May substitute brand based on insurance coverage. Check glucose ACHS. (Patient not taking: Reported on 7/21/2023), Disp: 200 each, Rfl: 0    Review of Systems    Physical Exam:  Body mass index is 30.87 kg/m².   /76   Pulse 76   Ht 5' 8" (1.727 m)   Wt 92.1 kg (203 lb)   BMI 30.87 kg/m²    Wt Readings from Last 3 Encounters:   07/21/23 92.1 kg (203 lb)   06/21/23 92.5 kg (204 lb)   06/05/23 92.5 kg (204 lb)       Physical Exam  Diabetic Foot Exam    Labs:   Lab Results   Component Value Date    HGBA1C 8.2 (H) 07/13/2023    HGBA1C 10.0 (H) 06/05/2023    HGBA1C >14.0 (H) 04/15/2023     Lab Results   Component Value Date    CREATININE 1.09 07/13/2023    CREATININE 1.04 06/06/2023    CREATININE 1.01 06/05/2023    BUN 50 (H) 07/13/2023     08/01/2015    K 4.0 07/13/2023     (H) 07/13/2023    CO2 28 07/13/2023     eGFR   Date Value Ref Range Status   07/13/2023 53 ml/min/1.73sq m Final     Lab Results   Component Value Date    CHOL 149 08/01/2015    HDL 45 (L) 07/13/2023    TRIG 88 07/13/2023     Lab Results   Component Value Date    ALT 28 07/13/2023    AST 17 07/13/2023    ALKPHOS 72 07/13/2023    BILITOT 0.54 08/01/2015     Lab Results   Component Value Date    KTU6KZRXOUGL 1.660 07/13/2023    ITQ9MTAWMHWC 2.337 04/15/2023    OVO0LFFTQJJY 2.840 03/09/2019     No results found for: "Mendez Moran", "JEISON"      There are no Patient Instructions on file for this visit. Discussed with the patient and all questioned fully answered. She will call me if any problems arise.

## 2023-07-21 NOTE — ASSESSMENT & PLAN NOTE
HgbA1C likely unreliable given history of low hgb in the past. Most recent hgb level is 10. 3. will add fructosamine level to be drawn with next set of lab work. For now, continue monitoring blood sugars. Advised patient to check more frequently throughout the day and stagger timings. If checking after meals, she should be checking 2 hours after the meal. For additional glycemic control, cardiac and kidney protection, will add 10 mg Jardiance. Patient was instructed by her cardiologist to stop her HCTZ when starting. Side effects discussed including increased risk for UTI's/yeast infections. She denies any history of UTI's/yeast infections. Also advised patient to stay hydrated while using this medication. She is due for her diabetic eye exam which she states she will schedule. We also discussed the benefits of using a CGM today. She will look into which device she would like to wear and notify us. Patient to notify for BG < 70 or > 250.    Lab Results   Component Value Date    HGBA1C 8.2 (H) 07/13/2023

## 2023-07-21 NOTE — TELEPHONE ENCOUNTER
Patient called stating she was started on Jardiance 10 mg today & HCTZ was D/C 'd. She is concerned about her hypertension. Please advise.

## 2023-07-24 NOTE — TELEPHONE ENCOUNTER
Please tell her that that sent absolutely fair concern, but both essentially work is diuretics, so have her make sure she does not close follow-up, at least weekly blood pressures and if anything becomes elevated over 135/85, give us or with her PCP a call

## 2023-07-25 ENCOUNTER — CLINICAL SUPPORT (OUTPATIENT)
Dept: CARDIAC REHAB | Facility: CLINIC | Age: 63
End: 2023-07-25
Payer: COMMERCIAL

## 2023-07-25 DIAGNOSIS — Z95.5 STATUS POST INSERTION OF DRUG ELUTING CORONARY ARTERY STENT: Primary | ICD-10-CM

## 2023-07-25 PROCEDURE — 93798 PHYS/QHP OP CAR RHAB W/ECG: CPT

## 2023-07-25 NOTE — TELEPHONE ENCOUNTER
Patient returned call & stated she has not yet started Jardiance due to pharmacy but will be & will monitor BP. Communicated Dr Karla Hennessy message to patient. She stated she will take her BP & report BP>135/85. Also, stated she is in Cardiac Rehab & they take her BP every time she is there. Patient verbalized understanding.

## 2023-07-27 ENCOUNTER — CLINICAL SUPPORT (OUTPATIENT)
Dept: CARDIAC REHAB | Facility: CLINIC | Age: 63
End: 2023-07-27
Payer: COMMERCIAL

## 2023-07-27 DIAGNOSIS — Z95.5 STATUS POST INSERTION OF DRUG ELUTING CORONARY ARTERY STENT: Primary | ICD-10-CM

## 2023-07-27 PROCEDURE — 93798 PHYS/QHP OP CAR RHAB W/ECG: CPT

## 2023-07-28 ENCOUNTER — CLINICAL SUPPORT (OUTPATIENT)
Dept: CARDIAC REHAB | Facility: CLINIC | Age: 63
End: 2023-07-28
Payer: COMMERCIAL

## 2023-07-28 DIAGNOSIS — Z95.5 STATUS POST INSERTION OF DRUG ELUTING CORONARY ARTERY STENT: Primary | ICD-10-CM

## 2023-07-28 PROCEDURE — 93798 PHYS/QHP OP CAR RHAB W/ECG: CPT

## 2023-08-01 ENCOUNTER — CLINICAL SUPPORT (OUTPATIENT)
Dept: CARDIAC REHAB | Facility: CLINIC | Age: 63
End: 2023-08-01
Payer: COMMERCIAL

## 2023-08-01 DIAGNOSIS — Z95.5 STATUS POST INSERTION OF DRUG ELUTING CORONARY ARTERY STENT: Primary | ICD-10-CM

## 2023-08-01 PROCEDURE — 93798 PHYS/QHP OP CAR RHAB W/ECG: CPT

## 2023-08-03 ENCOUNTER — OFFICE VISIT (OUTPATIENT)
Dept: FAMILY MEDICINE CLINIC | Facility: MEDICAL CENTER | Age: 63
End: 2023-08-03
Payer: COMMERCIAL

## 2023-08-03 ENCOUNTER — CLINICAL SUPPORT (OUTPATIENT)
Dept: CARDIAC REHAB | Facility: CLINIC | Age: 63
End: 2023-08-03
Payer: COMMERCIAL

## 2023-08-03 VITALS
WEIGHT: 204 LBS | OXYGEN SATURATION: 98 % | RESPIRATION RATE: 16 BRPM | HEART RATE: 87 BPM | HEIGHT: 68 IN | DIASTOLIC BLOOD PRESSURE: 72 MMHG | TEMPERATURE: 98.1 F | BODY MASS INDEX: 30.92 KG/M2 | SYSTOLIC BLOOD PRESSURE: 126 MMHG

## 2023-08-03 DIAGNOSIS — Z00.00 ANNUAL PHYSICAL EXAM: Primary | ICD-10-CM

## 2023-08-03 DIAGNOSIS — Z12.31 ENCOUNTER FOR SCREENING MAMMOGRAM FOR MALIGNANT NEOPLASM OF BREAST: ICD-10-CM

## 2023-08-03 DIAGNOSIS — Z12.11 SCREEN FOR COLON CANCER: ICD-10-CM

## 2023-08-03 DIAGNOSIS — Z95.5 STATUS POST INSERTION OF DRUG ELUTING CORONARY ARTERY STENT: Primary | ICD-10-CM

## 2023-08-03 DIAGNOSIS — Z11.4 SCREENING FOR HIV (HUMAN IMMUNODEFICIENCY VIRUS): ICD-10-CM

## 2023-08-03 DIAGNOSIS — Z11.59 NEED FOR HEPATITIS C SCREENING TEST: ICD-10-CM

## 2023-08-03 DIAGNOSIS — Z13.820 SCREENING FOR OSTEOPOROSIS: ICD-10-CM

## 2023-08-03 PROCEDURE — 93798 PHYS/QHP OP CAR RHAB W/ECG: CPT

## 2023-08-03 PROCEDURE — 99396 PREV VISIT EST AGE 40-64: CPT | Performed by: STUDENT IN AN ORGANIZED HEALTH CARE EDUCATION/TRAINING PROGRAM

## 2023-08-03 NOTE — PROGRESS NOTES
[facility] HEALTH MAINTENANCE OFFICE VISIT  [facility] - [facility] [place]    NAME: Gary Gifford  AGE: 57 y.o. SEX: female  : 1960     DATE: 8/3/2023    Assessment and Plan     1. Annual physical exam    2. Encounter for screening mammogram for malignant neoplasm of breast  -     Mammo screening bilateral w 3d & cad; Future; Expected date: 2023    3. Screening for osteoporosis  -     DXA bone density spine hip and pelvis; Future; Expected date: 2023    4. Screen for colon cancer  -     Ambulatory Referral to Gastroenterology; Future    5. Need for hepatitis C screening test  -     Hepatitis C Antibody; Future    6. Screening for HIV (human immunodeficiency virus)  -     HIV 1/2 AG/AB w Reflex [facility] for 2 yr old and above; Future        · Patient Counseling:   · Nutrition: Stressed importance of a well balanced diet, moderation of sodium/saturated fat, caloric balance and sufficient intake of fiber  · Exercise: Stressed the importance of regular exercise with a goal of 150 minutes per week  · Dental Health: Discussed daily flossing and brushing and regular dental visits   · Advised about sunscreen use and sun protection  · Immunizations reviewed:   · Defers COVID-19 bivalent vaccine  · She will consider tetanus booster in future she states  · We will consider Shingrix vaccination series in future  · Discussed benefits of:   · Overdue for follow-up with her gynecologist, strongly advised her to schedule appointment, will provide mammogram prescription and she is unsure when her last Pap smear was  · 2013 last colonoscopy, will be due for repeat screening  · DXA scan ordered   • BMI Counseling: Body mass index is 31.02 kg/m². Discussed with patient's BMI with her.      Chief Complaint     Chief Complaint   Patient presents with   • Annual Exam       History of Present Illness     HPI    Well Adult Physical   Patient here for a comprehensive physical exam. Patient is status stent placement, currently attending cardiac rehab 3 times a week. She states she is doing quite well. Diet and Physical Activity  Diet: well balanced diet  Exercise: Cardiac rehab 3x/week       Depression Screen  PHQ-2/9 Depression Screening    Little interest or pleasure in doing things: 0 - not at all  Feeling down, depressed, or hopeless: 0 - not at all  PHQ-2 Score: 0  PHQ-2 Interpretation: Negative depression screen          General Health  Hearing: Normal:  bilateral  Vision: most recent eye exam >1 year and wears reading glasses   Dental: regular dental visits, brushes teeth twice daily and flosses teeth occasionally    Reproductive Health  Overdue for follow-up with gynecologist      The following portions of the patient's history were reviewed and updated as appropriate: allergies, current medications, past family history, past medical history, past social history, past surgical history and problem list.    Review of Systems     Review of Systems     As noted in HPI     Past Medical History     Past Medical History:   Diagnosis Date   • Diabetes mellitus ([address]) 4    • Hypertension    • Obesity     Had weight trell surgery       Past Surgical History     Past Surgical History:   Procedure Laterality Date   • BARIATRIC SURGERY     • CARDIAC CATHETERIZATION N/A 2023    Procedure: Cardiac Coronary Angiogram;  Surgeon: Miriam Irby DO;  [facility]; Service: Cardiology   • CARDIAC CATHETERIZATION N/A 2023    Procedure: Cardiac pci;  Surgeon: Miriam Irby DO;  [facility];   Service: Cardiology   •  SECTION      32 years ago (2019)       Social History     Social History     Socioeconomic History   • Marital status: /Civil Union     Spouse name: None   • Number of children: None   • Years of education: None   • Highest education level: None   Occupational History   • None   Tobacco Use   • Smoking status: Former     Packs/day: 1.00     Years: 20.00     Total pack years: 20.00     Types: Cigarettes   • Smokeless tobacco: Never   • Tobacco comments:     20 years ago. Vaping Use   • Vaping Use: Never used   Substance and Sexual Activity   • Alcohol use: Never   • Drug use: Never   • Sexual activity: Not Currently     Partners: Male     Birth control/protection: Post-menopausal   Other Topics Concern   • None   Social History Narrative   • None     Social Determinants of Health     Financial Resource Strain: Not on file   Food Insecurity: Not on file   Transportation Needs: Not on file   Physical Activity: Not on file   Stress: Not on file   Social Connections: Not on file   Intimate Partner Violence: Not on file   Housing Stability: Not on file       Family History     Family History   Problem Relation Age of Onset   • Arrhythmia Mother         atrial flutter   • Skin cancer Mother    • Hypertension Father    • Hyperlipidemia Father    • Atrial fibrillation Sister    • Atrial fibrillation Brother    • Other Brother         sarosis of liver   • Heart attack Paternal Uncle    • Breast cancer Maternal Grandmother    • Throat cancer Maternal Grandfather    • Stroke Neg Hx    • Anuerysm Neg Hx    • Clotting disorder Neg Hx    • Heart failure Neg Hx    • Coronary artery disease Neg Hx        Current Medications       Current Outpatient Medications:   •  aspirin 81 mg chewable tablet, Chew 1 tablet (81 mg total) daily, Disp: 90 tablet, Rfl: 1  •  atorvastatin (LIPITOR) 40 mg tablet, Take 1 tablet (40 mg total) by mouth daily, Disp: 90 tablet, Rfl: 3  •  Blood Glucose Monitoring Suppl (OneTouch Verio Reflect) w/Device KIT, May substitute brand based on insurance coverage. Check glucose ACHS. , Disp: 1 kit, Rfl: 0  •  calcium citrate (CALCITRATE) 950 MG tablet, Take 1 tablet by mouth daily, Disp: , Rfl:   •  cholecalciferol (VITAMIN D3) 1,000 units tablet, Take 1,000 Units by mouth daily, Disp: , Rfl:   •  clopidogrel (PLAVIX) 75 mg tablet, Take 1 tablet (75 mg total) by mouth daily, Disp: 90 tablet, Rfl: 4  •  cyanocobalamin 100 MCG tablet, Take 100 mcg by mouth daily, Disp: , Rfl:   •  Empagliflozin (Jardiance) 10 MG TABS tablet, Take 1 tablet (10 mg total) by mouth every morning, Disp: 90 tablet, Rfl: 2  •  glucose blood (FREESTYLE LITE) test strip, Use as instructed, Disp: 300 strip, Rfl: 3  •  insulin degludec Benjamen Cart FlexTouch) 100 units/mL injection pen, 10 units daily. Dose change, Disp: 15 mL, Rfl: 0  •  Insulin Pen Needle (BD Pen Needle Trudy 2nd Gen) 32G X 4 MM MISC, For use with insulin pen. Pharmacy may dispense brand covered by insurance., Disp: 300 each, Rfl: 3  •  Lancets (freestyle) lancets, Use as instructed, Disp: 300 each, Rfl: 3  •  metFORMIN (GLUCOPHAGE) 500 mg tablet, 1 tab bid, Disp: 180 tablet, Rfl: 1  •  metoprolol succinate (TOPROL-XL) 25 mg 24 hr tablet, TAKE ONE TABLET BY MOUTH ONCE DAILY, Disp: 90 tablet, Rfl: 0  •  multivitamin (THERAGRAN) TABS, Take 1 tablet by mouth daily, Disp: , Rfl:   •  glucose blood (OneTouch Verio) test strip, May substitute brand based on insurance coverage. Check glucose ACHS. (Patient not taking: Reported on 7/21/2023), Disp: 200 each, Rfl: 0  •  lisinopril-hydrochlorothiazide (PRINZIDE,ZESTORETIC) 20-25 MG per tablet, Take 0.5 tablets by mouth daily Do not start before June 6, 2023. (Patient not taking: Reported on 8/3/2023), Disp: , Rfl: 0  •  OneTouch Delica Lancets 19L MISC, May substitute brand based on insurance coverage. Check glucose ACHS.  (Patient not taking: Reported on 7/21/2023), Disp: 200 each, Rfl: 0     Allergies     Allergies   Allergen Reactions   • Other Other (See Comments)     No Nsaids due to weight loss surgery        Objective     /72 (BP Location: Left arm, Patient Position: Sitting, Cuff Size: Adult)   Pulse 87   Temp 98.1 °F (36.7 °C)   Resp 16   Ht 5' 8" (1.727 m)   Wt 92.5 kg (204 lb)   SpO2 98%   BMI 31.02 kg/m²      Physical Exam  Vitals reviewed. Constitutional:       General: She is not in acute distress. Appearance: She is obese. HENT:      Head: Normocephalic and atraumatic. Right Ear: External ear normal.      Left Ear: External ear normal.      Nose: Nose normal.      Mouth/Throat:      Mouth: Mucous membranes are moist.      Pharynx: Oropharynx is clear. Eyes:      Extraocular Movements: Extraocular movements intact. Conjunctiva/sclera: Conjunctivae normal.      Pupils: Pupils are equal, round, and reactive to light. Cardiovascular:      Rate and Rhythm: Normal rate and regular rhythm. Pulses: Normal pulses. Heart sounds: Normal heart sounds. Pulmonary:      Effort: Pulmonary effort is normal.      Breath sounds: Normal breath sounds. Abdominal:      General: Abdomen is flat. Bowel sounds are normal.      Palpations: Abdomen is soft. Tenderness: There is no abdominal tenderness. Musculoskeletal:      Cervical back: Neck supple. Right lower leg: No edema. Left lower leg: No edema. Skin:     General: Skin is warm and dry. Neurological:      Mental Status: She is alert and oriented to person, place, and time. Psychiatric:         Mood and Affect: Mood normal.         Thought Content:  Thought content normal.               Abeba Hobbs,   [id number] [facility]

## 2023-08-04 ENCOUNTER — CLINICAL SUPPORT (OUTPATIENT)
Dept: CARDIAC REHAB | Facility: CLINIC | Age: 63
End: 2023-08-04
Payer: COMMERCIAL

## 2023-08-04 DIAGNOSIS — Z95.5 STATUS POST INSERTION OF DRUG ELUTING CORONARY ARTERY STENT: Primary | ICD-10-CM

## 2023-08-04 PROCEDURE — 93798 PHYS/QHP OP CAR RHAB W/ECG: CPT

## 2023-08-08 ENCOUNTER — CLINICAL SUPPORT (OUTPATIENT)
Dept: CARDIAC REHAB | Facility: CLINIC | Age: 63
End: 2023-08-08
Payer: COMMERCIAL

## 2023-08-08 DIAGNOSIS — Z95.5 STATUS POST INSERTION OF DRUG ELUTING CORONARY ARTERY STENT: Primary | ICD-10-CM

## 2023-08-08 PROCEDURE — 93798 PHYS/QHP OP CAR RHAB W/ECG: CPT

## 2023-08-10 ENCOUNTER — CLINICAL SUPPORT (OUTPATIENT)
Dept: CARDIAC REHAB | Facility: CLINIC | Age: 63
End: 2023-08-10
Payer: COMMERCIAL

## 2023-08-10 DIAGNOSIS — Z95.5 STATUS POST INSERTION OF DRUG ELUTING CORONARY ARTERY STENT: Primary | ICD-10-CM

## 2023-08-10 PROCEDURE — 93798 PHYS/QHP OP CAR RHAB W/ECG: CPT

## 2023-08-11 ENCOUNTER — APPOINTMENT (OUTPATIENT)
Dept: CARDIAC REHAB | Facility: CLINIC | Age: 63
End: 2023-08-11
Payer: COMMERCIAL

## 2023-08-15 ENCOUNTER — CLINICAL SUPPORT (OUTPATIENT)
Dept: CARDIAC REHAB | Facility: CLINIC | Age: 63
End: 2023-08-15
Payer: COMMERCIAL

## 2023-08-15 DIAGNOSIS — Z95.5 STATUS POST INSERTION OF DRUG ELUTING CORONARY ARTERY STENT: Primary | ICD-10-CM

## 2023-08-15 PROCEDURE — 93798 PHYS/QHP OP CAR RHAB W/ECG: CPT

## 2023-08-17 ENCOUNTER — APPOINTMENT (OUTPATIENT)
Dept: CARDIAC REHAB | Facility: CLINIC | Age: 63
End: 2023-08-17
Payer: COMMERCIAL

## 2023-08-18 ENCOUNTER — CLINICAL SUPPORT (OUTPATIENT)
Dept: CARDIAC REHAB | Facility: CLINIC | Age: 63
End: 2023-08-18
Payer: COMMERCIAL

## 2023-08-18 DIAGNOSIS — Z95.5 STATUS POST INSERTION OF DRUG ELUTING CORONARY ARTERY STENT: Primary | ICD-10-CM

## 2023-08-18 PROCEDURE — 93798 PHYS/QHP OP CAR RHAB W/ECG: CPT

## 2023-08-18 NOTE — PROGRESS NOTES
Cardiac Rehabilitation Plan of Care   30 Day Reassessment          Today's date: 2023   # of Exercise Sessions Completed: 12  Patient name: Ashok Matias      : 1960  Age: 57 y.o. MRN: [id number]  Referring Physician: Fermin Ding, *  Cardiologist: Gosia Casas MD   Provider: Minnie Mayes  Clinician: Sagar Coles MS     Dx:   Encounter Diagnosis   Name Primary? • Status post insertion of drug eluting coronary artery stent Yes     Date of onset: 23      SUMMARY OF PROGRESS: Kayy Ordonez is compliant attending cardiac rehab exercise sessions 3x/wk post DESx1 to mLAD (95% stenosis). Patient had CP for few days leading up to going to ED. Sent for outpatient Stress Echo on 23. Came back positive for Ischemia with ST depression. Cardiac cath Scheduled  with noted stenosis at mLAD (stented), pLAD (30%), and RPDA (45%). Patient's S/S have improved since. Hx of uncontrolled T2D (working with endo), HTN, HDL, PAF (), Bariatric surgery. She tolerates 30-45 mins at 2.0 - 3.7 METs. A light strength training component has not been added to their exercise program. and will be added in a future exercise session. She is tolerating progression of intensity levels to maintain RPE 4-6. Resting BP  98/58 - 134/74 with Normal response to exercise reaching 128/80- 152/78. NSR on tele. RHR 82 - 100  with Normal response to exercise reaching 111 - 118. She has not added home exercise, she states taking her dogs for walks but has not added any formal exercise. She has bike she hopes to start using soon! No cardiac complaints. She is progressing toward wt loss goals with a loss of 1 pounds. Patient has been working on  dietary modifications with the goal of rare red/processed meats, low fat dairy, reduced added sugars and refined flours. The patient has T2D, Patient monitors BS 1 times/day, Patient reported fasting -105, this morning it was 125  The patient is a non-smoker.  PHQ-9 and MAGED-7 were not reassessed due to initial scores and patient maintaining emotional health. Most recent assessment found PHQ-9 at a 1 suggesting 1-4 = Minimal Depression and MAGED-7 at a 0 suggesting 0-4  = Not anxious. When addressed, the patient denies depression/anxiety. Patient reports excellent social/emotional support from her . Patient attends group educational classes on cardiac risk factor modification. Her exercise program will be progressed as tolerated to maintain RPE 4-6. The patient has the following personal goals she hopes to achieved by discharge: start exercising on opposite days of rehab, watch sodium intake, and overall increase stamina and strength. They will continue to be educated on lifestyle modification and encouraged to supplement with a home exercise program as tolerated to reach the following goals in the next 30 days: attend rehab regularly, increase durations and intensities as tolerated on different exercise machines.       Medication compliance: Yes   Comments: Pt reports to be compliant with medications  Fall Risk: Low   Comments: Ambulates with a steady gait with no assist device and Denies a fall in the past 6 months    EKG Interpretation: NSR      EXERCISE ASSESSMENT and PLAN    Exercise Prescription:      Frequency: 3 days/week   Supplement with home exercise 2+ days/wk as tolerated       Minutes: 30-45         METS: 2.0-3.7            HR: 111-118   RPE: 3-6         Modalities: Treadmill, UBE, Lifecycle and NuStep      30 Day Goals for Exercise Progression:    Frequency: 3 days/week of cardiac rehab       Supplement with home exercise 2+ days/wk as tolerated    Minutes: 35-45                              >150 mins/wk of moderate intensity exercise   METS: 3.5-4.5   HR: resting + 390     RPE: 4-6   Modalities: Treadmill, UBE, Lifecycle and NuStep    Strength trainin-3 days / week  12-15 repetitions  1-2 sets per modality   Will be added following 2-3 weeks of monitored exercise sessions   Modalities: Chest Press, Pull Downs, Arm Curl, Seated Row and Sit to [facility] Exercise: none    Goals: 10% improvement in functional capacity - based on max METs achieved in fitness assessment, improved DASI score by 10%, Increase in exercise capacity by 40% - based on peak METs tolerated in cardiac rehab exercise session, Exercise 5 days/wk, >150mins/wk of moderate intensity exercise, Resume ADLs with increased strength, Attend Rehab regularly, Start a walking program and return to regular exercise at the gym    Progression Toward Goals:  Pt is progressing and showing improvement  toward the following goals:  attending rehab regularly, tolerating exercise well.  , Patient will add home exercise, increase strength/endurance, return to all ADLs without S/S in the next 30 days, Will continue to educate and progress as tolerated.     Education: benefit of exercise for CAD risk factors, home exercise guidelines, AHA guidelines to achieve >150 mins/wk of moderate exercise and RPE scale   Plan:education on home exercise guidelines, home exercise 30+ mins 2 days opposite CR and Education class: Risk Factors for Heart Disease  Readiness to change: Action:  (Changing behavior)      NUTRITION ASSESSMENT AND PLAN    Weight control:    Starting weight: 201 lbs    Current weight:   200 lbs  Waist circumference:    Startin.5 in    Current:      Diabetes: T2D  A1c: 8.2    last measured: 23    Lipid management: Discussed diet and lipid management and Last lipid profile 23  Chol 134  TRG 88  HDL 45  LDL 71    Goals:reduced BMI to < 25, decreased body fat % <33%  (F), reduced waist circumference <35 inches (F), Improved Rate Your Plate score  >89, 0.3-3%  wt loss, increase intake of fish, shellfish, increase intake of meatless meals, use low fat dairy, Eat 4-5 cups of fruits and vegetables daily and Increase intake of nuts and seeds    Measurable goals were based Rate Your Plate Dietary Self-Assessment. These are the areas in which the patient could score higher on the assessment. Goals include recommendations for a heart healthy diet based on American Heart Association. Progression Toward Goals: Pt is progressing and showing improvement  toward the following goals:  watching sodium- will focus more on sodium intake this month .  , Patient will continue to follow a heart healthy diet in the next 30 days, Will continue to educate and progress as tolerated. Education: heart healthy eating  low sodium diet  hydration  nutrition for Improved BG control  target goal for A1c <7.0  exercise and diabetes management   Plan: Education class: Reading Food Labels, Education Class: Heart Healthy Eating, switch to low fat cheeses, replace refined grain bread with whole grain bread, replace unhealthy snacks with fruits & vegs, avoid processed foods, remove salt shaker from table, will replace sugar sweetened cereals with whole grain or oatmeal, monitor home blood glucose and keep added daily sugar <25g/day  Readiness to change: Action:  (Changing behavior)      PSYCHOSOCIAL ASSESSMENT AND PLAN    Emotional:  Depression assessment:  PHQ-9 = 1  1-4 = Minimal Depression            Anxiety measure:  MAGED-7 = 0  0-4  = Not anxious  Self-reported stress level:  6  Social support: Excellent and Patient reports excellent emotional/social support from     Goals:  Reduce perceived stress to 1-3/10, improved [facility] QOL < 27, Physical Fitness in [facility] Score < 3, Social Support in [facility] Score < 3, Overall Health in [facility] Score < 3, improved positive thoughts of well being and increased energy    Progression Toward Goals: Pt is progressing and showing improvement  toward the following goals:  maintaining emotional health .  , Patient will maintain emotional health in the next 30 days, Will continue to educate and progress as tolerated.     Education: benefits of a positive support system  Plan: Class: Stress and Your Health, Class: Relaxation, Practice relaxation techniques and Exercise  Readiness to change: Maintenance: (Maintaining the behavior change)      OTHER CORE COMPONENTS     Tobacco:   Social History     Tobacco Use   Smoking Status Former   • Packs/day: 1.00   • Years: 20.00   • Total pack years: 20.00   • Types: Cigarettes   Smokeless Tobacco Never   Tobacco Comments    20 years ago. Tobacco Use Intervention:   N/A: Pt has a remote history of smoking    Anginal Symptoms:  None   NTG use: No prescription    Blood pressure:    Restin//74   Exercise: 128//78    Goals: consistent BP < 130/80, reduced dietary sodium <2300mg, moderate intensity exercise >150 mins/wk, medication compliance, reduce number of medications  needed for BP control and Abstain from smoking    Progression Toward Goals: Pt is progressing and showing improvement  toward the following goals:  hydrating throughout the day, and watching sodium intake .  , Patient will monitor BP at home and abstain from smoking in the next 30 days, Will continue to educate and progress as tolerated.     Education:  understanding high blood pressure and it's relationship to CAD  Plan: Class: Understanding Heart Disease, Class: Common Heart Medications, Avoid Processed foods, engage in regular exercise, check labels for sodium content and monitor home BP  Readiness to change: Action:  (Changing behavior)

## 2023-08-22 ENCOUNTER — CLINICAL SUPPORT (OUTPATIENT)
Dept: CARDIAC REHAB | Facility: CLINIC | Age: 63
End: 2023-08-22
Payer: COMMERCIAL

## 2023-08-22 DIAGNOSIS — Z95.5 STATUS POST INSERTION OF DRUG ELUTING CORONARY ARTERY STENT: Primary | ICD-10-CM

## 2023-08-22 PROCEDURE — 93798 PHYS/QHP OP CAR RHAB W/ECG: CPT

## 2023-08-24 ENCOUNTER — CLINICAL SUPPORT (OUTPATIENT)
Dept: CARDIAC REHAB | Facility: CLINIC | Age: 63
End: 2023-08-24
Payer: COMMERCIAL

## 2023-08-24 DIAGNOSIS — Z95.5 STATUS POST INSERTION OF DRUG ELUTING CORONARY ARTERY STENT: Primary | ICD-10-CM

## 2023-08-24 PROCEDURE — 93798 PHYS/QHP OP CAR RHAB W/ECG: CPT

## 2023-08-25 ENCOUNTER — CLINICAL SUPPORT (OUTPATIENT)
Dept: CARDIAC REHAB | Facility: CLINIC | Age: 63
End: 2023-08-25
Payer: COMMERCIAL

## 2023-08-25 DIAGNOSIS — Z95.5 STATUS POST INSERTION OF DRUG ELUTING CORONARY ARTERY STENT: Primary | ICD-10-CM

## 2023-08-25 PROCEDURE — 93798 PHYS/QHP OP CAR RHAB W/ECG: CPT

## 2023-08-26 DIAGNOSIS — I42.9 CARDIOMYOPATHY (HCC): ICD-10-CM

## 2023-08-28 RX ORDER — METOPROLOL SUCCINATE 25 MG/1
TABLET, EXTENDED RELEASE ORAL
Qty: 90 TABLET | Refills: 0 | Status: SHIPPED | OUTPATIENT
Start: 2023-08-28

## 2023-08-29 ENCOUNTER — CLINICAL SUPPORT (OUTPATIENT)
Dept: CARDIAC REHAB | Facility: CLINIC | Age: 63
End: 2023-08-29
Payer: COMMERCIAL

## 2023-08-29 DIAGNOSIS — Z95.5 STATUS POST INSERTION OF DRUG ELUTING CORONARY ARTERY STENT: Primary | ICD-10-CM

## 2023-08-29 PROCEDURE — 93798 PHYS/QHP OP CAR RHAB W/ECG: CPT

## 2023-08-31 ENCOUNTER — APPOINTMENT (OUTPATIENT)
Dept: CARDIAC REHAB | Facility: CLINIC | Age: 63
End: 2023-08-31
Payer: COMMERCIAL

## 2023-09-01 ENCOUNTER — APPOINTMENT (OUTPATIENT)
Dept: CARDIAC REHAB | Facility: CLINIC | Age: 63
End: 2023-09-01
Payer: COMMERCIAL

## 2023-09-05 ENCOUNTER — CLINICAL SUPPORT (OUTPATIENT)
Dept: CARDIAC REHAB | Facility: CLINIC | Age: 63
End: 2023-09-05
Payer: COMMERCIAL

## 2023-09-05 DIAGNOSIS — Z95.5 STATUS POST INSERTION OF DRUG ELUTING CORONARY ARTERY STENT: Primary | ICD-10-CM

## 2023-09-05 PROCEDURE — 93798 PHYS/QHP OP CAR RHAB W/ECG: CPT

## 2023-09-07 ENCOUNTER — CLINICAL SUPPORT (OUTPATIENT)
Dept: CARDIAC REHAB | Facility: CLINIC | Age: 63
End: 2023-09-07
Payer: COMMERCIAL

## 2023-09-07 DIAGNOSIS — Z95.5 STATUS POST INSERTION OF DRUG ELUTING CORONARY ARTERY STENT: Primary | ICD-10-CM

## 2023-09-07 PROCEDURE — 93798 PHYS/QHP OP CAR RHAB W/ECG: CPT

## 2023-09-08 ENCOUNTER — CLINICAL SUPPORT (OUTPATIENT)
Dept: CARDIAC REHAB | Facility: CLINIC | Age: 63
End: 2023-09-08
Payer: COMMERCIAL

## 2023-09-08 DIAGNOSIS — Z95.5 STATUS POST INSERTION OF DRUG ELUTING CORONARY ARTERY STENT: Primary | ICD-10-CM

## 2023-09-08 PROCEDURE — 93798 PHYS/QHP OP CAR RHAB W/ECG: CPT

## 2023-09-12 ENCOUNTER — CLINICAL SUPPORT (OUTPATIENT)
Dept: CARDIAC REHAB | Facility: CLINIC | Age: 63
End: 2023-09-12
Payer: COMMERCIAL

## 2023-09-12 DIAGNOSIS — Z95.5 STATUS POST INSERTION OF DRUG ELUTING CORONARY ARTERY STENT: Primary | ICD-10-CM

## 2023-09-12 PROCEDURE — 93798 PHYS/QHP OP CAR RHAB W/ECG: CPT

## 2023-09-14 ENCOUNTER — CLINICAL SUPPORT (OUTPATIENT)
Dept: CARDIAC REHAB | Facility: CLINIC | Age: 63
End: 2023-09-14
Payer: COMMERCIAL

## 2023-09-14 DIAGNOSIS — Z95.5 STATUS POST INSERTION OF DRUG ELUTING CORONARY ARTERY STENT: Primary | ICD-10-CM

## 2023-09-14 PROCEDURE — 93798 PHYS/QHP OP CAR RHAB W/ECG: CPT

## 2023-09-14 NOTE — PROGRESS NOTES
Cardiac Rehabilitation Plan of Care   60 Day Reassessment          Today's date: 2023   # of Exercise Sessions Completed: 21  Patient name: Stormy Perdomo      : 1960  Age: 70 y.o. MRN: [id number]  Referring Physician: Maged Hay, *  Cardiologist: Stan Moody MD   Provider: [facility]  Clinician: Anni Wright, MS, CEP, [facility]     Dx:   Encounter Diagnosis   Name Primary? • Status post insertion of drug eluting coronary artery stent Yes     Date of onset: 23      SUMMARY OF PROGRESS: Neli Funes is compliant attending cardiac rehab exercise sessions 3x/wk post DESx1 to mLAD (95% stenosis). Patient had CP for few days leading up to going to ED. Sent for outpatient Stress Echo on 23. Came back positive for Ischemia with ST depression. Cardiac cath Scheduled  with noted stenosis at mLAD (stented), pLAD (30%), and RPDA (45%). Patient's S/S have improved since. Hx of uncontrolled T2D (working with endo), HTN, HDL, PAF (), Bariatric surgery. She tolerates 40 mins at 3.2 - 3.7 METs. A light strength training component will be added in a future exercise session. She is tolerating progression of intensity levels to maintain RPE 4-6. Resting /60- 120/80 with Normal response to exercise reaching 126/76- 150/74. NSR on tele. RHR 79 - 93  with Normal response to exercise reaching 103 - 113. She is walking her dog and doing training with them. Encouraged patient to do more structured exercise. She is thinking about getting a TM for at home or going to a gym near her house. No cardiac complaints. She states she notices an increase with her stamina and endurance. She is progressing toward wt loss goals with a loss of 2 pounds. Patient has been working on  dietary modifications with the goal of rare red/processed meats, low fat dairy, reduced added sugars and refined flours. The patient has T2D, Patient monitors BS 1 times/day, Patient reported fasting -105.  The patient is a non-smoker. PHQ-9 and MAGED-7 were not reassessed due to initial scores and patient maintaining emotional health. Most recent assessment found PHQ-9 at a 1 suggesting 1-4 = Minimal Depression and MAGED-7 at a 0 suggesting 0-4  = Not anxious. When addressed, the patient denies stress/depression/anxiety. Patient reports excellent social/emotional support from her . Patient attends group educational classes on cardiac risk factor modification. Her exercise program will be progressed as tolerated to maintain RPE 4-6. The patient has the following personal goals she hopes to achieved by discharge: start exercising on opposite days of rehab, watch sodium intake, and overall increase stamina and strength. They will continue to be educated on lifestyle modification and encouraged to supplement with a home exercise program as tolerated to reach the following goals in the next 30 days: add more structured home exercise, look into getting a TM or joining gym.       Medication compliance: Yes   Comments: Pt reports to be compliant with medications  Fall Risk: Low   Comments: Ambulates with a steady gait with no assist device and Denies a fall in the past 6 months    EKG Interpretation: NSR      EXERCISE ASSESSMENT and PLAN    Exercise Prescription:      Frequency: 3 days/week   Supplement with home exercise 2+ days/wk as tolerated       Minutes: 40         METS: 3.2-3.7            HR: 103-113   RPE: 3-6         Modalities: Treadmill, UBE, Lifecycle and NuStep      30 Day Goals for Exercise Progression:    Frequency: 3 days/week of cardiac rehab       Supplement with home exercise 2+ days/wk as tolerated    Minutes: 40-45                              >150 mins/wk of moderate intensity exercise   METS: 3.5-4.5   HR: resting + 30     RPE: 4-6   Modalities: Treadmill, UBE, Lifecycle and NuStep    Strength trainin-3 days / week  12-15 repetitions  1-2 sets per modality   Will be added following 2-3 weeks of monitored exercise sessions   Modalities: Chest Press, Pull Downs, Arm Curl, Seated Row and Sit to [facility] Exercise: walking her dog few days/week    Goals: 10% improvement in functional capacity - based on max METs achieved in fitness assessment, improved DASI score by 10%, Increase in exercise capacity by 40% - based on peak METs tolerated in cardiac rehab exercise session, Exercise 5 days/wk, >150mins/wk of moderate intensity exercise, Resume ADLs with increased strength, Attend Rehab regularly, Start a walking program and return to regular exercise at the gym    Progression Toward Goals:  Pt is progressing and showing improvement  toward the following goals:  attending rehab regularly, tolerating exercise well.  , Pt has not made progress toward the following goals: home exercise. , Patient will look into getting a TM or joining the gym in the next 30 days, Will continue to educate and progress as tolerated.     Education: benefit of exercise for CAD risk factors, home exercise guidelines, AHA guidelines to achieve >150 mins/wk of moderate exercise, RPE scale, class: Risk Factors for Heart Disease and physical activity/exercise in extreme weather conditions   Plan:education on home exercise guidelines and home exercise 30+ mins 2 days opposite CR  Readiness to change: Action:  (Changing behavior)      NUTRITION ASSESSMENT AND PLAN    Weight control:    Starting weight: 201 lbs    Current weight:   198.6 lbs  Waist circumference:    Startin.5 in    Current:      Diabetes: T2D  A1c: 8.2    last measured: 23    Lipid management: Discussed diet and lipid management and Last lipid profile 23  Chol 134  TRG 88  HDL 45  LDL 71    Goals:reduced BMI to < 25, decreased body fat % <33%  (F), reduced waist circumference <35 inches (F), Improved Rate Your Plate score  >81, 7.4-3%  wt loss, increase intake of fish, shellfish, increase intake of meatless meals, use low fat dairy, Eat 4-5 cups of fruits and vegetables daily and Increase intake of nuts and seeds    Measurable goals were based Rate Your Plate Dietary Self-Assessment. These are the areas in which the patient could score higher on the assessment. Goals include recommendations for a heart healthy diet based on American Heart Association. Progression Toward Goals: Pt is progressing and showing improvement  toward the following goals:  weight loss of 2 lbs and making dietary changes. , Patient will continue to follow a heart healthy diet in the next 30 days, Will continue to educate and progress as tolerated. Education: heart healthy eating  low sodium diet  hydration  nutrition for Improved BG control  target goal for A1c <7.0  exercise and diabetes management   education class:  Label Reading  Plan: Education Class: Heart Healthy Eating, switch to low fat cheeses, replace refined grain bread with whole grain bread, replace unhealthy snacks with fruits & vegs, avoid processed foods, remove salt shaker from table, will replace sugar sweetened cereals with whole grain or oatmeal, monitor home blood glucose and keep added daily sugar <25g/day  Readiness to change: Action:  (Changing behavior)      PSYCHOSOCIAL ASSESSMENT AND PLAN    Emotional:  Depression assessment:  PHQ-9 = 1  1-4 = Minimal Depression            Anxiety measure:  MAGED-7 = 0  0-4  = Not anxious  Self-reported stress level:  1  Social support: Excellent and Patient reports excellent emotional/social support from     Goals:  Reduce perceived stress to 1-3/10, improved [place] QOL < 27, Physical Fitness in [place] Score < 3, Social Support in [place] Score < 3, Overall Health in [facility] Score < 3, improved positive thoughts of well being and increased energy    Progression Toward Goals: Pt is progressing and showing improvement  toward the following goals:  deniess stress/anxiety/depression.   , Patient will maintain emotional health in the next 30 days, Will continue to educate and progress as tolerated. Education: benefits of a positive support system, class:  Stress and Your Health  and class:  Relaxation  Plan: Practice relaxation techniques, Exercise and Keep a positive mindset  Readiness to change: Action:  (Changing behavior)      OTHER CORE COMPONENTS     Tobacco:   Social History     Tobacco Use   Smoking Status Former   • Packs/day: 1.00   • Years: 20.00   • Total pack years: 20.00   • Types: Cigarettes   Smokeless Tobacco Never   Tobacco Comments    20 years ago. Tobacco Use Intervention:   N/A: Pt has a remote history of smoking    Anginal Symptoms:  None   NTG use: No prescription    Blood pressure:    Restin/60- 120/80   Exercise: 126/78- 150/74    Goals: consistent BP < 130/80, reduced dietary sodium <2300mg, moderate intensity exercise >150 mins/wk, medication compliance, reduce number of medications  needed for BP control and Abstain from smoking    Progression Toward Goals: Pt is progressing and showing improvement  toward the following goals:  BP stable, staying hydrated. , Patient will maintain BP in the next 30 days, Will continue to educate and progress as tolerated.     Education:  understanding high blood pressure and it's relationship to CAD and Education class:  Common Heart Medications  Plan: Class: Understanding Heart Disease, Avoid Processed foods, engage in regular exercise, check labels for sodium content and monitor home BP  Readiness to change: Action:  (Changing behavior)

## 2023-09-15 ENCOUNTER — CLINICAL SUPPORT (OUTPATIENT)
Dept: CARDIAC REHAB | Facility: CLINIC | Age: 63
End: 2023-09-15
Payer: COMMERCIAL

## 2023-09-15 DIAGNOSIS — Z95.5 STATUS POST INSERTION OF DRUG ELUTING CORONARY ARTERY STENT: Primary | ICD-10-CM

## 2023-09-15 PROCEDURE — 93798 PHYS/QHP OP CAR RHAB W/ECG: CPT

## 2023-09-19 ENCOUNTER — CLINICAL SUPPORT (OUTPATIENT)
Dept: CARDIAC REHAB | Facility: CLINIC | Age: 63
End: 2023-09-19
Payer: COMMERCIAL

## 2023-09-19 DIAGNOSIS — Z95.5 STATUS POST INSERTION OF DRUG ELUTING CORONARY ARTERY STENT: Primary | ICD-10-CM

## 2023-09-19 PROCEDURE — 93798 PHYS/QHP OP CAR RHAB W/ECG: CPT

## 2023-09-21 ENCOUNTER — CLINICAL SUPPORT (OUTPATIENT)
Dept: CARDIAC REHAB | Facility: CLINIC | Age: 63
End: 2023-09-21
Payer: COMMERCIAL

## 2023-09-21 DIAGNOSIS — Z95.5 STATUS POST INSERTION OF DRUG ELUTING CORONARY ARTERY STENT: Primary | ICD-10-CM

## 2023-09-21 PROCEDURE — 93798 PHYS/QHP OP CAR RHAB W/ECG: CPT

## 2023-09-22 ENCOUNTER — CLINICAL SUPPORT (OUTPATIENT)
Dept: CARDIAC REHAB | Facility: CLINIC | Age: 63
End: 2023-09-22
Payer: COMMERCIAL

## 2023-09-22 DIAGNOSIS — Z95.5 STATUS POST INSERTION OF DRUG ELUTING CORONARY ARTERY STENT: Primary | ICD-10-CM

## 2023-09-22 PROCEDURE — 93798 PHYS/QHP OP CAR RHAB W/ECG: CPT

## 2023-09-26 ENCOUNTER — CLINICAL SUPPORT (OUTPATIENT)
Dept: CARDIAC REHAB | Facility: CLINIC | Age: 63
End: 2023-09-26
Payer: COMMERCIAL

## 2023-09-26 DIAGNOSIS — Z95.5 STATUS POST INSERTION OF DRUG ELUTING CORONARY ARTERY STENT: Primary | ICD-10-CM

## 2023-09-26 PROCEDURE — 93798 PHYS/QHP OP CAR RHAB W/ECG: CPT

## 2023-09-28 ENCOUNTER — CLINICAL SUPPORT (OUTPATIENT)
Dept: CARDIAC REHAB | Facility: CLINIC | Age: 63
End: 2023-09-28
Payer: COMMERCIAL

## 2023-09-28 DIAGNOSIS — Z95.5 STATUS POST INSERTION OF DRUG ELUTING CORONARY ARTERY STENT: Primary | ICD-10-CM

## 2023-09-28 PROCEDURE — 93798 PHYS/QHP OP CAR RHAB W/ECG: CPT

## 2023-09-29 ENCOUNTER — CLINICAL SUPPORT (OUTPATIENT)
Dept: CARDIAC REHAB | Facility: CLINIC | Age: 63
End: 2023-09-29
Payer: COMMERCIAL

## 2023-09-29 DIAGNOSIS — Z95.5 STATUS POST INSERTION OF DRUG ELUTING CORONARY ARTERY STENT: Primary | ICD-10-CM

## 2023-09-29 PROCEDURE — 93798 PHYS/QHP OP CAR RHAB W/ECG: CPT

## 2023-10-03 ENCOUNTER — CLINICAL SUPPORT (OUTPATIENT)
Dept: CARDIAC REHAB | Facility: CLINIC | Age: 63
End: 2023-10-03
Payer: COMMERCIAL

## 2023-10-03 DIAGNOSIS — Z95.5 STATUS POST INSERTION OF DRUG ELUTING CORONARY ARTERY STENT: Primary | ICD-10-CM

## 2023-10-03 PROCEDURE — 93798 PHYS/QHP OP CAR RHAB W/ECG: CPT

## 2023-10-05 ENCOUNTER — CLINICAL SUPPORT (OUTPATIENT)
Dept: CARDIAC REHAB | Facility: CLINIC | Age: 63
End: 2023-10-05
Payer: COMMERCIAL

## 2023-10-05 DIAGNOSIS — Z95.5 STATUS POST INSERTION OF DRUG ELUTING CORONARY ARTERY STENT: Primary | ICD-10-CM

## 2023-10-05 PROCEDURE — 93798 PHYS/QHP OP CAR RHAB W/ECG: CPT

## 2023-10-06 ENCOUNTER — CLINICAL SUPPORT (OUTPATIENT)
Dept: CARDIAC REHAB | Facility: CLINIC | Age: 63
End: 2023-10-06
Payer: COMMERCIAL

## 2023-10-06 DIAGNOSIS — Z95.5 STATUS POST INSERTION OF DRUG ELUTING CORONARY ARTERY STENT: Primary | ICD-10-CM

## 2023-10-06 PROCEDURE — 93798 PHYS/QHP OP CAR RHAB W/ECG: CPT

## 2023-10-10 ENCOUNTER — CLINICAL SUPPORT (OUTPATIENT)
Dept: CARDIAC REHAB | Facility: CLINIC | Age: 63
End: 2023-10-10
Payer: COMMERCIAL

## 2023-10-10 DIAGNOSIS — Z95.5 STATUS POST INSERTION OF DRUG ELUTING CORONARY ARTERY STENT: Primary | ICD-10-CM

## 2023-10-10 PROCEDURE — 93798 PHYS/QHP OP CAR RHAB W/ECG: CPT

## 2023-10-12 ENCOUNTER — CLINICAL SUPPORT (OUTPATIENT)
Dept: CARDIAC REHAB | Facility: CLINIC | Age: 63
End: 2023-10-12
Payer: COMMERCIAL

## 2023-10-12 ENCOUNTER — OFFICE VISIT (OUTPATIENT)
Dept: CARDIOLOGY CLINIC | Facility: CLINIC | Age: 63
End: 2023-10-12
Payer: COMMERCIAL

## 2023-10-12 VITALS
SYSTOLIC BLOOD PRESSURE: 122 MMHG | HEART RATE: 89 BPM | DIASTOLIC BLOOD PRESSURE: 68 MMHG | OXYGEN SATURATION: 99 % | WEIGHT: 195.5 LBS | HEIGHT: 68 IN | BODY MASS INDEX: 29.63 KG/M2

## 2023-10-12 DIAGNOSIS — I10 BENIGN ESSENTIAL HYPERTENSION: ICD-10-CM

## 2023-10-12 DIAGNOSIS — E78.00 PURE HYPERCHOLESTEROLEMIA: ICD-10-CM

## 2023-10-12 DIAGNOSIS — Z95.5 STATUS POST INSERTION OF DRUG ELUTING CORONARY ARTERY STENT: Primary | ICD-10-CM

## 2023-10-12 DIAGNOSIS — I25.10 CORONARY ARTERY DISEASE INVOLVING NATIVE CORONARY ARTERY OF NATIVE HEART WITHOUT ANGINA PECTORIS: ICD-10-CM

## 2023-10-12 DIAGNOSIS — I48.0 PAROXYSMAL ATRIAL FIBRILLATION (HCC): Primary | ICD-10-CM

## 2023-10-12 DIAGNOSIS — E11.65 TYPE 2 DIABETES MELLITUS WITH HYPERGLYCEMIA, WITHOUT LONG-TERM CURRENT USE OF INSULIN (HCC): ICD-10-CM

## 2023-10-12 DIAGNOSIS — Z95.5 STATUS POST INSERTION OF DRUG ELUTING CORONARY ARTERY STENT: ICD-10-CM

## 2023-10-12 PROCEDURE — 93798 PHYS/QHP OP CAR RHAB W/ECG: CPT

## 2023-10-12 PROCEDURE — 99214 OFFICE O/P EST MOD 30 MIN: CPT | Performed by: INTERNAL MEDICINE

## 2023-10-12 RX ORDER — LISINOPRIL 5 MG/1
5 TABLET ORAL DAILY
Qty: 90 TABLET | Refills: 3 | Status: SHIPPED | OUTPATIENT
Start: 2023-10-12

## 2023-10-12 NOTE — PROGRESS NOTES
[facility]  Follow up note  Nargis Singh 70 y.o. female MRN: [id number]        Problems    1. Paroxysmal atrial fibrillation (HCC)        2. Coronary artery disease involving native coronary artery of native heart without angina pectoris        3. Pure hypercholesterolemia        4. Benign essential hypertension  lisinopril (ZESTRIL) 5 mg tablet    Basic metabolic panel      5. Status post insertion of drug eluting coronary artery stent        6. Type 2 diabetes mellitus with hyperglycemia, without long-term current use of insulin (HCC)            Impression:    Coronary artery disease  95% mid LAD tandem lesions status post drug-eluting stent x1 6/23 after an ER visit for chest pain  Continues on aspirin and Plavix, tolerates it well  No current cardiac symptoms and doing well at cardiac rehab    Diabetes  A1c has come down from 14-10 and subsequently 8.2, but her sugars themselves are actually much more improved and she suspects and even lower A1c at next assessment next month. She sees her endocrinologist in December  She was placed on Jardiance, tolerating it well  Her weight is down to 195 pounds    PAF  Only remote recurrence in the presence of morbid obesity prior to bariatric surgery    Lipids  Well-controlled with improved LDL down to 70 with lifestyle modification, 20 pound weight loss, in addition to her chronic atorvastatin 40 mg.     Obesity  She has lost 20 pounds in 3 months of cardiac rehab with improvement in her eating habits    HTN  Well-controlled despite being off of lisinopril HCTZ which was discontinued over the summer when [facility] Avers was started      Plan:    Resume lisinopril 5 mg from a renal/diabetic protection standpoint  Continue aspirin/Plavix, uninterrupted, will likely discontinue Plavix 6/24  6-month follow-up recommended, routine blood work being done by her endocrinologist    HPI:   Nargis Singh is a 70y.o. year old female with a history of severe obesity status post bariatric surgery remotely, a remote history of PAF in the setting of obesity, without any recurrence, hypertension and hyperlipidemia, severely uncontrolled diabetes earlier this year with an A1c up to 14, resulting in rapid progression of cardiac symptoms/chest pain proceeding to an abnormal stress echo and referred for cardiac catheterization with discovery of 95% tandem lesions in the LAD status post drug-eluting stent x1, and stable on aspirin and Plavix at this time without any significant issues with bruising or bleeding. She is almost completed cardiac rehab. She is doing well. She has no exertional symptoms. She has lost 20 pounds. She feels good. She is planning on attending the [facility] 3 times a week for exercise after cardiac rehab ends. Lisinopril/HCTZ was discontinued when [facility] was started. She has not developed any significant uncontrolled hypertension. However we did discuss ACE inhibitor therapy from a renal protective standpoint in the setting of diabetes. Her A1c was down to 8.2 but her sugars are even lower at this time suggesting her A1c at follow-up next month will probably be even lower. She is in good spirits. Review of Systems   Constitutional:  Negative for appetite change, diaphoresis, fatigue and fever. Respiratory:  Negative for chest tightness, shortness of breath and wheezing. Cardiovascular:  Negative for chest pain, palpitations and leg swelling. Gastrointestinal:  Negative for abdominal pain and blood in stool. Musculoskeletal:  Negative for arthralgias and joint swelling. Skin:  Negative for rash. Neurological:  Negative for dizziness, syncope and light-headedness.          Past Medical History:   Diagnosis Date   • Diabetes mellitus ([address]) 4 2023   • Hypertension    • Obesity     Had weight trell surgery     Social History     Substance and Sexual Activity   Alcohol Use Never     Social History     Substance and Sexual Activity   Drug Use Never Social History     Tobacco Use   Smoking Status Former   • Packs/day: 1.00   • Years: 20.00   • Total pack years: 20.00   • Types: Cigarettes   Smokeless Tobacco Never   Tobacco Comments    20 years ago. Allergies: Allergies   Allergen Reactions   • Other Other (See Comments)     No Nsaids due to weight loss surgery        Medications:     Current Outpatient Medications:   •  aspirin 81 mg chewable tablet, Chew 1 tablet (81 mg total) daily, Disp: 90 tablet, Rfl: 1  •  atorvastatin (LIPITOR) 40 mg tablet, Take 1 tablet (40 mg total) by mouth daily, Disp: 90 tablet, Rfl: 3  •  Blood Glucose Monitoring Suppl (OneTouch Verio Reflect) w/Device KIT, May substitute brand based on insurance coverage. Check glucose ACHS. , Disp: 1 kit, Rfl: 0  •  calcium citrate (CALCITRATE) 950 MG tablet, Take 1 tablet by mouth daily, Disp: , Rfl:   •  cholecalciferol (VITAMIN D3) 1,000 units tablet, Take 1,000 Units by mouth daily, Disp: , Rfl:   •  clopidogrel (PLAVIX) 75 mg tablet, Take 1 tablet (75 mg total) by mouth daily, Disp: 90 tablet, Rfl: 4  •  cyanocobalamin 100 MCG tablet, Take 100 mcg by mouth daily, Disp: , Rfl:   •  Empagliflozin (Jardiance) 10 MG TABS tablet, Take 1 tablet (10 mg total) by mouth every morning, Disp: 90 tablet, Rfl: 2  •  glucose blood (FREESTYLE LITE) test strip, Use as instructed, Disp: 300 strip, Rfl: 3  •  insulin degludec Maurice Ashlee FlexTouch) 100 units/mL injection pen, 10 units daily. Dose change, Disp: 15 mL, Rfl: 0  •  Insulin Pen Needle (BD Pen Needle Trudy 2nd Gen) 32G X 4 MM MISC, For use with insulin pen.  Pharmacy may dispense brand covered by insurance., Disp: 300 each, Rfl: 3  •  Lancets (freestyle) lancets, Use as instructed, Disp: 300 each, Rfl: 3  •  lisinopril (ZESTRIL) 5 mg tablet, Take 1 tablet (5 mg total) by mouth daily, Disp: 90 tablet, Rfl: 3  •  metFORMIN (GLUCOPHAGE) 500 mg tablet, 1 tab bid, Disp: 180 tablet, Rfl: 1  •  metoprolol succinate (TOPROL-XL) 25 mg 24 hr tablet, TAKE ONE TABLET BY MOUTH ONCE DAILY, Disp: 90 tablet, Rfl: 0  •  multivitamin (THERAGRAN) TABS, Take 1 tablet by mouth daily, Disp: , Rfl:   •  glucose blood (OneTouch Verio) test strip, May substitute brand based on insurance coverage. Check glucose ACHS. (Patient not taking: Reported on 7/21/2023), Disp: 200 each, Rfl: 0  •  OneTouch Delica Lancets 49H MISC, May substitute brand based on insurance coverage. Check glucose ACHS. (Patient not taking: Reported on 7/21/2023), Disp: 200 each, Rfl: 0      Vitals:    10/12/23 0850   BP: 122/68   Pulse: 89   SpO2: 99%     Weight (last 2 days)     Date/Time Weight    10/12/23 0850 88.7 (195.5)        Physical Exam  Constitutional:       General: She is not in acute distress. Appearance: Normal appearance. She is not diaphoretic. HENT:      Head: Normocephalic and atraumatic. Eyes:      General: No scleral icterus. Conjunctiva/sclera: Conjunctivae normal.   Neck:      Vascular: No JVD. Cardiovascular:      Rate and Rhythm: Normal rate and regular rhythm. Heart sounds: Normal heart sounds. No murmur heard. Pulmonary:      Effort: Pulmonary effort is normal. No respiratory distress. Breath sounds: Normal breath sounds. No wheezing, rhonchi or rales. Musculoskeletal:         General: No tenderness. Right lower leg: Normal. No edema. Left lower leg: Normal. No edema. Skin:     General: Skin is warm and dry. Neurological:      Mental Status: She is alert. Mental status is at baseline. Laboratory Studies:  NT-proBNP: No results for input(s): "NTBNP" in the last 72 hours. Chemistries: No results for input(s): "NA", "K", "CL", "CO2", "BUN" in the last 72 hours.     Invalid input(s): "CREA", "GLU"  Lipid Profile:   Lab Results   Component Value Date    CHOL 149 08/01/2015    CHOL 142 04/25/2015    CHOL 188 09/13/2014     Lab Results   Component Value Date    HDL 45 (L) 07/13/2023    HDL 48 (L) 06/06/2023    HDL 49 (L) 06/05/2023 Lab Results   Component Value Date    LDLCALC 71 07/13/2023    LDLCALC 84 06/06/2023    LDLCALC 79 06/05/2023     Lab Results   Component Value Date    TRIG 88 07/13/2023    TRIG 81 06/06/2023    TRIG 74 06/05/2023     Lab Results   Component Value Date    CHOL 149 08/01/2015    LDLDIRECT 77 06/06/2023    HDL 45 (L) 07/13/2023    HDL 59 08/01/2015    TRIG 88 07/13/2023    TRIG 73 08/01/2015     No results for input(s): "CHOL", "LDLDIRECT", "HDL", "TRIG" in the last 72 hours. Cardiac testing:     EKG reviewed personally:    No results found for this visit on 10/12/23. Stress Test  5/23-personally reviewed-abnormal stress echo with anterior apical ischemia    Cardiac Cath  6/5/23-95% mid LAD status post drug-eluting stent    Anton Newell MD    Portions of the record may have been created with voice recognition software. Occasional wrong word or "sound a like" substitutions may have occurred due to the inherent limitations of voice recognition software. Read the chart carefully and recognize, using context, where substitutions have occurred.

## 2023-10-13 ENCOUNTER — CLINICAL SUPPORT (OUTPATIENT)
Dept: CARDIAC REHAB | Facility: CLINIC | Age: 63
End: 2023-10-13
Payer: COMMERCIAL

## 2023-10-13 DIAGNOSIS — Z95.5 STATUS POST INSERTION OF DRUG ELUTING CORONARY ARTERY STENT: Primary | ICD-10-CM

## 2023-10-13 PROCEDURE — 93798 PHYS/QHP OP CAR RHAB W/ECG: CPT

## 2023-10-17 ENCOUNTER — CLINICAL SUPPORT (OUTPATIENT)
Dept: CARDIAC REHAB | Facility: CLINIC | Age: 63
End: 2023-10-17
Payer: COMMERCIAL

## 2023-10-17 DIAGNOSIS — Z95.5 STATUS POST INSERTION OF DRUG ELUTING CORONARY ARTERY STENT: Primary | ICD-10-CM

## 2023-10-17 PROCEDURE — 93798 PHYS/QHP OP CAR RHAB W/ECG: CPT

## 2023-10-19 ENCOUNTER — CLINICAL SUPPORT (OUTPATIENT)
Dept: CARDIAC REHAB | Facility: CLINIC | Age: 63
End: 2023-10-19
Payer: COMMERCIAL

## 2023-10-19 DIAGNOSIS — Z95.5 STATUS POST INSERTION OF DRUG ELUTING CORONARY ARTERY STENT: Primary | ICD-10-CM

## 2023-10-19 PROCEDURE — 93798 PHYS/QHP OP CAR RHAB W/ECG: CPT

## 2023-10-19 NOTE — PROGRESS NOTES
Cardiac Rehabilitation Plan of Care   Discharge          Today's date: 10/19/2023   # of Exercise Sessions Completed: 36  Patient name: Steph Gómez      : 1960  Age: 70 y.o. MRN: [id number]  Referring Physician: Christopher Robertson, *  Cardiologist: Anton Newell MD   Provider: Francesca  Clinician: Sandra Jaeger MS, CEP      Dx:   Encounter Diagnosis   Name Primary? Status post insertion of drug eluting coronary artery stent Yes     Date of onset: 23      SUMMARY OF PROGRESS: Discharge note for Hussein Agee. She attended post DESx1 to mLAD (95% stenosis). Patient had CP for few days leading up to going to ED. Sent for outpatient Stress Echo on 23. Came back positive for Ischemia with ST depression. Cardiac cath Scheduled  with noted stenosis at mLAD (stented), pLAD (30%), and RPDA (45%). She had 34.9% improvement in functional capacity increasing Her max METs in the submaximal TM ETT  from 4.3 to 5.8 with test termination of RHR +30. Her exercise tolerance (max METs in tolerated in cardiac rehab) increased by 26.7%. She had a 23.8% improvement in the [facility] activity estimated MET level with ADLs and physical activity. Hussein Agee has returned to all hobbies and ADLs without complaints. Her [facility] QOL improved by 29.4%. PHQ-9 score decreased from 1 to 0 suggesting no depression. MAGED-7 maintained at 0 suggesting no anxiety. Her weight decreased by 6 pounds. Rate Your Plate score did not improve dropping from 61 to 54. She reports increased stamina, strength and reduced SOB with activity. Hussein Agee tolerates 40 mins at 3.0 - 3.8 METs plus wt training. NSR on telemetry. RHR 80 - 84, ExHR 106 - 111. Resting /60 - 126/74 with appropriate response to exercise reaching 118/70 - 140/74. All group education classes on cardiac risk factor modification were attended by the patient. [facility] to maintain her stamina and strength.  Encouraged Pt to continue exercise. Frequency: 4-6 days/wk, Intensity: RPE 4-5, Time: 40-50 mins daily, 150-200 mins/wk. Pt was encouraged to continue eating heart healthy. Pt was encouraged to remain compliant with medications and f/u with cardiologist with any cardiac symptoms, medication management and updated lipid profile. Medication compliance: Yes   Comments: Pt reports to be compliant with medications  Fall Risk: Low   Comments: Ambulates with a steady gait with no assist device and Denies a fall in the past 6 months    EKG Interpretation: NSR      EXERCISE ASSESSMENT and PLAN    Exercise Prescription:      Frequency: 3 days/week   Supplement with home exercise 2+ days/wk as tolerated       Minutes: 40         METS: 3.0-3.8           HR: 106 - 111   RPE: 3-6         Modalities: Treadmill, UBE, Lifecycle and NuStep      30 Day Goals for Exercise Progression:    Frequency: 3 days/week of cardiac rehab       Supplement with home exercise 2+ days/wk as tolerated    Minutes: 40-45                              >150 mins/wk of moderate intensity exercise   METS: 3.5-4.5   HR: resting + 30     RPE: 4-6   Modalities: Treadmill, UBE, Lifecycle and NuStep    Strength trainin-3 days / week  12-15 repetitions  1-2 sets per modality   Will be added following 2-3 weeks of monitored exercise sessions   Modalities: Chest Press, Pull Downs, Arm Curl, Seated Row and Sit to [facility] Exercise:  walking her dog few days/week    Goals: 10% improvement in functional capacity - based on max METs achieved in fitness assessment, improved DASI score by 10%, Increase in exercise capacity by 40% - based on peak METs tolerated in cardiac rehab exercise session, Exercise 5 days/wk, >150mins/wk of moderate intensity exercise, Resume ADLs with increased strength, Attend Rehab regularly, Start a walking program and return to regular exercise at the gym    Progression Toward Goals:   Will continue to educate and progress as tolerated., Goals met: increased max and peak METs, returned to all hobbies/ADLs, joined fitness center to maintain exercise., Patient will be encouraged to focus on lifestyle modifications following discharge. Education: benefit of exercise for CAD risk factors, home exercise guidelines, AHA guidelines to achieve >150 mins/wk of moderate exercise, RPE scale, class: Risk Factors for Heart Disease, exercise instructions/guidelines for discharge , and physical activity/exercise in extreme weather conditions   Plan:education on home exercise guidelines  Readiness to change: Maintenance: (Maintaining the behavior change)      NUTRITION ASSESSMENT AND PLAN    Weight control:    Starting weight: 201 lbs    Current weight:   195 lbs  Waist circumference:    Startin.5 in    Current:      Diabetes: T2D  A1c: 8.2    last measured: 23    Lipid management: Discussed diet and lipid management and Last lipid profile 23  Chol 134  TRG 88  HDL 45  LDL 71    Goals:reduced BMI to < 25, decreased body fat % <33%  (F), reduced waist circumference <35 inches (F), Improved Rate Your Plate score  >07, 2.8-3%  wt loss, increase intake of fish, shellfish, increase intake of meatless meals, use low fat dairy, Eat 4-5 cups of fruits and vegetables daily and Increase intake of nuts and seeds    Measurable goals were based Rate Your Plate Dietary Self-Assessment. These are the areas in which the patient could score higher on the assessment. Goals include recommendations for a heart healthy diet based on American Heart Association. Progression Toward Goals: Will continue to educate and progress as tolerated., Goals not met: Rate your plate note improved .  , Goals met: weight loss and good FBG., Patient will be encouraged to focus on lifestyle modifications following discharge.     Education: heart healthy eating  low sodium diet  hydration  nutrition for Improved BG control  target goal for A1c <7.0  exercise and diabetes management   wt. loss education class: Heart Healthy Eating  education class:  Label Reading  Plan: switch to low fat cheeses, replace refined grain bread with whole grain bread, replace unhealthy snacks with fruits & vegs, avoid processed foods, remove salt shaker from table, will replace sugar sweetened cereals with whole grain or oatmeal, monitor home blood glucose, and keep added daily sugar <25g/day  Readiness to change: Maintenance: (Maintaining the behavior change)      PSYCHOSOCIAL ASSESSMENT AND PLAN    Emotional:  Depression assessment:  PHQ-9 = 0  0 =No Depression            Anxiety measure:  MAGED-7 = 0  0-4  = Not anxious  Self-reported stress level:  1  Social support: Excellent and Patient reports excellent emotional/social support from     Goals:  Reduce perceived stress to 1-3/10, improved [facility] QOL < 27, Physical Fitness in [facility] Score < 3, Social Support in [facility] Score < 3, Overall Health in [facility] Score < 3, improved positive thoughts of well being and increased energy    Progression Toward Goals: Will continue to educate and progress as tolerated., Goals met: lowered PHQ-9  and maintaining stress management/MAGED-7., Patient will be encouraged to focus on lifestyle modifications following discharge. Education: benefits of a positive support system, class:  Stress and Your Health  and class:  Relaxation  Plan: Practice relaxation techniques, Exercise and Keep a positive mindset  Readiness to change: Maintenance: (Maintaining the behavior change)      OTHER CORE COMPONENTS     Tobacco:   Social History     Tobacco Use   Smoking Status Former    Packs/day: 1.00    Years: 20.00    Total pack years: 20.00    Types: Cigarettes   Smokeless Tobacco Never   Tobacco Comments    20 years ago.        Tobacco Use Intervention:   N/A: Pt has a remote history of smoking    Anginal Symptoms:  None   NTG use: No prescription    Blood pressure:    Restin/60 - 126/74    Exercise: 118/70 - 140/74    Goals: consistent BP < 130/80, reduced dietary sodium <2300mg, moderate intensity exercise >150 mins/wk, medication compliance, reduce number of medications  needed for BP control and Abstain from smoking    Progression Toward Goals: Will continue to educate and progress as tolerated., Goals met: BP under control, med compliance, following low sodium diet, will monitor BP at home., Patient will be encouraged to focus on lifestyle modifications following discharge.     Education:  understanding high blood pressure and it's relationship to CAD, Education class:  Common Heart Medications, and Education class: Understanding Heart Disease  Plan: Avoid Processed foods, engage in regular exercise, check labels for sodium content, and monitor home BP  Readiness to change: Maintenance: (Maintaining the behavior change)

## 2023-11-23 ENCOUNTER — HOSPITAL ENCOUNTER (EMERGENCY)
Facility: HOSPITAL | Age: 63
Discharge: HOME | End: 2023-11-23
Attending: EMERGENCY MEDICINE
Payer: COMMERCIAL

## 2023-11-23 ENCOUNTER — APPOINTMENT (EMERGENCY)
Dept: RADIOLOGY | Facility: HOSPITAL | Age: 63
End: 2023-11-23
Payer: COMMERCIAL

## 2023-11-23 VITALS
OXYGEN SATURATION: 100 % | WEIGHT: 184.75 LBS | TEMPERATURE: 97.7 F | DIASTOLIC BLOOD PRESSURE: 70 MMHG | SYSTOLIC BLOOD PRESSURE: 146 MMHG | RESPIRATION RATE: 18 BRPM | BODY MASS INDEX: 28.09 KG/M2 | HEART RATE: 103 BPM

## 2023-11-23 DIAGNOSIS — R07.89 CHEST WALL PAIN: Primary | ICD-10-CM

## 2023-11-23 LAB
ALBUMIN SERPL BCP-MCNC: 3.7 G/DL (ref 3.5–5)
ALP SERPL-CCNC: 72 U/L (ref 34–104)
ALT SERPL W P-5'-P-CCNC: 10 U/L (ref 7–52)
ANION GAP SERPL CALCULATED.3IONS-SCNC: 8 MMOL/L
AST SERPL W P-5'-P-CCNC: 11 U/L (ref 13–39)
BASOPHILS # BLD AUTO: 0.02 THOUSANDS/ÂΜL (ref 0–0.1)
BASOPHILS NFR BLD AUTO: 0 % (ref 0–1)
BILIRUB SERPL-MCNC: 0.28 MG/DL (ref 0.2–1)
BUN SERPL-MCNC: 26 MG/DL (ref 5–25)
CALCIUM SERPL-MCNC: 9.4 MG/DL (ref 8.4–10.2)
CARDIAC TROPONIN I PNL SERPL HS: <2 NG/L
CHLORIDE SERPL-SCNC: 100 MMOL/L (ref 96–108)
CO2 SERPL-SCNC: 26 MMOL/L (ref 21–32)
CREAT SERPL-MCNC: 0.97 MG/DL (ref 0.6–1.3)
EOSINOPHIL # BLD AUTO: 0.03 THOUSAND/ÂΜL (ref 0–0.61)
EOSINOPHIL NFR BLD AUTO: 0 % (ref 0–6)
ERYTHROCYTE [DISTWIDTH] IN BLOOD BY AUTOMATED COUNT: 12.5 % (ref 11.6–15.1)
GFR SERPL CREATININE-BSD FRML MDRD: 61 ML/MIN/1.73SQ M
GLUCOSE SERPL-MCNC: 166 MG/DL (ref 65–140)
HCT VFR BLD AUTO: 36.3 % (ref 34.8–46.1)
HGB BLD-MCNC: 11.4 G/DL (ref 11.5–15.4)
IMM GRANULOCYTES # BLD AUTO: 0.01 THOUSAND/UL (ref 0–0.2)
IMM GRANULOCYTES NFR BLD AUTO: 0 % (ref 0–2)
LYMPHOCYTES # BLD AUTO: 1.29 THOUSANDS/ÂΜL (ref 0.6–4.47)
LYMPHOCYTES NFR BLD AUTO: 19 % (ref 14–44)
MCH RBC QN AUTO: 28.9 PG (ref 26.8–34.3)
MCHC RBC AUTO-ENTMCNC: 31.4 G/DL (ref 31.4–37.4)
MCV RBC AUTO: 92 FL (ref 82–98)
MONOCYTES # BLD AUTO: 0.38 THOUSAND/ÂΜL (ref 0.17–1.22)
MONOCYTES NFR BLD AUTO: 6 % (ref 4–12)
NEUTROPHILS # BLD AUTO: 5.04 THOUSANDS/ÂΜL (ref 1.85–7.62)
NEUTS SEG NFR BLD AUTO: 75 % (ref 43–75)
NRBC BLD AUTO-RTO: 0 /100 WBCS
PLATELET # BLD AUTO: 364 THOUSANDS/UL (ref 149–390)
PMV BLD AUTO: 9.8 FL (ref 8.9–12.7)
POTASSIUM SERPL-SCNC: 4.2 MMOL/L (ref 3.5–5.3)
PROT SERPL-MCNC: 7.4 G/DL (ref 6.4–8.4)
RBC # BLD AUTO: 3.95 MILLION/UL (ref 3.81–5.12)
SODIUM SERPL-SCNC: 134 MMOL/L (ref 135–147)
WBC # BLD AUTO: 6.77 THOUSAND/UL (ref 4.31–10.16)

## 2023-11-23 PROCEDURE — 99285 EMERGENCY DEPT VISIT HI MDM: CPT

## 2023-11-23 PROCEDURE — 84484 ASSAY OF TROPONIN QUANT: CPT

## 2023-11-23 PROCEDURE — 99285 EMERGENCY DEPT VISIT HI MDM: CPT | Performed by: EMERGENCY MEDICINE

## 2023-11-23 PROCEDURE — 80053 COMPREHEN METABOLIC PANEL: CPT

## 2023-11-23 PROCEDURE — 93005 ELECTROCARDIOGRAM TRACING: CPT

## 2023-11-23 PROCEDURE — 36415 COLL VENOUS BLD VENIPUNCTURE: CPT

## 2023-11-23 PROCEDURE — 85025 COMPLETE CBC W/AUTO DIFF WBC: CPT

## 2023-11-23 PROCEDURE — 71045 X-RAY EXAM CHEST 1 VIEW: CPT

## 2023-11-23 NOTE — ED PROVIDER NOTES
History  Chief Complaint   Patient presents with    Rib Pain     Patient c/o BL under breast pain radiating towards middle of back. Had a stent placed in June. Denies SOB. Ms. Amrik Lambert is a 68-year-old female with a past medical history of hypertension, HLD, diabetes, and an elective cardiac catheterization with stent placement in June. She presents today for a week to week and a half history of chest pain. Patient reports that she has had intermittent chest pain that is typically relieved with Tylenol, or resting in a reclined position. Reports that she recently got a new puppy, that is 60 pounds who likes to jump on her chest, so she believes that this is likely musculoskeletal pain. But due to her stent placement in June she is concerned about her heart, and wanted to be checked out. She reports that the pain feels like it is the rib that sits just underneath her breast, she endorses radiation towards her back wrapping around her side. Prior to Admission Medications   Prescriptions Last Dose Informant Patient Reported? Taking? Blood Glucose Monitoring Suppl (OneTouch Verio Reflect) w/Device KIT  Self No No   Sig: May substitute brand based on insurance coverage. Check glucose ACHS. Empagliflozin (Jardiance) 10 MG TABS tablet  Self No No   Sig: Take 1 tablet (10 mg total) by mouth every morning   Insulin Pen Needle (BD Pen Needle Trudy 2nd Gen) 32G X 4 MM MISC  Self No No   Sig: For use with insulin pen. Pharmacy may dispense brand covered by insurance. Lancets (freestyle) lancets  Self No No   Sig: Use as instructed   OneTouch Delica Lancets 24B MISC  Self No No   Sig: May substitute brand based on insurance coverage. Check glucose ACHS.    Patient not taking: Reported on 7/21/2023   aspirin 81 mg chewable tablet  Self No No   Sig: Chew 1 tablet (81 mg total) daily   atorvastatin (LIPITOR) 40 mg tablet  Self No No   Sig: Take 1 tablet (40 mg total) by mouth daily   calcium citrate (CALCITRATE) 950 MG tablet  Self Yes No   Sig: Take 1 tablet by mouth daily   cholecalciferol (VITAMIN D3) 1,000 units tablet  Self Yes No   Sig: Take 1,000 Units by mouth daily   clopidogrel (PLAVIX) 75 mg tablet  Self No No   Sig: Take 1 tablet (75 mg total) by mouth daily   cyanocobalamin 100 MCG tablet  Self Yes No   Sig: Take 100 mcg by mouth daily   glucose blood (FREESTYLE LITE) test strip  Self No No   Sig: Use as instructed   glucose blood (OneTouch Verio) test strip  Self No No   Sig: May substitute brand based on insurance coverage. Check glucose ACHS. Patient not taking: Reported on 2023   insulin degludec Sharlett Gander FlexTouch) 100 units/mL injection pen  Self No No   Sig: 10 units daily. Dose change   lisinopril (ZESTRIL) 5 mg tablet   No No   Sig: Take 1 tablet (5 mg total) by mouth daily   metFORMIN (GLUCOPHAGE) 500 mg tablet  Self No No   Si tab bid   metoprolol succinate (TOPROL-XL) 25 mg 24 hr tablet  Self No No   Sig: TAKE ONE TABLET BY MOUTH ONCE DAILY   multivitamin (THERAGRAN) TABS  Self Yes No   Sig: Take 1 tablet by mouth daily      Facility-Administered Medications: None       Past Medical History:   Diagnosis Date    Diabetes mellitus ([address]) 2023    Hypertension     Obesity     Had weight trell surgery       Past Surgical History:   Procedure Laterality Date    BARIATRIC SURGERY      CARDIAC CATHETERIZATION N/A 2023    Procedure: Cardiac Coronary Angiogram;  Surgeon: Taylor Pedraza DO;  Location: [facility]; Service: Cardiology    CARDIAC CATHETERIZATION N/A 2023    Procedure: Cardiac pci;  Surgeon: Taylor Pedraza DO;  Location: [facility];   Service: Cardiology     SECTION      32 years ago (2019)       Family History   Problem Relation Age of Onset    Arrhythmia Mother         atrial flutter    Skin cancer Mother     Hypertension Father     Hyperlipidemia Father     Atrial fibrillation Sister     Atrial fibrillation Brother     Other Brother sarosis of liver    Heart attack Paternal Uncle     Breast cancer Maternal Grandmother     Throat cancer Maternal Grandfather     Stroke Neg Hx     Anuerysm Neg Hx     Clotting disorder Neg Hx     Heart failure Neg Hx     Coronary artery disease Neg Hx      I have reviewed and agree with the history as documented. E-Cigarette/Vaping    E-Cigarette Use Never User      E-Cigarette/Vaping Substances    Nicotine No     THC No     CBD No     Flavoring No     Other No     Unknown No      Social History     Tobacco Use    Smoking status: Former     Packs/day: 1.00     Years: 20.00     Total pack years: 20.00     Types: Cigarettes     Quit date:      Years since quittin.9    Smokeless tobacco: Never    Tobacco comments:     20 years ago. Vaping Use    Vaping Use: Never used   Substance Use Topics    Alcohol use: Never    Drug use: Never        Review of Systems   Constitutional:  Negative for activity change, chills and fever. Eyes:  Negative for pain and visual disturbance. Respiratory:  Negative for chest tightness and shortness of breath. Cardiovascular:  Positive for chest pain. Gastrointestinal:  Negative for abdominal pain, nausea and vomiting. Genitourinary:  Negative for dysuria and hematuria. Skin:  Negative for rash and wound. Neurological:  Negative for dizziness, syncope, light-headedness and headaches. Psychiatric/Behavioral: Negative. Physical Exam  ED Triage Vitals [23 1245]   Temperature Pulse Respirations Blood Pressure SpO2   97.7 °F (36.5 °C) 103 18 146/70 100 %      Temp Source Heart Rate Source Patient Position - Orthostatic VS BP Location FiO2 (%)   Oral -- Sitting Right arm --      Pain Score       3             Orthostatic Vital Signs  Vitals:    23 1245   BP: 146/70   Pulse: 103   Patient Position - Orthostatic VS: Sitting       Physical Exam  Vitals and nursing note reviewed. Constitutional:       Appearance: Normal appearance.    HENT: Head: Normocephalic and atraumatic. Right Ear: External ear normal.      Left Ear: External ear normal.      Nose: Nose normal.      Mouth/Throat:      Mouth: Mucous membranes are moist.      Pharynx: Oropharynx is clear. Eyes:      Extraocular Movements: Extraocular movements intact. Conjunctiva/sclera: Conjunctivae normal.   Cardiovascular:      Rate and Rhythm: Normal rate and regular rhythm. Heart sounds: Normal heart sounds. Comments: I was unable to reproduce the patient's pain with palpation of her chest.  Pulmonary:      Effort: Pulmonary effort is normal.      Breath sounds: Normal breath sounds. Abdominal:      General: Abdomen is flat. Palpations: Abdomen is soft. Musculoskeletal:         General: Normal range of motion. Cervical back: Normal range of motion and neck supple. Skin:     General: Skin is warm and dry. Neurological:      General: No focal deficit present. Mental Status: She is alert and oriented to person, place, and time.    Psychiatric:         Mood and Affect: Mood normal.         Behavior: Behavior normal.         ED Medications  Medications - No data to display    Diagnostic Studies  Results Reviewed       Procedure Component Value Units Date/Time    HS Troponin 0hr (reflex protocol) [[id number]]  (Normal) Collected: 11/23/23 1322    Lab Status: Final result Specimen: Blood from Arm, Left Updated: 11/23/23 1402     hs TnI 0hr <2 ng/L     Comprehensive metabolic panel [[id number]]  (Abnormal) Collected: 11/23/23 1322    Lab Status: Final result Specimen: Blood from Arm, Left Updated: 11/23/23 1340     Sodium 134 mmol/L      Potassium 4.2 mmol/L      Chloride 100 mmol/L      CO2 26 mmol/L      ANION GAP 8 mmol/L      BUN 26 mg/dL      Creatinine 0.97 mg/dL      Glucose 166 mg/dL      Calcium 9.4 mg/dL      AST 11 U/L      ALT 10 U/L      Alkaline Phosphatase 72 U/L      Total Protein 7.4 g/dL      Albumin 3.7 g/dL      Total Bilirubin 0.28 mg/dL eGFR 61 ml/min/1.73sq m     Narrative:      [facility] guidelines for Chronic Kidney Disease (CKD):     Stage 1 with normal or high GFR (GFR > 90 mL/min/1.73 square meters)    Stage 2 Mild CKD (GFR = 60-89 mL/min/1.73 square meters)    Stage 3A Moderate CKD (GFR = 45-59 mL/min/1.73 square meters)    Stage 3B Moderate CKD (GFR = 30-44 mL/min/1.73 square meters)    Stage 4 Severe CKD (GFR = 15-29 mL/min/1.73 square meters)    Stage 5 End Stage CKD (GFR <15 mL/min/1.73 square meters)  Note: GFR calculation is accurate only with a steady state creatinine    CBC and differential [[id number]]  (Abnormal) Collected: 11/23/23 1322    Lab Status: Final result Specimen: Blood from Arm, Left Updated: 11/23/23 1328     WBC 6.77 Thousand/uL      RBC 3.95 Million/uL      Hemoglobin 11.4 g/dL      Hematocrit 36.3 %      MCV 92 fL      MCH 28.9 pg      MCHC 31.4 g/dL      RDW 12.5 %      MPV 9.8 fL      Platelets 089 Thousands/uL      nRBC 0 /100 WBCs      Neutrophils Relative 75 %      Immat GRANS % 0 %      Lymphocytes Relative 19 %      Monocytes Relative 6 %      Eosinophils Relative 0 %      Basophils Relative 0 %      Neutrophils Absolute 5.04 Thousands/µL      Immature Grans Absolute 0.01 Thousand/uL      Lymphocytes Absolute 1.29 Thousands/µL      Monocytes Absolute 0.38 Thousand/µL      Eosinophils Absolute 0.03 Thousand/µL      Basophils Absolute 0.02 Thousands/µL                    XR chest 1 view portable   Final Result by Clare Bhat MD (11/23 0853)      No acute cardiopulmonary disease.                Workstation performed: [id number]               Procedures  ECG 12 Lead Documentation Only    Date/Time: 11/23/2023 2:03 PM    Performed by: Oma Riedel, MD  Authorized by: Oma Riedel, MD    Indications / Diagnosis:  Chest pain  ECG reviewed by me, the ED Provider: yes    Patient location:  ED  Previous ECG:     Previous ECG:  Compared to current    Comparison ECG info: 6/5/2023    Similarity:  No change  Interpretation:     Interpretation: normal    Rate:     ECG rate:  94    ECG rate assessment: normal    Rhythm:     Rhythm: sinus rhythm    Ectopy:     Ectopy: none    QRS:     QRS axis:  Normal    QRS intervals:  Normal  Conduction:     Conduction: normal    ST segments:     ST segments:  Normal  T waves:     T waves: normal          ED Course  ED Course as of 11/23/23 1419   Thu Nov 23, 2023   1401 Hemoglobin(!): 11.4  Patient is slightly anemic, this is at her baseline. 1404 Sodium(!): 134  Slightly hyponatremic.   1404 BUN(!): 26  At patient's baseline. 1418 HS Troponin 0hr (reflex protocol)  Pain is a week and a half old, no need for second troponin. SBIRT 22yo+      Flowsheet Row Most Recent Value   Initial Alcohol Screen: US AUDIT-C     1. How often do you have a drink containing alcohol? 0 Filed at: 11/23/2023 1247   2. How many drinks containing alcohol do you have on a typical day you are drinking? 0 Filed at: 11/23/2023 1247   3b. FEMALE Any Age, or MALE 65+: How often do you have 4 or more drinks on one occassion? 0 Filed at: 11/23/2023 1247   Audit-C Score 0 Filed at: 11/23/2023 1247   RAMONITA: How many times in the past year have you. .. Used an illegal drug or used a prescription medication for non-medical reasons? Never Filed at: 11/23/2023 1247                  Medical Decision Making  See ED course for medical decision making. Amount and/or Complexity of Data Reviewed  Labs: ordered. Radiology: ordered.           Disposition  Final diagnoses:   Chest wall pain     Time reflects when diagnosis was documented in both MDM as applicable and the Disposition within this note       Time User Action Codes Description Comment    11/23/2023  2:05 PM Monica Woo Add [R07.89] Chest wall pain           ED Disposition       ED Disposition   Discharge    Condition   Stable    Date/Time   Thu Nov 23, 2023 Kamaljit Fleming discharge to home/self care. Follow-up Information       Follow up With Specialties Details Why Darron Garcia, DO Family Medicine Schedule an appointment as soon as possible for a visit  Please schedule an appointment with your primary care if symptoms persist. [address] [place] [address]  [phone]              Discharge Medication List as of 11/23/2023  2:06 PM        CONTINUE these medications which have NOT CHANGED    Details   aspirin 81 mg chewable tablet Chew 1 tablet (81 mg total) daily, Starting Fri 6/2/2023, Normal      atorvastatin (LIPITOR) 40 mg tablet Take 1 tablet (40 mg total) by mouth daily, Starting Mon 6/5/2023, Normal      Blood Glucose Monitoring Suppl (OneTouch Verio Reflect) w/Device KIT May substitute brand based on insurance coverage. Check glucose ACHS., Normal      calcium citrate (CALCITRATE) 950 MG tablet Take 1 tablet by mouth daily, Historical Med      cholecalciferol (VITAMIN D3) 1,000 units tablet Take 1,000 Units by mouth daily, Historical Med      clopidogrel (PLAVIX) 75 mg tablet Take 1 tablet (75 mg total) by mouth daily, Starting Mon 6/5/2023, Normal      cyanocobalamin 100 MCG tablet Take 100 mcg by mouth daily, Historical Med      Empagliflozin (Jardiance) 10 MG TABS tablet Take 1 tablet (10 mg total) by mouth every morning, Starting Fri 7/21/2023, Normal      !! glucose blood (FREESTYLE LITE) test strip Use as instructed, Normal      !! glucose blood (OneTouch Verio) test strip May substitute brand based on insurance coverage. Check glucose ACHS., Normal      insulin degludec Latrelle Inch FlexTouch) 100 units/mL injection pen 10 units daily. Dose change, Normal      Insulin Pen Needle (BD Pen Needle Trudy 2nd Gen) 32G X 4 MM MISC For use with insulin pen. Pharmacy may dispense brand covered by insurance., Normal      !!  Lancets (freestyle) lancets Use as instructed, Normal      lisinopril (ZESTRIL) 5 mg tablet Take 1 tablet (5 mg total) by mouth daily, Starting u 10/12/2023, Normal      metFORMIN (GLUCOPHAGE) 500 mg tablet 1 tab bid, Normal      metoprolol succinate (TOPROL-XL) 25 mg 24 hr tablet TAKE ONE TABLET BY MOUTH ONCE DAILY, Normal      multivitamin (THERAGRAN) TABS Take 1 tablet by mouth daily, Historical Med      !! OneTouch Delica Lancets 63M MISC May substitute brand based on insurance coverage. Check glucose ACHS., Normal       !! - Potential duplicate medications found. Please discuss with provider. No discharge procedures on file. PDMP Review       None             ED Provider  Attending physically available and evaluated Jovanni Francis. I managed the patient along with the ED Attending.     Electronically Signed by           Shin Coburn MD  11/23/23 9779

## 2023-11-23 NOTE — DISCHARGE INSTRUCTIONS
Please return to the ED if your chest pain worsens, you develop shortness of breath, difficulty breathing.

## 2023-11-23 NOTE — ED ATTENDING ATTESTATION
11/23/2023  IHilda MD, saw and evaluated the patient. I have discussed the patient with the resident/non-physician practitioner and agree with the resident's/non-physician practitioner's findings, Plan of Care, and MDM as documented in the resident's/non-physician practitioner's note, except where noted. All available labs and Radiology studies were reviewed. I was present for key portions of any procedure(s) performed by the resident/non-physician practitioner and I was immediately available to provide assistance. At this point I agree with the current assessment done in the Emergency Department. I have conducted an independent evaluation of this patient a history and physical is as follows:    ED Course  ED Course as of 11/23/23 1413   Thu Nov 23, 2023   157 69-year-old female presenting to the emergency department with left-sided rib/chest pain radiating underneath her left breast.  Medical history significant for CAD status post stent placement, hyperlipidemia, diabetes. Patient reports that approximately 1 week ago she began to develop sharp soreness under her left breast radiating towards her left back. Patient also adopted a 60 pound Abhinav puppy 1 week ago who has been jumping on her and pulling her around. Additionally, several days ago patient developed URI symptoms increased cough which have now resolved. Pain worse with sitting upright. Resolves with Tylenol intermittently. Denies weakness of breath, palpitations nausea, vomiting, abdominal pain, urinary symptoms, changes in stool, leg pain or leg swelling, rash, numbness, fever. No sick contacts, no recent travel. Presents because of her history of cardiac stent concern for possible cardiac urology. Her vital signs are remarkable for tachycardia. 1258 Pulse: 103   1258 Physical exam grossly unremarkable. Pain is nonreproducible. Patient has no tenderness on palpation of the rib area.   Heart and lungs aside from tachycardia grossly unremarkable. No pedal edema. Her clinical presentation puts her at risk for the following differential diagnoses: costochondritis, musculoskeletal strain, fracture, ACS   1303 ECG 12 lead  Normal sinus rhythm 94, QRS 98, QTc 450, no ST elevation or depression, no ectopy, no terminal R. Overall impression: No STEMI.   1401 Sodium(!): 134  Per natremia   1401 BUN(!): 26  Very slight elevation   1401 Hemoglobin(!): 11.4  Baseline   1402 XR chest 1 view portable  No acute findings. 1412 hs TnI 0hr: <2   5641 Upon re-assessment, patient feels improved. Patient remained hemodynamically and clinically stable in the ED. Strict return precautions given. Patient verbalized understanding and will follow up as outpatient with PMD.  Upon discharge, patient was Dorathy Spear, and fully ambulatory.              Critical Care Time  Procedures

## 2023-11-24 LAB
ATRIAL RATE: 94 BPM
P AXIS: 42 DEGREES
PR INTERVAL: 148 MS
QRS AXIS: 53 DEGREES
QRSD INTERVAL: 98 MS
QT INTERVAL: 360 MS
QTC INTERVAL: 450 MS
T WAVE AXIS: 54 DEGREES
VENTRICULAR RATE: 94 BPM

## 2023-11-24 PROCEDURE — 93010 ELECTROCARDIOGRAM REPORT: CPT | Performed by: INTERNAL MEDICINE

## 2023-11-25 DIAGNOSIS — R94.39 POSITIVE CARDIAC STRESS TEST: ICD-10-CM

## 2023-11-27 RX ORDER — ASPIRIN 81 MG
81 TABLET,CHEWABLE ORAL DAILY
Qty: 90 TABLET | Refills: 0 | Status: SHIPPED | OUTPATIENT
Start: 2023-11-27

## 2023-11-29 ENCOUNTER — OFFICE VISIT (OUTPATIENT)
Dept: FAMILY MEDICINE CLINIC | Facility: MEDICAL CENTER | Age: 63
End: 2023-11-29
Payer: COMMERCIAL

## 2023-11-29 VITALS
HEIGHT: 68 IN | TEMPERATURE: 98.4 F | HEART RATE: 90 BPM | DIASTOLIC BLOOD PRESSURE: 78 MMHG | WEIGHT: 181.4 LBS | SYSTOLIC BLOOD PRESSURE: 136 MMHG | BODY MASS INDEX: 27.49 KG/M2 | OXYGEN SATURATION: 97 %

## 2023-11-29 DIAGNOSIS — J40 BRONCHITIS: Primary | ICD-10-CM

## 2023-11-29 PROCEDURE — 99213 OFFICE O/P EST LOW 20 MIN: CPT

## 2023-11-29 RX ORDER — DOXYCYCLINE HYCLATE 100 MG/1
100 CAPSULE ORAL EVERY 12 HOURS SCHEDULED
Qty: 14 CAPSULE | Refills: 0 | Status: SHIPPED | OUTPATIENT
Start: 2023-11-29 | End: 2023-12-06

## 2023-11-29 NOTE — PROGRESS NOTES
Assessment/Plan:    Symptom-based treatment as below. Prescription for doxycycline sent to pharmacy, advised patient to start if symptoms persist into the weekend without relief with OTC treatment. 1. Bronchitis  Flonase once daily. Mucinex twice daily as directed. Advised to sleep with head of bed propped, run coolmist humidifier at bedtime. Would like to hold off on inhaler at this time. Doxycycline sent to pharmacy, advised to start if symptoms persist into the weekend without relief with otc treatment. - doxycycline hyclate (VIBRAMYCIN) 100 mg capsule; Take 1 capsule (100 mg total) by mouth every 12 (twelve) hours for 7 days  Dispense: 14 capsule; Refill: 0      Subjective:      Patient ID: Ellen Cervantes is a 70 y.o. female. 70year old female presents with complaints of productive cough, fatigue x 9 days. Denies fever, cp, sob, ear pain, sore throat. She was at a party around a large group of people prior to symptom onset. Home covid test negative. Fatigue  Associated symptoms include coughing and fatigue. Cough        The following portions of the patient's history were reviewed and updated as appropriate: allergies, current medications, past medical history, past social history, past surgical history, and problem list.    Review of Systems   Constitutional:  Positive for fatigue. HENT: Negative. Eyes: Negative. Respiratory:  Positive for cough. Cardiovascular: Negative. Gastrointestinal: Negative. Endocrine: Negative. Genitourinary: Negative. Musculoskeletal: Negative. Skin: Negative. Allergic/Immunologic: Negative. Neurological: Negative. Hematological: Negative. Psychiatric/Behavioral: Negative.            Objective:      /78 (BP Location: Left arm, Patient Position: Sitting, Cuff Size: Large)   Pulse 90   Temp 98.4 °F (36.9 °C)   Ht 5' 8" (1.727 m)   Wt 82.3 kg (181 lb 6.4 oz)   SpO2 97%   BMI 27.58 kg/m²          Physical Exam  Vitals and nursing note reviewed. Constitutional:       General: She is not in acute distress. Appearance: Normal appearance. She is not ill-appearing. HENT:      Head: Normocephalic and atraumatic. Right Ear: Tympanic membrane, ear canal and external ear normal.      Left Ear: Tympanic membrane, ear canal and external ear normal.      Nose: Congestion present. Right Sinus: No maxillary sinus tenderness or frontal sinus tenderness. Left Sinus: No maxillary sinus tenderness or frontal sinus tenderness. Mouth/Throat:      Mouth: Mucous membranes are moist.      Pharynx: Oropharynx is clear. Eyes:      General:         Right eye: No discharge. Left eye: No discharge. Conjunctiva/sclera: Conjunctivae normal.      Pupils: Pupils are equal, round, and reactive to light. Cardiovascular:      Rate and Rhythm: Normal rate and regular rhythm. Pulses: Normal pulses. Heart sounds: Normal heart sounds. Pulmonary:      Effort: Pulmonary effort is normal. No respiratory distress. Breath sounds: Normal breath sounds. No stridor. No wheezing, rhonchi or rales. Musculoskeletal:         General: Normal range of motion. Skin:     General: Skin is warm and dry. Neurological:      General: No focal deficit present. Mental Status: She is alert and oriented to person, place, and time. Mental status is at baseline. Psychiatric:         Mood and Affect: Mood normal.         Behavior: Behavior normal.         Thought Content:  Thought content normal.                    Shae Cat

## 2023-11-29 NOTE — PATIENT INSTRUCTIONS
Use flonase nasal spray once per day, otc. Use mucinex as directed. Sleep with head of bed propped. Run cool mist humidifier at bedtime. Start Doxycycline if symptoms persist into the weekend.

## 2023-12-05 ENCOUNTER — OFFICE VISIT (OUTPATIENT)
Dept: FAMILY MEDICINE CLINIC | Facility: MEDICAL CENTER | Age: 63
End: 2023-12-05
Payer: COMMERCIAL

## 2023-12-05 ENCOUNTER — VBI (OUTPATIENT)
Dept: ADMINISTRATIVE | Facility: OTHER | Age: 63
End: 2023-12-05

## 2023-12-05 VITALS
DIASTOLIC BLOOD PRESSURE: 80 MMHG | HEIGHT: 68 IN | RESPIRATION RATE: 16 BRPM | SYSTOLIC BLOOD PRESSURE: 106 MMHG | OXYGEN SATURATION: 90 % | WEIGHT: 178 LBS | TEMPERATURE: 97.2 F | BODY MASS INDEX: 26.98 KG/M2 | HEART RATE: 94 BPM

## 2023-12-05 DIAGNOSIS — I10 BENIGN ESSENTIAL HYPERTENSION: ICD-10-CM

## 2023-12-05 DIAGNOSIS — R05.9 COUGH, UNSPECIFIED TYPE: ICD-10-CM

## 2023-12-05 DIAGNOSIS — M79.18 MUSCULOSKELETAL PAIN: ICD-10-CM

## 2023-12-05 DIAGNOSIS — Z09 HOSPITAL DISCHARGE FOLLOW-UP: Primary | ICD-10-CM

## 2023-12-05 PROCEDURE — 99214 OFFICE O/P EST MOD 30 MIN: CPT | Performed by: STUDENT IN AN ORGANIZED HEALTH CARE EDUCATION/TRAINING PROGRAM

## 2023-12-05 RX ORDER — FAMOTIDINE 20 MG/1
20 TABLET, FILM COATED ORAL 2 TIMES DAILY
Qty: 180 TABLET | Refills: 0 | Status: SHIPPED | OUTPATIENT
Start: 2023-12-05 | End: 2024-03-04

## 2023-12-05 NOTE — PROGRESS NOTES
[facility] - Clinic Note  Omar Matias, 21     Nicolle Part MRN: [id number] : 1960 Age: 70 y.o. Assessment/Plan     1. Hospital discharge follow-up    -Patient presents for ER follow-up after recent evaluation for epigastric pain, back pain and shoulder pain  -Cardiac work-up was unremarkable    XR chest 1 view portable   Final Result by Sergio Velarde MD ( [id number])       No acute cardiopulmonary disease. Workstation performed: [id number]              Procedures  ECG 12 Lead Documentation Only     Date/Time: 2023 2:03 PM     Performed by: Guy Pedraza MD  Authorized by: Guy Pedraza MD    Indications / Diagnosis:  Chest pain  ECG reviewed by me, the ED Provider: yes    Patient location:  ED  Previous ECG:     Previous ECG:  Compared to current    Comparison ECG info:  2023    Similarity:  No change  Interpretation:     Interpretation: normal    Rate:     ECG rate:  94    ECG rate assessment: normal    Rhythm:     Rhythm: sinus rhythm    Ectopy:     Ectopy: none    QRS:     QRS axis:  Normal    QRS intervals:  Normal  Conduction:     Conduction: normal    ST segments:     ST segments:  Normal  T waves:     T waves: normal        2. Cough, unspecified type    -Was recently seen in office for this, diagnosed with bronchitis and prescribed doxycycline  -Patient also with epigastric complaint, increased belching, trial acid reducer x6 to 8 weeks  - famotidine (PEPCID) 20 mg tablet; Take 1 tablet (20 mg total) by mouth 2 (two) times a day  Dispense: 180 tablet; Refill: 0    3. Musculoskeletal pain    -Advised about topical heat, topical menthol products, period of relative rest and return to activity as tolerated  -Advised patient she can consider course of physical therapy    4.  Benign essential hypertension    -Keep BP log, if blood pressures consistently running lower end of normal, can consider decreasing lisinopril dose from 5 mg daily to 2.5 mg daily/further adjustment as appropriate      Queen Pa acknowledged understanding of treatment plan, all questions answered. Subjective      Queen Pa is a 70 y.o. female who presents for ER follow-up. She was evaluated at [address] [place] [address] on 11/23/2023 for epigastric pain that radiated bilaterally to back and also left shoulder discomfort. Patient states she was relieved to find out she did not have a fracture. Cardiac work-up was unremarkable. "I still have the coughing up of the phlegm". She was evaluated by advanced practitioner on 11/29/2023 in office for bronchitis. She was prescribed course of doxycycline. Patient mention she does have a 65 lbs. Abhinav puppy that she lifts often and suspects this is the source of her trunk pain. She states she went to chiropractor twice last week. She states that she was lifting her dog often to get into car but, does not have to do that any longer as  found alternative solution. "Burping past 2 weeks a lot more than I normally do". Blood pressure lower end of normal today in office. Denies any lightheadedness or syncopal events. ED Course      ED Course as of 11/23/23 1419   Thu Nov 23, 2023   1401 Hemoglobin(!): 11.4  Patient is slightly anemic, this is at her baseline. 1404 Sodium(!): 134  Slightly hyponatremic.   1404 BUN(!): 26  At patient's baseline. 1418 HS Troponin 0hr (reflex protocol)  Pain is a week and a half old, no need for second troponin.           The following portions of the patient's history were reviewed and updated as appropriate: allergies, current medications, past family history, past medical history, past social history, past surgical history and problem list.     Past Medical History:   Diagnosis Date    Diabetes mellitus ([address]) 4 2023    Hypertension     Obesity     Had weight trell surgery       Allergies   Allergen Reactions    Other Other (See Comments)     No Nsaids due to weight loss surgery        Past Surgical History:   Procedure Laterality Date    BARIATRIC SURGERY  2015    CARDIAC CATHETERIZATION N/A 2023    Procedure: Cardiac Coronary Angiogram;  Surgeon: Taylor Pedraza DO;  Location: [facility]; Service: Cardiology    CARDIAC CATHETERIZATION N/A 2023    Procedure: Cardiac pci;  Surgeon: Taylor Pedraza DO;  Location: [facility]; Service: Cardiology     SECTION      32 years ago (2019)       Family History   Problem Relation Age of Onset    Arrhythmia Mother         atrial flutter    Skin cancer Mother     Hypertension Father     Hyperlipidemia Father     Atrial fibrillation Sister     Atrial fibrillation Brother     Other Brother         sarosis of liver    Breast cancer Maternal Grandmother     Throat cancer Maternal Grandfather     Heart attack Paternal Uncle     Stroke Neg Hx     Anuerysm Neg Hx     Clotting disorder Neg Hx     Heart failure Neg Hx     Coronary artery disease Neg Hx        Social History     Socioeconomic History    Marital status: /Civil Union     Spouse name: None    Number of children: None    Years of education: None    Highest education level: None   Occupational History    None   Tobacco Use    Smoking status: Former     Packs/day: 1.00     Years: 20.00     Total pack years: 20.00     Types: Cigarettes     Quit date:      Years since quittin.9    Smokeless tobacco: Never    Tobacco comments:     20 years ago.    Vaping Use    Vaping Use: Never used   Substance and Sexual Activity    Alcohol use: Never    Drug use: Never    Sexual activity: Not Currently     Partners: Male     Birth control/protection: Post-menopausal   Other Topics Concern    None   Social History Narrative    None     Social Determinants of Health     Financial Resource Strain: Not on file   Food Insecurity: Not on file   Transportation Needs: Not on file   Physical Activity: Not on file   Stress: Not on file   Social Connections: Not on file   Intimate Partner Violence: Not on file   Housing Stability: Not on file       Current Outpatient Medications   Medication Sig Dispense Refill    Aspirin Low Dose 81 MG chewable tablet CHEW AND SWALLOW ONE TABLET BY MOUTH ONCE DAILY 90 tablet 0    atorvastatin (LIPITOR) 40 mg tablet Take 1 tablet (40 mg total) by mouth daily 90 tablet 3    Blood Glucose Monitoring Suppl (OneTouch Verio Reflect) w/Device KIT May substitute brand based on insurance coverage. Check glucose ACHS. 1 kit 0    calcium citrate (CALCITRATE) 950 MG tablet Take 1 tablet by mouth daily      cholecalciferol (VITAMIN D3) 1,000 units tablet Take 1,000 Units by mouth daily      clopidogrel (PLAVIX) 75 mg tablet Take 1 tablet (75 mg total) by mouth daily 90 tablet 4    cyanocobalamin 100 MCG tablet Take 100 mcg by mouth daily      doxycycline hyclate (VIBRAMYCIN) 100 mg capsule Take 1 capsule (100 mg total) by mouth every 12 (twelve) hours for 7 days 14 capsule 0    Empagliflozin (Jardiance) 10 MG TABS tablet Take 1 tablet (10 mg total) by mouth every morning 90 tablet 2    famotidine (PEPCID) 20 mg tablet Take 1 tablet (20 mg total) by mouth 2 (two) times a day 180 tablet 0    glucose blood (FREESTYLE LITE) test strip Use as instructed 300 strip 3    glucose blood (OneTouch Verio) test strip May substitute brand based on insurance coverage. Check glucose ACHS. 200 each 0    insulin degludec Bev [employer] FlexTouch) 100 units/mL injection pen 10 units daily. Dose change 15 mL 0    Insulin Pen Needle (BD Pen Needle Trudy 2nd Gen) 32G X 4 MM MISC For use with insulin pen. Pharmacy may dispense brand covered by insurance.  300 each 3    Lancets (freestyle) lancets Use as instructed 300 each 3    lisinopril (ZESTRIL) 5 mg tablet Take 1 tablet (5 mg total) by mouth daily 90 tablet 3    metFORMIN (GLUCOPHAGE) 500 mg tablet 1 tab bid 180 tablet 1    metoprolol succinate (TOPROL-XL) 25 mg 24 hr tablet TAKE ONE TABLET BY MOUTH ONCE DAILY 90 tablet 0    multivitamin (THERAGRAN) TABS Take 1 tablet by mouth daily      OneTouch Delica Lancets 25I MISC May substitute brand based on insurance coverage. Check glucose ACHS. 200 each 0     No current facility-administered medications for this visit. Review of Systems     As noted in HPI    Objective      /80   Pulse 94   Temp (!) 97.2 °F (36.2 °C) (Temporal)   Resp 16   Ht 5' 8" (1.727 m)   Wt 80.7 kg (178 lb)   SpO2 90%   BMI 27.06 kg/m²     Physical Exam  Vitals reviewed. Constitutional:       General: She is not in acute distress. Appearance: Normal appearance. HENT:      Head: Normocephalic and atraumatic. Right Ear: Tympanic membrane, ear canal and external ear normal.      Left Ear: Tympanic membrane, ear canal and external ear normal.   Eyes:      Conjunctiva/sclera: Conjunctivae normal.   Cardiovascular:      Rate and Rhythm: Normal rate and regular rhythm. Pulses: Normal pulses. Heart sounds: Normal heart sounds. Pulmonary:      Effort: Pulmonary effort is normal.      Breath sounds: Normal breath sounds. Abdominal:      General: Bowel sounds are normal.      Palpations: Abdomen is soft. Tenderness: There is no abdominal tenderness. There is no guarding or rebound. Musculoskeletal:      Right lower leg: No edema. Left lower leg: No edema. Skin:     General: Skin is warm and dry. Neurological:      Mental Status: She is alert and oriented to person, place, and time. Psychiatric:         Mood and Affect: Mood normal.         Behavior: Behavior normal.         Thought Content: Thought content normal.             Some portions of this record may have been generated with voice recognition software. There may be translation, syntax, or grammatical errors. Occasional wrong word or "sound-a-like" substitutions may have occurred due to the inherent limitations of the voice recognition software.  Read the chart carefully and recognize, using context, where substations may have occurred. If you have any questions, please contact the dictating provider for clarification or correction, as needed.

## 2023-12-06 ENCOUNTER — TELEPHONE (OUTPATIENT)
Dept: CARDIOLOGY CLINIC | Facility: CLINIC | Age: 63
End: 2023-12-06

## 2023-12-06 ENCOUNTER — TELEPHONE (OUTPATIENT)
Age: 63
End: 2023-12-06

## 2023-12-06 ENCOUNTER — APPOINTMENT (OUTPATIENT)
Dept: LAB | Facility: MEDICAL CENTER | Age: 63
End: 2023-12-06
Payer: COMMERCIAL

## 2023-12-06 DIAGNOSIS — Z11.59 NEED FOR HEPATITIS C SCREENING TEST: ICD-10-CM

## 2023-12-06 DIAGNOSIS — Z11.4 SCREENING FOR HIV (HUMAN IMMUNODEFICIENCY VIRUS): ICD-10-CM

## 2023-12-06 DIAGNOSIS — E11.65 TYPE 2 DIABETES MELLITUS WITH HYPERGLYCEMIA, WITHOUT LONG-TERM CURRENT USE OF INSULIN (HCC): ICD-10-CM

## 2023-12-06 DIAGNOSIS — D64.9 ANEMIA, UNSPECIFIED TYPE: ICD-10-CM

## 2023-12-06 DIAGNOSIS — I10 BENIGN ESSENTIAL HYPERTENSION: ICD-10-CM

## 2023-12-06 LAB
ANION GAP SERPL CALCULATED.3IONS-SCNC: 12 MMOL/L
BUN SERPL-MCNC: 29 MG/DL (ref 5–25)
CALCIUM SERPL-MCNC: 9.5 MG/DL (ref 8.4–10.2)
CHLORIDE SERPL-SCNC: 96 MMOL/L (ref 96–108)
CO2 SERPL-SCNC: 26 MMOL/L (ref 21–32)
CREAT SERPL-MCNC: 0.95 MG/DL (ref 0.6–1.3)
EST. AVERAGE GLUCOSE BLD GHB EST-MCNC: 189 MG/DL
FERRITIN SERPL-MCNC: 567 NG/ML (ref 11–307)
GFR SERPL CREATININE-BSD FRML MDRD: 63 ML/MIN/1.73SQ M
GLUCOSE P FAST SERPL-MCNC: 154 MG/DL (ref 65–99)
HBA1C MFR BLD: 8.2 %
HCV AB SER QL: NORMAL
HIV 1+2 AB+HIV1 P24 AG SERPL QL IA: NORMAL
HIV 2 AB SERPL QL IA: NORMAL
HIV1 AB SERPL QL IA: NORMAL
HIV1 P24 AG SERPL QL IA: NORMAL
IRON SATN MFR SERPL: 9 % (ref 15–50)
IRON SERPL-MCNC: 17 UG/DL (ref 50–212)
POTASSIUM SERPL-SCNC: 4.5 MMOL/L (ref 3.5–5.3)
SODIUM SERPL-SCNC: 134 MMOL/L (ref 135–147)
TIBC SERPL-MCNC: 185 UG/DL (ref 250–450)
UIBC SERPL-MCNC: 168 UG/DL (ref 155–355)

## 2023-12-06 PROCEDURE — 83540 ASSAY OF IRON: CPT

## 2023-12-06 PROCEDURE — 82985 ASSAY OF GLYCATED PROTEIN: CPT

## 2023-12-06 PROCEDURE — 86803 HEPATITIS C AB TEST: CPT

## 2023-12-06 PROCEDURE — 82728 ASSAY OF FERRITIN: CPT

## 2023-12-06 PROCEDURE — 87389 HIV-1 AG W/HIV-1&-2 AB AG IA: CPT

## 2023-12-06 PROCEDURE — 83036 HEMOGLOBIN GLYCOSYLATED A1C: CPT

## 2023-12-06 PROCEDURE — 36415 COLL VENOUS BLD VENIPUNCTURE: CPT

## 2023-12-06 PROCEDURE — 80048 BASIC METABOLIC PNL TOTAL CA: CPT

## 2023-12-06 PROCEDURE — 83550 IRON BINDING TEST: CPT

## 2023-12-06 RX ORDER — LISINOPRIL 2.5 MG/1
2.5 TABLET ORAL DAILY
COMMUNITY

## 2023-12-06 NOTE — TELEPHONE ENCOUNTER
P/C had seen PCP due to having a hospitalization on 11/24/2023    Blanche Amador has been sick for the last three weeks with cold,bronchitis. She has been having terrible fatigue throughout the day. So tired. BP is on the lower side for her 90/60. She states that last weight is 175 LBS. She is not eating a lot trying to stay hydrated. Denies any chest pains,palpations,lightheaded,dizziness    Her PCP is having her track her BP for the next week. Asking if her medications need to be adjusted.      BMP done 12/6/2023  Sodium 134  Potassium 4.5  Creatinine 0.95  BUN 29    Metoprolol succinate qd  Lisinopril 5mg qd  Jardiance 10 mg qd  Plavix 75 mg qd    Last office visit 10/12/2023    Please advise

## 2023-12-06 NOTE — TELEPHONE ENCOUNTER
Patient called and states was feeling very tired, couldn't keep eyes open, took her BP and was 98/64 about 12:30 pm patient states is increasing her intake of fluids and eating things with salt.  Would like a call back asap [phone]

## 2023-12-06 NOTE — TELEPHONE ENCOUNTER
Discussed at recent office visit blood pressure lower end of normal and can consider decreasing dose of lisinopril/discontinuing if appropriate. Please take half dose of lisinopril 2.5 mg daily not 5 mg daily. Call back with blood pressure readings. Seek medical attention if worsening symptoms.

## 2023-12-06 NOTE — TELEPHONE ENCOUNTER
Hussein Agee called back, wanting to find out what to do about her medications. Called her back and advised that would continue medications as directed until we hear from Dr Temo Eldridge on instructions.      Pt verbally understood

## 2023-12-06 NOTE — TELEPHONE ENCOUNTER
Patient said that she is feeling better after drinking water and resting. Eating some salty food. She said that she has a message up to her cardiologist as well. BP came up to 102/65. Patient said she doesn't want to feel sluggish again tomorrow and taking both bp meds in the morning. I let her know that Dr. Mesfin Ackerman is seeing patients into the evening tonight but we will call her back when we have an answer for her.

## 2023-12-07 LAB — FRUCTOSAMINE SERPL-SCNC: 248 UMOL/L (ref 0–285)

## 2023-12-07 NOTE — TELEPHONE ENCOUNTER
Called and spoke to Elke Dunbar and advised Dr Marcia Machado message, pt verbally understood    Took BP at 10 am 100/65. Pt will call with update on BP readings.

## 2023-12-07 NOTE — TELEPHONE ENCOUNTER
Karen Banegas called awaiting to hear about her BP medication. /69  HR 91 without taking her metoprolol, and lisinopril. Karen Banegas also advised will take her BP again in about  1 hr to see what the BP is. She concerned cause she does not want to have DBP in the 60's again and was extremely tired and ended up sleeping the entire night away, also having no energy. She also advised that her PCP was going to reach out to Dr Gill Strong   Denies any lightheadedness, or dizziness    She would like to have this resolved.      C/B [phone]    Please advise

## 2023-12-07 NOTE — TELEPHONE ENCOUNTER
She should hold Jardiance and Lisinopril temporarily until back to eating normally.   Make sure to keep an eye on BP daily

## 2023-12-07 NOTE — TELEPHONE ENCOUNTER
She spoke with Cardiology, they told her to stop Lisinopril and Jardiance for now and to monitor BP.

## 2023-12-08 ENCOUNTER — TELEPHONE (OUTPATIENT)
Dept: CARDIOLOGY CLINIC | Facility: CLINIC | Age: 63
End: 2023-12-08

## 2023-12-08 NOTE — TELEPHONE ENCOUNTER
Karen Banegas called today at 730 am /73 HR 91  She had taken medications except her Jardiance and Lisinopril (didn't take)  @ 11 am /69 HR 80. Continues to have the fatigue and feel exhausted, started back to eating better. When she feels this way she can't think, trouble working, just not feeling well. Weight today 174.00 lbs (has not changes from the other day)    She wanted me to make you aware she has not been that weight since high school even after her weight surgery she was not that weight. She is wondering if her metoprolol needs to be adjusted? Maybe half tablet? Since her eating is better do you want her to start taking the lisinopril and Jardiance?     Please advise

## 2023-12-11 ENCOUNTER — TELEPHONE (OUTPATIENT)
Dept: CARDIOLOGY CLINIC | Facility: CLINIC | Age: 63
End: 2023-12-11

## 2023-12-11 NOTE — TELEPHONE ENCOUNTER
Called and spoke to Cookie Hdz and advised, verbally understood message read back instructions. Pt will call if any questions, and will follow up with pcp as directed. Following up at with her endo tomorrow.

## 2023-12-11 NOTE — TELEPHONE ENCOUNTER
She can cut Toprol to half tablet, and have her stop the lisinopril.   She should go back to [facility] and discuss with her pCP

## 2023-12-11 NOTE — TELEPHONE ENCOUNTER
[facility] called on Sat was ok, Sunday she was out shopping walking around 40 mins and felt weak    Today /72 HR 92   2 hrs later 116/71 HR 85    Still not taking the Jardiance , or lisinopril     She is asking should her metoprolol be adjusted.    Maybe do 1/2 tab     Please advise

## 2023-12-12 ENCOUNTER — OFFICE VISIT (OUTPATIENT)
Dept: ENDOCRINOLOGY | Facility: CLINIC | Age: 63
End: 2023-12-12
Payer: COMMERCIAL

## 2023-12-12 VITALS
HEART RATE: 89 BPM | BODY MASS INDEX: 27.46 KG/M2 | SYSTOLIC BLOOD PRESSURE: 112 MMHG | DIASTOLIC BLOOD PRESSURE: 72 MMHG | OXYGEN SATURATION: 96 % | HEIGHT: 68 IN | WEIGHT: 181.2 LBS

## 2023-12-12 DIAGNOSIS — E55.9 VITAMIN D DEFICIENCY: ICD-10-CM

## 2023-12-12 DIAGNOSIS — D64.9 LOW HEMOGLOBIN: ICD-10-CM

## 2023-12-12 DIAGNOSIS — E11.65 TYPE 2 DIABETES MELLITUS WITH HYPERGLYCEMIA, WITHOUT LONG-TERM CURRENT USE OF INSULIN (HCC): Primary | ICD-10-CM

## 2023-12-12 DIAGNOSIS — E78.00 PURE HYPERCHOLESTEROLEMIA: ICD-10-CM

## 2023-12-12 DIAGNOSIS — E61.1 IRON DEFICIENCY: ICD-10-CM

## 2023-12-12 DIAGNOSIS — I10 BENIGN ESSENTIAL HYPERTENSION: ICD-10-CM

## 2023-12-12 PROCEDURE — 99214 OFFICE O/P EST MOD 30 MIN: CPT

## 2023-12-12 NOTE — PROGRESS NOTES
Established Patient Progress Note    CC: Follow up for type 2 diabetes mellitus    Impression & Plan:    Problem List Items Addressed This Visit          Endocrine    Type 2 diabetes mellitus with hyperglycemia, without long-term current use of insulin (HCC) - Primary     Fructosamine level and blood sugar logs shows diabetes is well controlled which does not match hgbA1C. Due to history of low hgb and low iron, patient's A1C may not be reliable. Advise patient to start CGM to gather more information however she would like to wait until her new insurance starts in January. For now, continue with current regimen and resume Jardiance. Notify for BG < 70    Lab Results   Component Value Date    HGBA1C 8.2 (H) 12/06/2023          Relevant Orders    Fructosamine- Lab Collect    Lipid panel- Lab Collect       Cardiovascular and Mediastinum    Benign essential hypertension     Well controlled. Continue current regimen             Other    Hyperlipidemia     Controlled  - continue current regimen          Relevant Orders    Lipid panel- Lab Collect    Iron deficiency     Update iron levels. Order has been placed         Relevant Orders    Ambulatory Referral to Hematology / Oncology    Iron Panel (Includes Ferritin, Iron Sat%, Iron, and TIBC)    Vitamin D deficiency     Update vitamin D level. Order has been given          Relevant Orders    Vitamin D 25 hydroxy Lab Collect    Low hemoglobin     Check iron panel. Patient denies any bleeding from anywhere. She is on Plavix and low dose aspirin per cardiology. She reports she is UTD on her colonoscopy             Orders Placed This Encounter   Procedures    Vitamin D 25 hydroxy Lab Collect     Standing Status:   Future     Standing Expiration Date:   12/12/2024    Fructosamine- Lab Collect     Standing Status:   Future     Standing Expiration Date:   12/12/2024    Lipid panel- Lab Collect     This is a patient instruction:  This test requires patient fasting for 10-12 hours or longer. Drinking of black coffee or black tea is acceptable. Standing Status:   Future     Standing Expiration Date:   2024    Ambulatory Referral to Hematology / Oncology     Standing Status:   Future     Standing Expiration Date:   2024     Referral Priority:   Routine     Referral Type:   Consult - AMB     Referral Reason:   Specialty Services Required     Requested Specialty:   Hematology and Oncology     Number of Visits Requested:   1     Expiration Date:   2024       History of Present Illness:  Aguilar Caal is a 57 y.o. female with a history of type 2 diabetes (diagnosed 2023), hypertension, hyperlipidemia, obesity, Tramaine-en-Y gastric bypass surgery. Complications: miroalbuminuria. Last A1C was 8.2. Fructosamine 248. Home glucose monitoring: home glucose monitor     Patient reports morning fasting B's to low 100's. More recently, morning fasting are 130's to 140's     Hypoglycemia: denies  Recent hospitalizations or illnesses: bronchitis in a few weeks ago. Wasn't eating properly. More sedentary. She was also having low BP at this time and stopped Jardiance for 1 week. She will resume again tomorrow. Problems with current regimen: no     Current regimen:   Metformin 500 mg BID  Jardiance 10 mg daily  Tresiba 10 units daily     Last Eye Exam: needs to schedule  Last Foot Exam: UTD, 23     ACE/ARB: lisinopril  Has hyperlipidemia: Taking atorvastatin        Today she reports fatigue.     Patient Active Problem List   Diagnosis    Atrial fibrillation ([address])    Hyperlipidemia    Class 1 obesity without serious comorbidity with body mass index (BMI) of 30.0 to 30.9 in adult    Benign essential hypertension    Type 2 diabetes mellitus with hyperglycemia, without long-term current use of insulin (HCC)    Hyponatremia    Coronary artery disease involving native coronary artery    Status post insertion of drug eluting coronary artery stent    Iron deficiency    Vitamin D deficiency Low hemoglobin      Past Medical History:   Diagnosis Date    Diabetes mellitus ([address] ) 2023    Hypertension     Obesity     Had weight trell surgery      Past Surgical History:   Procedure Laterality Date    BARIATRIC SURGERY      CARDIAC CATHETERIZATION N/A 2023    Procedure: Cardiac Coronary Angiogram;  Surgeon: Jasmin Bass DO;  Location: [facility]; Service: Cardiology    CARDIAC CATHETERIZATION N/A 2023    Procedure: Cardiac pci;  Surgeon: Jasmin Bass DO;  Location: [facility]; Service: Cardiology     SECTION      32 years ago (2019)      Family History   Problem Relation Age of Onset    Arrhythmia Mother         atrial flutter    Skin cancer Mother     Hypertension Father     Hyperlipidemia Father     Atrial fibrillation Sister     Atrial fibrillation Brother     Other Brother         sarosis of liver    Breast cancer Maternal Grandmother     Throat cancer Maternal Grandfather     Heart attack Paternal Uncle     Stroke Neg Hx     Anuerysm Neg Hx     Clotting disorder Neg Hx     Heart failure Neg Hx     Coronary artery disease Neg Hx      Social History     Tobacco Use    Smoking status: Former     Packs/day: 1.00     Years: 20.00     Total pack years: 20.00     Types: Cigarettes     Quit date:      Years since quittin.9    Smokeless tobacco: Never    Tobacco comments:     20 years ago. Substance Use Topics    Alcohol use: Never     Allergies   Allergen Reactions    Nsaids Other (See Comments)     Weight loss surgery advise to not take          Current Outpatient Medications:     Aspirin Low Dose 81 MG chewable tablet, CHEW AND SWALLOW ONE TABLET BY MOUTH ONCE DAILY, Disp: 90 tablet, Rfl: 0    atorvastatin (LIPITOR) 40 mg tablet, Take 1 tablet (40 mg total) by mouth daily, Disp: 90 tablet, Rfl: 3    Blood Glucose Monitoring Suppl (OneTouch Verio Reflect) w/Device KIT, May substitute brand based on insurance coverage. Check glucose ACHS. , Disp: 1 kit, Rfl: 0    calcium citrate (CALCITRATE) 950 MG tablet, Take 1 tablet by mouth daily, Disp: , Rfl:     cholecalciferol (VITAMIN D3) 1,000 units tablet, Take 1,000 Units by mouth daily, Disp: , Rfl:     clopidogrel (PLAVIX) 75 mg tablet, Take 1 tablet (75 mg total) by mouth daily, Disp: 90 tablet, Rfl: 4    cyanocobalamin 100 MCG tablet, Take 100 mcg by mouth daily, Disp: , Rfl:     Empagliflozin (Jardiance) 10 MG TABS tablet, Take 1 tablet (10 mg total) by mouth every morning (Patient taking differently: Take 10 mg by mouth every morning Per PT said Cardiologist said to stop for now due to low BP), Disp: 90 tablet, Rfl: 2    famotidine (PEPCID) 20 mg tablet, Take 1 tablet (20 mg total) by mouth 2 (two) times a day, Disp: 180 tablet, Rfl: 0    glucose blood (FREESTYLE LITE) test strip, Use as instructed, Disp: 300 strip, Rfl: 3    glucose blood (OneTouch Verio) test strip, May substitute brand based on insurance coverage. Check glucose ACHS. , Disp: 200 each, Rfl: 0    insulin degludec [employer]ev [place] FlexTouch) 100 units/mL injection pen, 10 units daily. Dose change, Disp: 15 mL, Rfl: 0    Insulin Pen Needle (BD Pen Needle Trudy 2nd Gen) 32G X 4 MM MISC, For use with insulin pen. Pharmacy may dispense brand covered by insurance., Disp: 300 each, Rfl: 3    Lancets (freestyle) lancets, Use as instructed, Disp: 300 each, Rfl: 3    lisinopril (ZESTRIL) 2.5 mg tablet, Take 2.5 mg by mouth daily Per PT said Cardiologist said to stop for now due to low BP, Disp: , Rfl:     metFORMIN (GLUCOPHAGE) 500 mg tablet, 1 tab bid, Disp: 180 tablet, Rfl: 1    metoprolol succinate (TOPROL-XL) 25 mg 24 hr tablet, TAKE ONE TABLET BY MOUTH ONCE DAILY (Patient taking differently: 12.5 mg daily Ok per Dr Eitan Tesfaye telephone call to cut tablet in half.), Disp: 90 tablet, Rfl: 0    multivitamin (THERAGRAN) TABS, Take 1 tablet by mouth daily, Disp: , Rfl:     OneTouch Delica Lancets 24G MISC, May substitute brand based on insurance coverage. Check glucose ACHS. , Disp: 200 each, Rfl: 0    Review of Systems   Constitutional:  Negative for chills and fever. HENT:  Negative for ear pain and sore throat. Eyes:  Negative for pain and visual disturbance. Respiratory:  Negative for cough and shortness of breath. Cardiovascular:  Negative for chest pain and palpitations. Gastrointestinal:  Negative for abdominal pain and vomiting. Genitourinary:  Negative for dysuria and hematuria. Musculoskeletal:  Negative for arthralgias and back pain. Skin:  Negative for color change and rash. Neurological:  Negative for seizures and syncope. All other systems reviewed and are negative. Physical Exam:  Body mass index is 27.55 kg/m². /72 (BP Location: Left arm, Patient Position: Sitting, Cuff Size: Adult)   Pulse 89   Ht 5' 8" (1.727 m)   Wt 82.2 kg (181 lb 3.2 oz)   SpO2 96%   BMI 27.55 kg/m²    Wt Readings from Last 3 Encounters:   12/12/23 82.2 kg (181 lb 3.2 oz)   12/05/23 80.7 kg (178 lb)   11/29/23 82.3 kg (181 lb 6.4 oz)       Physical Exam  Vitals reviewed. Constitutional:       Appearance: Normal appearance. HENT:      Head: Normocephalic and atraumatic. Cardiovascular:      Rate and Rhythm: Normal rate. Heart sounds: Normal heart sounds. Pulmonary:      Effort: Pulmonary effort is normal.      Breath sounds: Normal breath sounds. Neurological:      Mental Status: She is alert and oriented to person, place, and time.    Psychiatric:         Mood and Affect: Mood normal.         Behavior: Behavior normal.       Diabetic Foot Exam    Labs:   Lab Results   Component Value Date    HGBA1C 8.2 (H) 12/06/2023    HGBA1C 8.2 (H) 07/13/2023    HGBA1C 10.0 (H) 06/05/2023     Lab Results   Component Value Date    CREATININE 0.95 12/06/2023    CREATININE 0.97 11/23/2023    CREATININE 1.09 07/13/2023    BUN 29 (H) 12/06/2023     08/01/2015    K 4.5 12/06/2023    CL 96 12/06/2023    CO2 26 12/06/2023     eGFR   Date Value Ref Range Status   12/06/2023 63 ml/min/1.73sq m Final     Lab Results   Component Value Date    CHOL 149 08/01/2015    HDL 45 (L) 07/13/2023    TRIG 88 07/13/2023     Lab Results   Component Value Date    ALT 10 11/23/2023    AST 11 (L) 11/23/2023    ALKPHOS 72 11/23/2023    BILITOT 0.54 08/01/2015     Lab Results   Component Value Date    WKR4WQSPZGAQ 1.660 07/13/2023    JQN1ZMSAHLCC 2.337 04/15/2023    [id number] 2.840 03/09/2019     No results found for: "Wyatt Kong", "SANDIEI"      There are no Patient Instructions on file for this visit. Discussed with the patient and all questioned fully answered. She will call me if any problems arise.

## 2023-12-12 NOTE — ASSESSMENT & PLAN NOTE
Fructosamine level and blood sugar logs shows diabetes is well controlled which does not match hgbA1C. Due to history of low hgb and low iron, patient's A1C may not be reliable. Advise patient to start CGM to gather more information however she would like to wait until her new insurance starts in January. For now, continue with current regimen and resume Jardiance.  Notify for BG < 70    Lab Results   Component Value Date    HGBA1C 8.2 (H) 12/06/2023

## 2023-12-12 NOTE — ASSESSMENT & PLAN NOTE
Check iron panel. Patient denies any bleeding from anywhere. She is on Plavix and low dose aspirin per cardiology.  She reports she is UTD on her colonoscopy

## 2023-12-13 ENCOUNTER — OFFICE VISIT (OUTPATIENT)
Dept: GYNECOLOGY | Facility: CLINIC | Age: 63
End: 2023-12-13
Payer: COMMERCIAL

## 2023-12-13 ENCOUNTER — TELEPHONE (OUTPATIENT)
Dept: HEMATOLOGY ONCOLOGY | Facility: CLINIC | Age: 63
End: 2023-12-13

## 2023-12-13 VITALS
HEIGHT: 68 IN | BODY MASS INDEX: 27.13 KG/M2 | WEIGHT: 179 LBS | SYSTOLIC BLOOD PRESSURE: 130 MMHG | DIASTOLIC BLOOD PRESSURE: 86 MMHG

## 2023-12-13 DIAGNOSIS — Z12.31 ENCOUNTER FOR SCREENING MAMMOGRAM FOR MALIGNANT NEOPLASM OF BREAST: ICD-10-CM

## 2023-12-13 DIAGNOSIS — Z78.0 POSTMENOPAUSAL: ICD-10-CM

## 2023-12-13 DIAGNOSIS — Z01.419 ROUTINE GYNECOLOGICAL EXAMINATION: ICD-10-CM

## 2023-12-13 DIAGNOSIS — Z01.419 ENCOUNTER FOR WELL WOMAN EXAM: Primary | ICD-10-CM

## 2023-12-13 DIAGNOSIS — Z12.39 ENCOUNTER FOR SCREENING BREAST EXAMINATION: ICD-10-CM

## 2023-12-13 DIAGNOSIS — Z11.51 SCREENING FOR HPV (HUMAN PAPILLOMAVIRUS): ICD-10-CM

## 2023-12-13 DIAGNOSIS — B37.31 YEAST VAGINITIS: ICD-10-CM

## 2023-12-13 DIAGNOSIS — Z12.4 CERVICAL CANCER SCREENING: ICD-10-CM

## 2023-12-13 PROCEDURE — G0476 HPV COMBO ASSAY CA SCREEN: HCPCS | Performed by: PHYSICIAN ASSISTANT

## 2023-12-13 PROCEDURE — S0610 ANNUAL GYNECOLOGICAL EXAMINA: HCPCS | Performed by: PHYSICIAN ASSISTANT

## 2023-12-13 PROCEDURE — G0145 SCR C/V CYTO,THINLAYER,RESCR: HCPCS | Performed by: PHYSICIAN ASSISTANT

## 2023-12-13 NOTE — TELEPHONE ENCOUNTER
I called Marco Jackson in response to a referral that was received for patient to establish care with Hematology. Outreach was made to schedule a consultation. I left a voicemail explaining the reason for my call and advised patient to call [facility] at [phone]. Another attempt will be made to contact patient.

## 2023-12-13 NOTE — PROGRESS NOTES
Assessment/Plan:    No problem-specific Assessment & Plan notes found for this encounter. Diagnoses and all orders for this visit:    Encounter for well woman exam    Encounter for screening breast examination    Cervical cancer screening    Screening for HPV (human papillomavirus)  -     Liquid-based pap, screening    Postmenopausal    Yeast vaginitis  -     terconazole (TERAZOL 7) 0.4 % vaginal cream; Insert 1 applicator into the vagina daily at bedtime  -     terconazole (TERAZOL 7) 0.4 % vaginal cream; Insert 1 applicator into the vagina daily at bedtime    Routine gynecological examination  -     Liquid-based pap, screening    Encounter for screening mammogram for malignant neoplasm of breast  -     Mammo screening bilateral w 3d & cad; Future          Subjective:      Patient ID: Queen Pa is a 70 y.o. female. Pt presents as a new old pt for her annual exam today--  She has no major gyn complaints, occ yeast infx  She has no bleeding or pelvic pain  Bowel and bladder are regular  Colonoscopy--~ 10 years  No breast concerns today  Last mammo--due  Dexa--due  Gastric bypass ~ 13 years ago    pap today. Rx mammo  Rx terazol 7 cream prn  Witch hazel  Probiotic/yogurt        The following portions of the patient's history were reviewed and updated as appropriate: allergies, current medications, past family history, past medical history, past social history, past surgical history, and problem list.    Review of Systems   Constitutional:  Negative for chills, fever and unexpected weight change. HENT:  Negative for ear pain and sore throat. Eyes:  Negative for pain and visual disturbance. Respiratory:  Negative for cough and shortness of breath. Cardiovascular:  Negative for chest pain and palpitations. Gastrointestinal:  Negative for abdominal pain, blood in stool, constipation, diarrhea and vomiting. Genitourinary: Negative. Negative for dysuria and hematuria.    Musculoskeletal: Negative for arthralgias and back pain. Skin:  Negative for color change and rash. Neurological:  Negative for seizures and syncope. All other systems reviewed and are negative. Objective:      /86   Ht 5' 8" (1.727 m)   Wt 81.2 kg (179 lb)   BMI 27.22 kg/m²          Physical Exam  Vitals and nursing note reviewed. Constitutional:       Appearance: Normal appearance. She is well-developed. HENT:      Head: Normocephalic and atraumatic. Chest:   Breasts:     Right: No inverted nipple, mass, nipple discharge or skin change. Left: No inverted nipple, mass, nipple discharge or skin change. Abdominal:      Palpations: Abdomen is soft. Genitourinary:     Exam position: Supine. Labia:         Right: No rash, tenderness or lesion. Left: No rash, tenderness or lesion. Vagina: Erythema present. Cervix: No cervical motion tenderness, discharge or friability. Adnexa:         Right: No mass, tenderness or fullness. Left: No mass, tenderness or fullness. Comments: +erythema b/l   Scant thick white d/c  Musculoskeletal:      Cervical back: Normal range of motion. Lymphadenopathy:      Lower Body: No right inguinal adenopathy. No left inguinal adenopathy. Neurological:      Mental Status: She is alert.

## 2023-12-15 ENCOUNTER — TELEPHONE (OUTPATIENT)
Age: 63
End: 2023-12-15

## 2023-12-15 NOTE — TELEPHONE ENCOUNTER
Spoke with pt to relay Dr Karen Fraser message. Offered appt with Dr Kyra Ponce for [address] 12/19 @ 4:00, but pt said she cannot take off from work again. Offered appt on Friday 12/22, but pt said that is too far out. She said she had already restarted the Flonase, but will continue on it. If she does not get better, she said she will have to go to an urgent care.     Routed to Dr Kyra Ponce

## 2023-12-15 NOTE — TELEPHONE ENCOUNTER
Meaghan Marks called in, she's still not feeling any better, she finished the antibiotic. Now her ears feel clogged, feels like it's popping, and she's still coughing up phlegm. She's not sure what to do next, please advise.

## 2023-12-17 DIAGNOSIS — E11.65 TYPE 2 DIABETES MELLITUS WITH HYPERGLYCEMIA, WITHOUT LONG-TERM CURRENT USE OF INSULIN (HCC): ICD-10-CM

## 2023-12-18 ENCOUNTER — TELEPHONE (OUTPATIENT)
Dept: ENDOCRINOLOGY | Facility: CLINIC | Age: 63
End: 2023-12-18

## 2023-12-18 LAB
HPV HR 12 DNA CVX QL NAA+PROBE: NEGATIVE
HPV16 DNA CVX QL NAA+PROBE: NEGATIVE
HPV18 DNA CVX QL NAA+PROBE: NEGATIVE

## 2023-12-18 NOTE — TELEPHONE ENCOUNTER
"Pt calling with possible concern of side effect from Jardiance. States she feels \"off\" when she takes it- tired, a little lightheaded, maybe a little shaky. She forgot to take it yesterday, and she felt better all day. She knows it is supposed to be protecting her heart, but she thinks she would prefer not to take it.  "

## 2023-12-20 LAB
LAB AP GYN PRIMARY INTERPRETATION: NORMAL
Lab: NORMAL

## 2023-12-27 ENCOUNTER — APPOINTMENT (OUTPATIENT)
Dept: LAB | Facility: MEDICAL CENTER | Age: 63
End: 2023-12-27
Payer: COMMERCIAL

## 2023-12-27 DIAGNOSIS — E78.00 PURE HYPERCHOLESTEROLEMIA: ICD-10-CM

## 2023-12-27 DIAGNOSIS — E11.65 TYPE 2 DIABETES MELLITUS WITH HYPERGLYCEMIA, WITHOUT LONG-TERM CURRENT USE OF INSULIN (HCC): ICD-10-CM

## 2023-12-27 DIAGNOSIS — E55.9 VITAMIN D DEFICIENCY: ICD-10-CM

## 2023-12-27 DIAGNOSIS — R73.9 HYPERGLYCEMIA: ICD-10-CM

## 2023-12-27 DIAGNOSIS — E61.1 IRON DEFICIENCY: ICD-10-CM

## 2023-12-27 LAB
25(OH)D3 SERPL-MCNC: 39.7 NG/ML (ref 30–100)
FERRITIN SERPL-MCNC: 164 NG/ML (ref 11–307)
IRON SATN MFR SERPL: 13 % (ref 15–50)
IRON SERPL-MCNC: 33 UG/DL (ref 50–212)
TIBC SERPL-MCNC: 258 UG/DL (ref 250–450)
UIBC SERPL-MCNC: 225 UG/DL (ref 155–355)

## 2023-12-27 PROCEDURE — 83540 ASSAY OF IRON: CPT

## 2023-12-27 PROCEDURE — 36415 COLL VENOUS BLD VENIPUNCTURE: CPT

## 2023-12-27 PROCEDURE — 82728 ASSAY OF FERRITIN: CPT

## 2023-12-27 PROCEDURE — 82306 VITAMIN D 25 HYDROXY: CPT

## 2023-12-27 PROCEDURE — 83550 IRON BINDING TEST: CPT

## 2023-12-27 RX ORDER — LANCETS 28 GAUGE
EACH MISCELLANEOUS
Qty: 300 EACH | Refills: 3 | Status: SHIPPED | OUTPATIENT
Start: 2023-12-27

## 2023-12-27 RX ORDER — INSULIN DEGLUDEC INJECTION 100 U/ML
INJECTION, SOLUTION SUBCUTANEOUS
Qty: 15 ML | Refills: 0 | Status: SHIPPED | OUTPATIENT
Start: 2023-12-27

## 2023-12-29 ENCOUNTER — APPOINTMENT (OUTPATIENT)
Dept: LAB | Facility: MEDICAL CENTER | Age: 63
End: 2023-12-29
Payer: COMMERCIAL

## 2023-12-29 LAB
CHOLEST SERPL-MCNC: 130 MG/DL
HDLC SERPL-MCNC: 41 MG/DL
LDLC SERPL CALC-MCNC: 63 MG/DL (ref 0–100)
NONHDLC SERPL-MCNC: 89 MG/DL
TRIGL SERPL-MCNC: 129 MG/DL

## 2023-12-29 PROCEDURE — 80061 LIPID PANEL: CPT

## 2023-12-29 PROCEDURE — 36415 COLL VENOUS BLD VENIPUNCTURE: CPT

## 2024-01-02 ENCOUNTER — TELEPHONE (OUTPATIENT)
Dept: ENDOCRINOLOGY | Facility: CLINIC | Age: 64
End: 2024-01-02

## 2024-01-02 NOTE — TELEPHONE ENCOUNTER
----- Message from FRANKLYN Quintanilla sent at 1/2/2024  9:04 AM EST -----  Cholesterol levels are stable. Continue current regimen

## 2024-01-17 ENCOUNTER — OFFICE VISIT (OUTPATIENT)
Dept: HEMATOLOGY ONCOLOGY | Facility: CLINIC | Age: 64
End: 2024-01-17
Payer: COMMERCIAL

## 2024-01-17 VITALS
HEART RATE: 101 BPM | TEMPERATURE: 97.9 F | OXYGEN SATURATION: 97 % | WEIGHT: 180 LBS | SYSTOLIC BLOOD PRESSURE: 128 MMHG | HEIGHT: 68 IN | RESPIRATION RATE: 16 BRPM | DIASTOLIC BLOOD PRESSURE: 82 MMHG | BODY MASS INDEX: 27.28 KG/M2

## 2024-01-17 DIAGNOSIS — Z98.84 H/O GASTRIC BYPASS: ICD-10-CM

## 2024-01-17 DIAGNOSIS — D64.9 ANEMIA, UNSPECIFIED TYPE: Primary | ICD-10-CM

## 2024-01-17 DIAGNOSIS — Z83.2 FAMILY HISTORY OF THALASSEMIA: ICD-10-CM

## 2024-01-17 DIAGNOSIS — E61.1 IRON DEFICIENCY: ICD-10-CM

## 2024-01-17 PROCEDURE — 99204 OFFICE O/P NEW MOD 45 MIN: CPT | Performed by: INTERNAL MEDICINE

## 2024-01-17 NOTE — PROGRESS NOTES
Amanda Benavidez  1960  [address] [facility] [place]  [address]  [place] [address]    Chief Complaint   Patient presents with    Consult     Assessment/plan:   1.  Normocytic anemia  2.  History of Tramaine-en-Y gastric bypass surgery    Amanda Benavidez is a 63-year-old female with past medical history significant for hyperlipidemia, atrial fibrillation, HTN, diabetes type 2, CAD. She has a history of gastric bypass surgery in 2015. Patient referred to hematology for iron deficiency anemia.  Patient for history, chart, prior labs.  Patient noted to have baseline hemoglobin ranging from 11-13 g/deciliter since at least 2014.  Recent lab from November 2023 noted hemoglobin of 11.4 g/deciliter with adequate iron stores.  Her ferritin on 12/623 was 567 then decreased to 164 on 12/27/2023.  Patient does note being ill at that time which could account for elevated ferritin as it is an acute phase reactant.  At this juncture, recommend patient have updated CBC and iron panel to assess for iron deficiency.  If patient noted to have low iron stores, will recommend IV iron to increase absorption due to history of gastric bypass.  We discussed side effects of IV iron including, but not limited to hypo/hypertension, hypersensitivity reaction, anaphylaxis reaction and headaches. I will also check vitamin B12 and folate levels.  Will check hemoglobinopathy panel given family history of thalassemia.  She will have labs checked now and call for results.  Will continue to follow in the office in 4 months.    History of present illness:   Amanda Benavidez is a 63-year-old female with past medical history significant for hyperlipidemia, atrial fibrillation, HTN, diabetes type 2, CAD.  Patient referred to hematology for iron deficiency anemia.  She has a history of gastric bypass surgery in 2015. Pt presents for initial hematology consultation accompanied by her .     Pt notes  being ill in November 2023. Now feels better. Blood sugars are better controlled.   Mild fatigue, improved from November 2023. Notes cold intolerance since having gastric surgery. Taking vitamin B12 in gummy form. Does not take iron.    Denies any dark stools, blood in stools or bleeding.     6/28/2013: Colonoscopy: No obvious diverticuli.  No visualized polyps.    Family hx significant for thalassemia.     Review of systems:   Review of Systems   Constitutional:  Negative for chills, fatigue and fever.   Eyes:  Negative for pain and visual disturbance.   Respiratory:  Negative for cough and shortness of breath.    Cardiovascular:  Negative for chest pain and palpitations.   Gastrointestinal:  Negative for abdominal pain and vomiting.   Genitourinary:  Negative for dysuria and hematuria.   Musculoskeletal:  Negative for arthralgias and back pain.   Skin:  Negative for color change and rash.   Neurological:  Negative for seizures and syncope.   All other systems reviewed and are negative.    Patient Active Problem List   Diagnosis    Atrial fibrillation (HCC)    Hyperlipidemia    Class 1 obesity without serious comorbidity with body mass index (BMI) of 30.0 to 30.9 in adult    Benign essential hypertension    Type 2 diabetes mellitus with hyperglycemia, without long-term current use of insulin (HCC)    Hyponatremia    Coronary artery disease involving native coronary artery    Status post insertion of drug eluting coronary artery stent    Iron deficiency    Vitamin D deficiency    Low hemoglobin     Past Medical History:   Diagnosis Date    Diabetes mellitus (HCC) 4 2023    Hypertension     Obesity     Had weight trell surgery     Past Surgical History:   Procedure Laterality Date    BARIATRIC SURGERY  2015    CARDIAC CATHETERIZATION N/A 6/5/2023    Procedure: Cardiac Coronary Angiogram;  Surgeon: Syed Baltazar DO;  [facility];  Service: Cardiology    CARDIAC CATHETERIZATION N/A 6/5/2023     Procedure: Cardiac pci;  Surgeon: Syed Baltazar DO;  [facility];  Service: Cardiology     SECTION      32 years ago (2019)     Family History   Problem Relation Age of Onset    Arrhythmia Mother         atrial flutter    Skin cancer Mother     Hypertension Father     Hyperlipidemia Father     Atrial fibrillation Sister     Atrial fibrillation Brother     Other Brother         sarosis of liver    Breast cancer Maternal Grandmother     Throat cancer Maternal Grandfather     Heart attack Paternal Uncle     Stroke Neg Hx     Anuerysm Neg Hx     Clotting disorder Neg Hx     Heart failure Neg Hx     Coronary artery disease Neg Hx      Social History     Socioeconomic History    Marital status: /Civil Union     Spouse name: Not on file    Number of children: Not on file    Years of education: Not on file    Highest education level: Not on file   Occupational History    Not on file   Tobacco Use    Smoking status: Former     Current packs/day: 0.00     Average packs/day: 1 pack/day for 20.0 years (20.0 ttl pk-yrs)     Types: Cigarettes     Start date:      Quit date:      Years since quittin.0    Smokeless tobacco: Never    Tobacco comments:     20 years ago.   Vaping Use    Vaping status: Never Used   Substance and Sexual Activity    Alcohol use: Never    Drug use: Never    Sexual activity: Not Currently     Partners: Male     Birth control/protection: Post-menopausal   Other Topics Concern    Not on file   Social History Narrative    Not on file     Social Determinants of Health     Financial Resource Strain: Not on file   Food Insecurity: Not on file   Transportation Needs: Not on file   Physical Activity: Not on file   Stress: Not on file (2021)   Social Connections: Not on file   Intimate Partner Violence: Low Risk  (2021)    Received from [facility]    Intimate Partner Violence     Insults You: Not on file     Threatens You: Not on file     Screams  at You: Not on file     Physically Hurt: Not on file     Intimate Partner Violence Score: Not on file   Housing Stability: Not on file       Current Outpatient Medications:     Aspirin Low Dose 81 MG chewable tablet, CHEW AND SWALLOW ONE TABLET BY MOUTH ONCE DAILY, Disp: 90 tablet, Rfl: 0    atorvastatin (LIPITOR) 40 mg tablet, Take 1 tablet (40 mg total) by mouth daily, Disp: 90 tablet, Rfl: 3    Blood Glucose Monitoring Suppl (OneTouch Verio Reflect) w/Device KIT, May substitute brand based on insurance coverage. Check glucose ACHS., Disp: 1 kit, Rfl: 0    calcium citrate (CALCITRATE) 950 MG tablet, Take 1 tablet by mouth daily, Disp: , Rfl:     cholecalciferol (VITAMIN D3) 1,000 units tablet, Take 1,000 Units by mouth daily, Disp: , Rfl:     clopidogrel (PLAVIX) 75 mg tablet, Take 1 tablet (75 mg total) by mouth daily, Disp: 90 tablet, Rfl: 4    cyanocobalamin 100 MCG tablet, Take 100 mcg by mouth daily, Disp: , Rfl:     glucose blood (FREESTYLE LITE) test strip, Use as instructed, Disp: 300 strip, Rfl: 3    glucose blood (OneTouch Verio) test strip, May substitute brand based on insurance coverage. Check glucose ACHS., Disp: 200 each, Rfl: 0    insulin degludec (Tresiba FlexTouch) 100 units/mL injection pen, 10 units daily. Dose change, Disp: 15 mL, Rfl: 0    Insulin Pen Needle (BD Pen Needle Trudy 2nd Gen) 32G X 4 MM MISC, For use with insulin pen. Pharmacy may dispense brand covered by insurance., Disp: 300 each, Rfl: 3    Lancets (freestyle) lancets, Use as instructed, Disp: 300 each, Rfl: 3    metFORMIN (GLUCOPHAGE) 500 mg tablet, TAKE ONE TABLET BY MOUTH TWICE DAILY, Disp: 180 tablet, Rfl: 0    metoprolol succinate (TOPROL-XL) 25 mg 24 hr tablet, TAKE ONE TABLET BY MOUTH ONCE DAILY (Patient taking differently: 12.5 mg daily Ok per Dr Fisher telephone call to cut tablet in half.), Disp: 90 tablet, Rfl: 0    multivitamin (THERAGRAN) TABS, Take 1 tablet by mouth daily, Disp: , Rfl:     OneTouch Delica Lancets  33G MISC, May substitute brand based on insurance coverage. Check glucose ACHS., Disp: 200 each, Rfl: 0    Empagliflozin (Jardiance) 10 MG TABS tablet, Take 1 tablet (10 mg total) by mouth every morning (Patient taking differently: Take 10 mg by mouth every morning Per PT said Cardiologist said to stop for now due to low BP), Disp: 90 tablet, Rfl: 2    famotidine (PEPCID) 20 mg tablet, Take 1 tablet (20 mg total) by mouth 2 (two) times a day (Patient not taking: Reported on 1/17/2024), Disp: 180 tablet, Rfl: 0    lisinopril (ZESTRIL) 2.5 mg tablet, Take 2.5 mg by mouth daily Per PT said Cardiologist said to stop for now due to low BP (Patient not taking: Reported on 1/17/2024), Disp: , Rfl:     terconazole (TERAZOL 7) 0.4 % vaginal cream, Insert 1 applicator into the vagina daily at bedtime (Patient not taking: Reported on 1/17/2024), Disp: 45 g, Rfl: 0    terconazole (TERAZOL 7) 0.4 % vaginal cream, Insert 1 applicator into the vagina daily at bedtime (Patient not taking: Reported on 1/17/2024), Disp: 45 g, Rfl: 2  Allergies   Allergen Reactions    Nsaids Other (See Comments)     Weight loss surgery advise to not take      Vitals:    01/17/24 0935   BP: 128/82   Pulse: 101   Resp: 16   Temp: 97.9 °F (36.6 °C)   SpO2: 97%       Wt Readings from Last 3 Encounters:   01/17/24 81.6 kg (180 lb)   12/13/23 81.2 kg (179 lb)   12/12/23 82.2 kg (181 lb 3.2 oz)       Physical Exam  Vitals reviewed.   Constitutional:       General: She is not in acute distress.     Appearance: Normal appearance.   HENT:      Head: Normocephalic and atraumatic.      Mouth/Throat:      Mouth: Mucous membranes are moist.   Eyes:      Extraocular Movements: Extraocular movements intact.      Conjunctiva/sclera: Conjunctivae normal.   Cardiovascular:      Rate and Rhythm: Normal rate.   Pulmonary:      Effort: Pulmonary effort is normal. No respiratory distress.      Breath sounds: No wheezing, rhonchi or rales.   Abdominal:      General: Abdomen  is flat. There is no distension.      Palpations: Abdomen is soft.   Musculoskeletal:      Cervical back: Normal range of motion and neck supple.      Right lower leg: No edema.      Left lower leg: No edema.   Skin:     General: Skin is warm.      Coloration: Skin is not pale.   Neurological:      Mental Status: She is alert and oriented to person, place, and time. Mental status is at baseline.      Cranial Nerves: No cranial nerve deficit.   Psychiatric:         Thought Content: Thought content normal.     Labs:  3/24/2016: Ferritin: 115, iron: 78, iron saturation: 21%, TIBC: 363.  Folate:> 20  3/9/2019: WBC: 5.61, H/H: 13.1/39.5, MCV: 91, platelets: 205  4/15/2023: WBC: 7.29, H/H: 11/32.9, MCV: 89, platelets: 384  11/23/2023: WBC: 6.77, H/H: 11.4/36.3, MCV: 92, platelets: 364.  12/6/2023: Ferritin: 567, iron: 17, iron saturation: 9%, TIBC: 185  12/27/2023: Ferritin: 164, iron: 33, iron saturation: 13%, TIBC: 258    Orders entered this encounter:  - CBC and differential; Future  - Iron Panel (Includes Ferritin, Iron Sat%, Iron, and TIBC); Future  - Vitamin B12; Future  - Folate; Future  - Thalassemia and Hemoglobinopathy Comp; Future  - Protein electrophoresis, serum; Future    Return in about 4 months (around 5/17/2024) for Office Visit, Labs - See Treatment Plan.  1. Check labs now.   2. FU in 4 months for office visit. Pt prefers [facility]

## 2024-01-26 ENCOUNTER — APPOINTMENT (OUTPATIENT)
Dept: LAB | Facility: MEDICAL CENTER | Age: 64
End: 2024-01-26
Payer: COMMERCIAL

## 2024-01-26 DIAGNOSIS — Z98.84 H/O GASTRIC BYPASS: ICD-10-CM

## 2024-01-26 DIAGNOSIS — D64.9 ANEMIA, UNSPECIFIED TYPE: ICD-10-CM

## 2024-01-26 DIAGNOSIS — Z83.2 FAMILY HISTORY OF THALASSEMIA: ICD-10-CM

## 2024-01-26 DIAGNOSIS — E61.1 IRON DEFICIENCY: ICD-10-CM

## 2024-01-26 LAB
BASOPHILS # BLD AUTO: 0.02 THOUSANDS/ÂΜL (ref 0–0.1)
BASOPHILS NFR BLD AUTO: 0 % (ref 0–1)
EOSINOPHIL # BLD AUTO: 0.1 THOUSAND/ÂΜL (ref 0–0.61)
EOSINOPHIL NFR BLD AUTO: 2 % (ref 0–6)
ERYTHROCYTE [DISTWIDTH] IN BLOOD BY AUTOMATED COUNT: 14.8 % (ref 11.6–15.1)
FERRITIN SERPL-MCNC: 71 NG/ML (ref 11–307)
HCT VFR BLD AUTO: 28.8 % (ref 34.8–46.1)
HGB BLD-MCNC: 8.6 G/DL (ref 11.5–15.4)
IMM GRANULOCYTES # BLD AUTO: 0.01 THOUSAND/UL (ref 0–0.2)
IMM GRANULOCYTES NFR BLD AUTO: 0 % (ref 0–2)
IRON SATN MFR SERPL: 11 % (ref 15–50)
IRON SERPL-MCNC: 33 UG/DL (ref 50–212)
LYMPHOCYTES # BLD AUTO: 1.2 THOUSANDS/ÂΜL (ref 0.6–4.47)
LYMPHOCYTES NFR BLD AUTO: 24 % (ref 14–44)
MCH RBC QN AUTO: 27 PG (ref 26.8–34.3)
MCHC RBC AUTO-ENTMCNC: 29.9 G/DL (ref 31.4–37.4)
MCV RBC AUTO: 90 FL (ref 82–98)
MONOCYTES # BLD AUTO: 0.45 THOUSAND/ÂΜL (ref 0.17–1.22)
MONOCYTES NFR BLD AUTO: 9 % (ref 4–12)
NEUTROPHILS # BLD AUTO: 3.27 THOUSANDS/ÂΜL (ref 1.85–7.62)
NEUTS SEG NFR BLD AUTO: 65 % (ref 43–75)
NRBC BLD AUTO-RTO: 0 /100 WBCS
PLATELET # BLD AUTO: 349 THOUSANDS/UL (ref 149–390)
PMV BLD AUTO: 9.7 FL (ref 8.9–12.7)
RBC # BLD AUTO: 3.19 MILLION/UL (ref 3.81–5.12)
TIBC SERPL-MCNC: 312 UG/DL (ref 250–450)
UIBC SERPL-MCNC: 279 UG/DL (ref 155–355)
WBC # BLD AUTO: 5.05 THOUSAND/UL (ref 4.31–10.16)

## 2024-01-26 PROCEDURE — 82728 ASSAY OF FERRITIN: CPT

## 2024-01-26 PROCEDURE — 85025 COMPLETE CBC W/AUTO DIFF WBC: CPT

## 2024-01-26 PROCEDURE — 85041 AUTOMATED RBC COUNT: CPT | Performed by: INTERNAL MEDICINE

## 2024-01-26 PROCEDURE — 86334 IMMUNOFIX E-PHORESIS SERUM: CPT

## 2024-01-26 PROCEDURE — 85018 HEMOGLOBIN: CPT | Performed by: INTERNAL MEDICINE

## 2024-01-26 PROCEDURE — 83020 HEMOGLOBIN ELECTROPHORESIS: CPT | Performed by: INTERNAL MEDICINE

## 2024-01-26 PROCEDURE — 83540 ASSAY OF IRON: CPT

## 2024-01-26 PROCEDURE — 84165 PROTEIN E-PHORESIS SERUM: CPT

## 2024-01-26 PROCEDURE — 36415 COLL VENOUS BLD VENIPUNCTURE: CPT

## 2024-01-26 PROCEDURE — 83550 IRON BINDING TEST: CPT

## 2024-01-26 PROCEDURE — 85014 HEMATOCRIT: CPT | Performed by: INTERNAL MEDICINE

## 2024-01-30 LAB
ALBUMIN SERPL ELPH-MCNC: 3 G/DL (ref 3.2–5.1)
ALBUMIN SERPL ELPH-MCNC: 50.9 % (ref 48–70)
ALPHA1 GLOB SERPL ELPH-MCNC: 0.35 G/DL (ref 0.15–0.47)
ALPHA1 GLOB SERPL ELPH-MCNC: 6 % (ref 1.8–7)
ALPHA2 GLOB SERPL ELPH-MCNC: 0.93 G/DL (ref 0.42–1.04)
ALPHA2 GLOB SERPL ELPH-MCNC: 15.8 % (ref 5.9–14.9)
BETA GLOB ABNORMAL SERPL ELPH-MCNC: 0.44 G/DL (ref 0.31–0.57)
BETA1 GLOB SERPL ELPH-MCNC: 7.4 % (ref 4.7–7.7)
BETA2 GLOB SERPL ELPH-MCNC: 7.4 % (ref 3.1–7.9)
BETA2+GAMMA GLOB SERPL ELPH-MCNC: 0.44 G/DL (ref 0.2–0.58)
GAMMA GLOB ABNORMAL SERPL ELPH-MCNC: 0.74 G/DL (ref 0.4–1.66)
GAMMA GLOB SERPL ELPH-MCNC: 12.5 % (ref 6.9–22.3)
IGG/ALB SER: 1.04 {RATIO} (ref 1.1–1.8)
INTERPRETATION UR IFE-IMP: NORMAL
PROT PATTERN SERPL ELPH-IMP: ABNORMAL
PROT SERPL-MCNC: 5.9 G/DL (ref 6.4–8.2)

## 2024-01-30 PROCEDURE — 84165 PROTEIN E-PHORESIS SERUM: CPT | Performed by: PATHOLOGY

## 2024-01-30 PROCEDURE — 86334 IMMUNOFIX E-PHORESIS SERUM: CPT | Performed by: PATHOLOGY

## 2024-02-03 LAB — MISCELLANEOUS LAB TEST RESULT: NORMAL

## 2024-02-08 ENCOUNTER — OFFICE VISIT (OUTPATIENT)
Dept: FAMILY MEDICINE CLINIC | Facility: MEDICAL CENTER | Age: 64
End: 2024-02-08
Payer: COMMERCIAL

## 2024-02-08 ENCOUNTER — TELEPHONE (OUTPATIENT)
Dept: FAMILY MEDICINE CLINIC | Facility: MEDICAL CENTER | Age: 64
End: 2024-02-08

## 2024-02-08 VITALS
WEIGHT: 187.2 LBS | RESPIRATION RATE: 16 BRPM | TEMPERATURE: 99 F | BODY MASS INDEX: 28.37 KG/M2 | DIASTOLIC BLOOD PRESSURE: 78 MMHG | OXYGEN SATURATION: 98 % | HEIGHT: 68 IN | SYSTOLIC BLOOD PRESSURE: 140 MMHG | HEART RATE: 82 BPM

## 2024-02-08 DIAGNOSIS — E11.65 TYPE 2 DIABETES MELLITUS WITH HYPERGLYCEMIA, WITHOUT LONG-TERM CURRENT USE OF INSULIN (HCC): ICD-10-CM

## 2024-02-08 DIAGNOSIS — D64.9 NORMOCHROMIC ANEMIA: ICD-10-CM

## 2024-02-08 DIAGNOSIS — I10 BENIGN ESSENTIAL HYPERTENSION: ICD-10-CM

## 2024-02-08 DIAGNOSIS — I48.0 PAROXYSMAL ATRIAL FIBRILLATION (HCC): ICD-10-CM

## 2024-02-08 DIAGNOSIS — Z12.11 SCREEN FOR COLON CANCER: ICD-10-CM

## 2024-02-08 DIAGNOSIS — E78.00 PURE HYPERCHOLESTEROLEMIA: ICD-10-CM

## 2024-02-08 DIAGNOSIS — E55.9 VITAMIN D DEFICIENCY: ICD-10-CM

## 2024-02-08 DIAGNOSIS — I25.10 CORONARY ARTERY DISEASE INVOLVING NATIVE CORONARY ARTERY OF NATIVE HEART WITHOUT ANGINA PECTORIS: ICD-10-CM

## 2024-02-08 DIAGNOSIS — Z09 FOLLOW-UP EXAM, 3-6 MONTHS SINCE PREVIOUS EXAM: Primary | ICD-10-CM

## 2024-02-08 PROCEDURE — 99214 OFFICE O/P EST MOD 30 MIN: CPT | Performed by: STUDENT IN AN ORGANIZED HEALTH CARE EDUCATION/TRAINING PROGRAM

## 2024-02-08 NOTE — TELEPHONE ENCOUNTER
----- Message from Laure Thomas DO sent at 2/8/2024  8:49 AM EST -----  Care gap request influenza vaccine Khloe Causey fall 2023

## 2024-02-08 NOTE — PROGRESS NOTES
[facility] - Clinic Note  Laure Thomas, DO, 24     Amanda Benavidez MRN: [id number] : 1960 Age: 63 y.o.     Assessment/Plan     1. Follow-up exam, 3-6 months since previous exam    -Patient presents for medical follow-up  -Immunizations reviewed:  Patient states she completed influenza vaccine this season, will obtain record  -Counseled about newest COVID-19 vaccine  -Counseled about new RSV vaccine  -Counseled about PCV 20 vaccine  -Counseled about Shingrix vaccination series  -Counseled about tetanus booster    2. Type 2 diabetes mellitus with hyperglycemia, without long-term current use of insulin (HCC)    -Managed by Endocrinology  -Metformin 500 mg p.o. twice daily patient confirms taking   -Clarify about remaining regimen including Jardiance as patient states she is not taking today?  -I have reviewed pertinent labs:  Hemoglobin A1C/EST AVG Glucose   Lab Results   Component Value Date    HGBA1C 8.2 (H) 2023     2023   -Reminded about yearly dilated eye exam  -daily statin    3. Paroxysmal atrial fibrillation (HCC)    -beta blocker  -Following with Cardiology    4. Coronary artery disease involving native coronary artery of native heart without angina pectoris    -Following with Cardiology  -Continue antihypertensive regimen  -Continue daily statin  -She is continued on dual antiplatelet therapy at this time    5. Pure hypercholesterolemia    - I have reviewed pertinent labs:  Lipid Profile:   Lab Results   Component Value Date    CHOLESTEROL 130 2023    HDL 41 (L) 2023    TRIG 129 2023    LDLCALC 63 2023    NONHDLC 89 2023       6. Benign essential hypertension    -Patient states she is taking metoprolol 12.5 mg p.o. daily    7. Vitamin D deficiency    -Continue vitamin D supplement    8. BMI 28.0-28.9,adult    -Continue diet and lifestyle modifications    9. Normochromic anemia    -Had consultation with Hematology    10. Screen for  colon cancer    - Ambulatory Referral to Gastroenterology; Future    Amanda Benavidez acknowledged understanding of treatment plan, all questions answered.  Return to office in 6 months and sooner as needed.    Subjective      Amanda Benavidez is a 63 y.o. female with past medical history including but not limited to type 2 diabetes mellitus, paroxysmal atrial fibrillation, CAD, hyperlipidemia, hypertension, vitamin D deficiency, normocytic anemia, that is post bariatric surgery who presents today for medical follow-up.  Patient feeling well today.    The following portions of the patient's history were reviewed and updated as appropriate: allergies, current medications, past family history, past medical history, past social history, past surgical history and problem list.     Past Medical History:   Diagnosis Date    Diabetes mellitus (HCC) 2023    Hypertension     Obesity     Had weight trell surgery       Allergies   Allergen Reactions    Nsaids Other (See Comments)     Weight loss surgery advise to not take        Past Surgical History:   Procedure Laterality Date    BARIATRIC SURGERY      CARDIAC CATHETERIZATION N/A 2023    Procedure: Cardiac Coronary Angiogram;  Surgeon: Syed Baltazar DO;  [facility]: [facility];  Service: Cardiology    CARDIAC CATHETERIZATION N/A 2023    Procedure: Cardiac pci;  Surgeon: Syed Baltazar DO;  [facility];  Service: Cardiology     SECTION      32 years ago (2019)       Family History   Problem Relation Age of Onset    Arrhythmia Mother         atrial flutter    Skin cancer Mother     Hypertension Father     Hyperlipidemia Father     Atrial fibrillation Sister     Atrial fibrillation Brother     Other Brother         sarosis of liver    Breast cancer Maternal Grandmother     Throat cancer Maternal Grandfather     Heart attack Paternal Uncle     Stroke Neg Hx     Anuerysm Neg Hx     Clotting disorder Neg Hx     Heart failure Neg  Hx     Coronary artery disease Neg Hx        Social History     Socioeconomic History    Marital status: /Civil Union     Spouse name: Not on file    Number of children: Not on file    Years of education: Not on file    Highest education level: Not on file   Occupational History    Not on file   Tobacco Use    Smoking status: Former     Current packs/day: 0.00     Average packs/day: 1 pack/day for 20.0 years (20.0 ttl pk-yrs)     Types: Cigarettes     Start date:      Quit date:      Years since quittin.1    Smokeless tobacco: Never    Tobacco comments:     20 years ago.   Vaping Use    Vaping status: Never Used   Substance and Sexual Activity    Alcohol use: Never    Drug use: Never    Sexual activity: Not Currently     Partners: Male     Birth control/protection: Post-menopausal   Other Topics Concern    Not on file   Social History Narrative    Not on file     Social Determinants of Health     Financial Resource Strain: Not on file   Food Insecurity: Not on file   Transportation Needs: Not on file   Physical Activity: Not on file   Stress: Not on file (2021)   Social Connections: Not on file   Intimate Partner Violence: Low Risk  (2021)    Received from [facility]    Intimate Partner Violence     Insults You: Not on file     Threatens You: Not on file     Screams at You: Not on file     Physically Hurt: Not on file     Intimate Partner Violence Score: Not on file   Housing Stability: Not on file       Current Outpatient Medications   Medication Sig Dispense Refill    Aspirin Low Dose 81 MG chewable tablet CHEW AND SWALLOW ONE TABLET BY MOUTH ONCE DAILY 90 tablet 0    atorvastatin (LIPITOR) 40 mg tablet Take 1 tablet (40 mg total) by mouth daily 90 tablet 3    Blood Glucose Monitoring Suppl (OneTouch Verio Reflect) w/Device KIT May substitute brand based on insurance coverage. Check glucose ACHS. 1 kit 0    calcium citrate (CALCITRATE) 950 MG tablet Take 1 tablet by mouth daily       cholecalciferol (VITAMIN D3) 1,000 units tablet Take 1,000 Units by mouth daily      clopidogrel (PLAVIX) 75 mg tablet Take 1 tablet (75 mg total) by mouth daily 90 tablet 4    cyanocobalamin 100 MCG tablet Take 100 mcg by mouth daily      Empagliflozin (Jardiance) 10 MG TABS tablet Take 1 tablet (10 mg total) by mouth every morning (Patient taking differently: Take 10 mg by mouth every morning Per PT said Cardiologist said to stop for now due to low BP) 90 tablet 2    famotidine (PEPCID) 20 mg tablet Take 1 tablet (20 mg total) by mouth 2 (two) times a day (Patient not taking: Reported on 1/17/2024) 180 tablet 0    glucose blood (FREESTYLE LITE) test strip Use as instructed 300 strip 3    glucose blood (OneTouch Verio) test strip May substitute brand based on insurance coverage. Check glucose ACHS. 200 each 0    insulin degludec (Tresiba FlexTouch) 100 units/mL injection pen 10 units daily. Dose change 15 mL 0    Insulin Pen Needle (BD Pen Needle Trudy 2nd Gen) 32G X 4 MM MISC For use with insulin pen. Pharmacy may dispense brand covered by insurance. 300 each 3    Lancets (freestyle) lancets Use as instructed 300 each 3    lisinopril (ZESTRIL) 2.5 mg tablet Take 2.5 mg by mouth daily Per PT said Cardiologist said to stop for now due to low BP (Patient not taking: Reported on 1/17/2024)      metFORMIN (GLUCOPHAGE) 500 mg tablet TAKE ONE TABLET BY MOUTH TWICE DAILY 180 tablet 0    metoprolol succinate (TOPROL-XL) 25 mg 24 hr tablet TAKE ONE TABLET BY MOUTH ONCE DAILY (Patient taking differently: 12.5 mg daily Ok per Dr Fisher telephone call to cut tablet in half.) 90 tablet 0    multivitamin (THERAGRAN) TABS Take 1 tablet by mouth daily      OneTouch Delica Lancets 33G MISC May substitute brand based on insurance coverage. Check glucose ACHS. 200 each 0    terconazole (TERAZOL 7) 0.4 % vaginal cream Insert 1 applicator into the vagina daily at bedtime (Patient not taking: Reported on 1/17/2024) 45 g 0     "terconazole (TERAZOL 7) 0.4 % vaginal cream Insert 1 applicator into the vagina daily at bedtime (Patient not taking: Reported on 1/17/2024) 45 g 2     No current facility-administered medications for this visit.       Review of Systems     As noted in HPI    Objective      /78 (BP Location: Left arm, Patient Position: Sitting, Cuff Size: Standard)   Pulse 82   Temp 99 °F (37.2 °C) (Temporal)   Resp 16   Ht 5' 8\" (1.727 m)   Wt 84.9 kg (187 lb 3.2 oz)   SpO2 98%   BMI 28.46 kg/m²     Physical Exam  Vitals reviewed.   Constitutional:       General: She is not in acute distress.     Appearance: Normal appearance.   HENT:      Head: Normocephalic and atraumatic.      Right Ear: External ear normal.      Left Ear: External ear normal.      Nose: Nose normal.      Mouth/Throat:      Mouth: Mucous membranes are moist.      Pharynx: Oropharynx is clear.   Eyes:      Extraocular Movements: Extraocular movements intact.      Conjunctiva/sclera: Conjunctivae normal.      Pupils: Pupils are equal, round, and reactive to light.   Cardiovascular:      Rate and Rhythm: Normal rate and regular rhythm.      Pulses: Normal pulses.      Heart sounds: Normal heart sounds.   Pulmonary:      Effort: Pulmonary effort is normal.      Breath sounds: Normal breath sounds.   Abdominal:      General: Abdomen is flat. Bowel sounds are normal.      Palpations: Abdomen is soft.      Tenderness: There is no abdominal tenderness.   Musculoskeletal:      Cervical back: Neck supple.      Right lower leg: No edema.      Left lower leg: No edema.   Lymphadenopathy:      Cervical: No cervical adenopathy.   Skin:     General: Skin is warm and dry.      Capillary Refill: Capillary refill takes less than 2 seconds.   Neurological:      Mental Status: She is alert and oriented to person, place, and time.   Psychiatric:         Mood and Affect: Mood normal.         Behavior: Behavior normal.         Thought Content: Thought content normal.      " "       Some portions of this record may have been generated with voice recognition software. There may be translation, syntax, or grammatical errors. Occasional wrong word or \"sound-a-like\" substitutions may have occurred due to the inherent limitations of the voice recognition software. Read the chart carefully and recognize, using context, where substations may have occurred. If you have any questions, please contact the dictating provider for clarification or correction, as needed.  "

## 2024-02-11 NOTE — TELEPHONE ENCOUNTER
02/11/24 2:20 PM        The office's non-staff message must be re-sent as a staff message in order for the quality request to be processed per the approved workflow.     Thank you  Brianna Ring          Thank you  Brianna Ring

## 2024-02-15 ENCOUNTER — HOSPITAL ENCOUNTER (OUTPATIENT)
Dept: RADIOLOGY | Facility: MEDICAL CENTER | Age: 64
Discharge: HOME | End: 2024-02-15
Payer: COMMERCIAL

## 2024-02-15 VITALS — HEIGHT: 68 IN | BODY MASS INDEX: 28.34 KG/M2 | WEIGHT: 187 LBS

## 2024-02-15 DIAGNOSIS — Z12.31 ENCOUNTER FOR SCREENING MAMMOGRAM FOR MALIGNANT NEOPLASM OF BREAST: ICD-10-CM

## 2024-02-15 PROCEDURE — 77067 SCR MAMMO BI INCL CAD: CPT

## 2024-02-15 PROCEDURE — 77063 BREAST TOMOSYNTHESIS BI: CPT

## 2024-02-19 ENCOUNTER — TELEPHONE (OUTPATIENT)
Dept: CARDIOLOGY CLINIC | Facility: CLINIC | Age: 64
End: 2024-02-19

## 2024-02-19 NOTE — TELEPHONE ENCOUNTER
Amanda called, she advised that last week ate pickles. This week is noticing left ankle swelling. Has not be able to check her BP, machine not working properly.   Denies any fatigued, or SOB    Today she had a mid chest near left breast pain naresh or twinge. Did resolve and has not come back. But is little worried. She did advise her dog had jumped on her.   Metoprolol 12.5 mg qd    Scheduled an appt next week with Crista FABIAN     Please advise

## 2024-02-20 NOTE — TELEPHONE ENCOUNTER
Called and spoke to pt and advised Dr Fisher would like an appt Thursday in the [place] office at 840 am. Pt verbally understood  Sent Suzanna UMAÑA a message via teams to add pt.

## 2024-02-21 DIAGNOSIS — R94.39 POSITIVE CARDIAC STRESS TEST: ICD-10-CM

## 2024-02-21 DIAGNOSIS — I42.9 CARDIOMYOPATHY (HCC): ICD-10-CM

## 2024-02-21 RX ORDER — METOPROLOL SUCCINATE 25 MG/1
TABLET, EXTENDED RELEASE ORAL
Qty: 90 TABLET | Refills: 0 | Status: SHIPPED | OUTPATIENT
Start: 2024-02-21 | End: 2024-02-22 | Stop reason: SDUPTHER

## 2024-02-22 ENCOUNTER — TELEPHONE (OUTPATIENT)
Age: 64
End: 2024-02-22

## 2024-02-22 ENCOUNTER — OFFICE VISIT (OUTPATIENT)
Dept: CARDIOLOGY CLINIC | Facility: CLINIC | Age: 64
End: 2024-02-22
Payer: COMMERCIAL

## 2024-02-22 VITALS
DIASTOLIC BLOOD PRESSURE: 80 MMHG | WEIGHT: 186.5 LBS | HEART RATE: 83 BPM | HEIGHT: 68 IN | BODY MASS INDEX: 28.26 KG/M2 | OXYGEN SATURATION: 99 % | SYSTOLIC BLOOD PRESSURE: 160 MMHG

## 2024-02-22 DIAGNOSIS — Z95.5 STATUS POST INSERTION OF DRUG ELUTING CORONARY ARTERY STENT: ICD-10-CM

## 2024-02-22 DIAGNOSIS — E78.00 PURE HYPERCHOLESTEROLEMIA: ICD-10-CM

## 2024-02-22 DIAGNOSIS — I10 BENIGN ESSENTIAL HYPERTENSION: ICD-10-CM

## 2024-02-22 DIAGNOSIS — E11.65 TYPE 2 DIABETES MELLITUS WITH HYPERGLYCEMIA, WITHOUT LONG-TERM CURRENT USE OF INSULIN (HCC): ICD-10-CM

## 2024-02-22 DIAGNOSIS — I48.91 ATRIAL FIBRILLATION, UNSPECIFIED TYPE (HCC): Primary | ICD-10-CM

## 2024-02-22 DIAGNOSIS — I42.9 CARDIOMYOPATHY (HCC): ICD-10-CM

## 2024-02-22 DIAGNOSIS — I25.10 CORONARY ARTERY DISEASE INVOLVING NATIVE CORONARY ARTERY OF NATIVE HEART WITHOUT ANGINA PECTORIS: ICD-10-CM

## 2024-02-22 PROCEDURE — 99215 OFFICE O/P EST HI 40 MIN: CPT | Performed by: INTERNAL MEDICINE

## 2024-02-22 PROCEDURE — 93000 ELECTROCARDIOGRAM COMPLETE: CPT | Performed by: INTERNAL MEDICINE

## 2024-02-22 RX ORDER — METOPROLOL SUCCINATE 25 MG/1
25 TABLET, EXTENDED RELEASE ORAL DAILY
Start: 2024-02-22

## 2024-02-22 RX ORDER — ASPIRIN 81 MG/1
81 TABLET, CHEWABLE ORAL DAILY
Qty: 90 TABLET | Refills: 0 | Status: SHIPPED | OUTPATIENT
Start: 2024-02-22

## 2024-02-22 RX ORDER — LISINOPRIL AND HYDROCHLOROTHIAZIDE 12.5; 1 MG/1; MG/1
1 TABLET ORAL DAILY
Qty: 30 TABLET | Refills: 6 | Status: SHIPPED | OUTPATIENT
Start: 2024-02-22

## 2024-02-22 NOTE — PROGRESS NOTES
[facility] Cardiology  Follow up note  Amanda Benavidez 63 y.o. female MRN: [id number]        Problems    1. Atrial fibrillation, unspecified type (HCC)  POCT ECG      2. Coronary artery disease involving native coronary artery of native heart without angina pectoris        3. Pure hypercholesterolemia        4. Benign essential hypertension  lisinopril-hydrochlorothiazide (PRINZIDE,ZESTORETIC) 10-12.5 MG per tablet    Basic metabolic panel      5. Status post insertion of drug eluting coronary artery stent        6. Type 2 diabetes mellitus with hyperglycemia, without long-term current use of insulin (Formerly [facility])        7. Cardiomyopathy (HCC)  metoprolol succinate (TOPROL-XL) 25 mg 24 hr tablet          Impression:    Coronary artery disease  6/23, after an ER visit for chest pain, she was discovered to have a 95% mid LAD tandem lesion status post drug-eluting stent x 1  Continues on aspirin and Plavix  She has had some atypical chest discomfort over the last week or so. Might be explained by high BP, ECG today is normal    Diabetes  Her weight has plateaued, she recently gained about 6 pounds, she is 186 pounds  She lost a significant amount of weight however over the course of last year  A1c was as high as 14, has improved to 8.2, still not ideally controlled  Poor tolerance of Jardiance even at 10 mg, she was getting vaginal infections, and dizziness.    PAF  Only ever had 1 remote episode in the context of morbid obesity prior to bariatric surgery    Lipids  Remains excellent with LDL 63, triglycerides normal, HDL borderline at 41    Obesity  She lost a significant amount of weight which plateaued December 2023, now slightly increased to 186 pounds    HTN  Previously on lisinopril/HCTZ  Summer 2023 it was discontinued when Jardiance was added  Blood pressures are now poorly controlled, she is gained back about 6 pounds, some of that may be water weight considering the ankle edema  With Jardiance discontinued, and  lisinopril/HCTZ not reintroduced, this probably explains her uncontrolled hypertension      Plan:    Resume lisinopril/HCTZ, but at a lower dose considering she is 10 pounds lighter than what she was back in October, at that time she was on 20/25 mg, we will start with 10/12.5 mg, with a BMP in 1 week and very close follow-up in 2 weeks to ensure improvement of her blood pressure, and assess the need for further titration if needed back to her prior dose  I will not pursue any cardiovascular testing just yet considering her intervention was less than 9 months ago  Her EKG today was stable  However I warned her that any change in symptoms should be taken seriously, she is to call the office right away if there is a change prior to our next appointment in a couple weeks, especially considering she is still technically an uncontrolled diabetic and now off of Jardiance.        HPI:   Amanda Benavidez is a 63 y.o. year old female with a history of severe obesity status post bariatric surgery remotely, a remote history of PAF in the setting of obesity, without any recurrence, hypertension and hyperlipidemia, severely uncontrolled diabetes with an A1c up to 14 in early 2023, coronary artery disease discovered in that setting with unstable anginal symptoms that prompted workup last spring discovering 95% tandem proximal LAD lesion status post drug-eluting stent x 1 and still on dual antiplatelet therapy.    Her diabetes has improved dramatically over the course of 2023, A1c got down to 8.2 but plateaued there, and ultimately could not tolerate Jardiance for multiple reasons, it was discontinued.  Unfortunately lisinopril/HCTZ was not reintroduced, this had been discontinued when Jardiance was introduced due to low blood pressure.  Hypertension is now a problem, she called mainly because she developed ankle swelling last week after eating some pickles, she is gained about 6 to 7 pounds over the course of the last month, and she  developed slight occasional chest discomfort which was fleeting, nonradiating, nonexertional, and she states her dog jumped on her last week so she was wondering if it was more musculoskeletal.  Her EKG today is completely normal.  The chest symptoms have actually improved over the course of the last couple days.  Her cholesterol is fantastic.      Review of Systems   Constitutional:  Negative for appetite change, diaphoresis, fatigue and fever.   Respiratory:  Positive for chest tightness. Negative for shortness of breath and wheezing.    Cardiovascular:  Positive for leg swelling. Negative for chest pain and palpitations.   Gastrointestinal:  Negative for abdominal pain and blood in stool.   Musculoskeletal:  Negative for arthralgias and joint swelling.   Skin:  Negative for rash.   Neurological:  Negative for dizziness, syncope and light-headedness.   All other systems reviewed and are negative.        Past Medical History:   Diagnosis Date   • Diabetes mellitus (HCC) 4    • Hypertension    • Obesity     Had weight trell surgery     Social History     Substance and Sexual Activity   Alcohol Use Never     Social History     Substance and Sexual Activity   Drug Use Never     Social History     Tobacco Use   Smoking Status Former   • Current packs/day: 0.00   • Average packs/day: 1 pack/day for 20.0 years (20.0 ttl pk-yrs)   • Types: Cigarettes   • Start date:    • Quit date:    • Years since quittin.1   Smokeless Tobacco Never   Tobacco Comments    20 years ago.       Allergies:  Allergies   Allergen Reactions   • Nsaids Other (See Comments)     Weight loss surgery advise to not take        Medications:     Current Outpatient Medications:   •  Aspirin Low Dose 81 MG chewable tablet, CHEW AND SWALLOW ONE TABLET BY MOUTH ONCE DAILY, Disp: 90 tablet, Rfl: 0  •  atorvastatin (LIPITOR) 40 mg tablet, Take 1 tablet (40 mg total) by mouth daily, Disp: 90 tablet, Rfl: 3  •  calcium citrate (CALCITRATE) 950  MG tablet, Take 1 tablet by mouth daily, Disp: , Rfl:   •  cholecalciferol (VITAMIN D3) 1,000 units tablet, Take 1,000 Units by mouth daily, Disp: , Rfl:   •  clopidogrel (PLAVIX) 75 mg tablet, Take 1 tablet (75 mg total) by mouth daily, Disp: 90 tablet, Rfl: 4  •  cyanocobalamin 100 MCG tablet, Take 100 mcg by mouth daily, Disp: , Rfl:   •  insulin degludec (Tresiba FlexTouch) 100 units/mL injection pen, 10 units daily. Dose change, Disp: 15 mL, Rfl: 0  •  Insulin Pen Needle (BD Pen Needle Trudy 2nd Gen) 32G X 4 MM MISC, For use with insulin pen. Pharmacy may dispense brand covered by insurance., Disp: 300 each, Rfl: 3  •  lisinopril-hydrochlorothiazide (PRINZIDE,ZESTORETIC) 10-12.5 MG per tablet, Take 1 tablet by mouth daily, Disp: 30 tablet, Rfl: 6  •  metFORMIN (GLUCOPHAGE) 500 mg tablet, TAKE ONE TABLET BY MOUTH TWICE DAILY, Disp: 180 tablet, Rfl: 0  •  metoprolol succinate (TOPROL-XL) 25 mg 24 hr tablet, Take 1 tablet (25 mg total) by mouth daily, Disp: , Rfl:   •  multivitamin (THERAGRAN) TABS, Take 1 tablet by mouth daily, Disp: , Rfl:   •  glucose blood (FREESTYLE LITE) test strip, Use as instructed, Disp: 300 strip, Rfl: 3  •  Lancets (freestyle) lancets, Use as instructed, Disp: 300 each, Rfl: 3      Vitals:    02/22/24 0844   BP: 160/80   Pulse: 83   SpO2: 99%       Weight (last 2 days)     Date/Time Weight    02/22/24 0844 84.6 (186.5)        Physical Exam  Constitutional:       General: She is not in acute distress.     Appearance: She is well-developed. She is not diaphoretic.   HENT:      Head: Normocephalic and atraumatic.   Eyes:      General: No scleral icterus.     Conjunctiva/sclera: Conjunctivae normal.      Pupils: Pupils are equal, round, and reactive to light.   Neck:      Thyroid: No thyromegaly.      Vascular: No carotid bruit or JVD.      Trachea: No tracheal deviation.   Cardiovascular:      Rate and Rhythm: Normal rate and regular rhythm.      Heart sounds: Normal heart sounds. No murmur  "heard.     No friction rub. No gallop.   Pulmonary:      Effort: Pulmonary effort is normal. No respiratory distress.      Breath sounds: Normal breath sounds. No wheezing or rales.   Abdominal:      General: Bowel sounds are normal. There is no distension.      Palpations: Abdomen is soft.      Tenderness: There is no abdominal tenderness.   Musculoskeletal:         General: No tenderness or deformity. Normal range of motion.      Cervical back: Normal range of motion and neck supple.      Right lower leg: No edema.      Left lower leg: No edema.   Skin:     General: Skin is warm and dry.      Findings: No erythema or rash.   Neurological:      Mental Status: She is alert and oriented to person, place, and time.      Cranial Nerves: No cranial nerve deficit.   Psychiatric:         Judgment: Judgment normal.           Laboratory Studies:  NT-proBNP: No results for input(s): \"NTBNP\" in the last 72 hours.   Chemistries: No results for input(s): \"NA\", \"K\", \"CL\", \"CO2\", \"BUN\" in the last 72 hours.    Invalid input(s): \"CREA\", \"GLU\"  Lipid Profile:   Lab Results   Component Value Date    CHOL 149 08/01/2015    CHOL 142 04/25/2015    CHOL 188 09/13/2014     Lab Results   Component Value Date    HDL 41 (L) 12/29/2023    HDL 45 (L) 07/13/2023    HDL 48 (L) 06/06/2023     Lab Results   Component Value Date    LDLCALC 63 12/29/2023    LDLCALC 71 07/13/2023    LDLCALC 84 06/06/2023     Lab Results   Component Value Date    TRIG 129 12/29/2023    TRIG 88 07/13/2023    TRIG 81 06/06/2023     Lab Results   Component Value Date    CHOL 149 08/01/2015    LDLDIRECT 77 06/06/2023    HDL 41 (L) 12/29/2023    HDL 59 08/01/2015    TRIG 129 12/29/2023    TRIG 73 08/01/2015     No results for input(s): \"CHOL\", \"LDLDIRECT\", \"HDL\", \"TRIG\" in the last 72 hours.    Cardiac testing:     EKG reviewed personally:    Results for orders placed or performed in visit on 02/22/24   POCT ECG    Impression    Normal sinus rhythm, normal EKG " "      Echocardiogram  5/23-personally reviewed-EF normal, no regional wall motion abnormality, normal diastolic dysfunction, no significant valve disease.    Stress Test  5/23-abnormal stress echo with anterior apical ischemia    Cardiac Cath  6/5/23-95% mid LAD status post drug-eluting stent    Luis Fisher MD    Portions of the record may have been created with voice recognition software.  Occasional wrong word or \"sound a like\" substitutions may have occurred due to the inherent limitations of voice recognition software.  Read the chart carefully and recognize, using context, where substitutions have occurred.  "

## 2024-02-27 DIAGNOSIS — E78.00 PURE HYPERCHOLESTEROLEMIA: ICD-10-CM

## 2024-02-27 RX ORDER — ATORVASTATIN CALCIUM 40 MG/1
40 TABLET, FILM COATED ORAL DAILY
Qty: 30 TABLET | Refills: 5 | Status: SHIPPED | OUTPATIENT
Start: 2024-02-27

## 2024-03-07 ENCOUNTER — APPOINTMENT (OUTPATIENT)
Dept: LAB | Facility: MEDICAL CENTER | Age: 64
End: 2024-03-07
Payer: COMMERCIAL

## 2024-03-07 DIAGNOSIS — I10 BENIGN ESSENTIAL HYPERTENSION: ICD-10-CM

## 2024-03-07 LAB
ANION GAP SERPL CALCULATED.3IONS-SCNC: 8 MMOL/L
BUN SERPL-MCNC: 23 MG/DL (ref 5–25)
CALCIUM SERPL-MCNC: 9.4 MG/DL (ref 8.4–10.2)
CHLORIDE SERPL-SCNC: 97 MMOL/L (ref 96–108)
CO2 SERPL-SCNC: 32 MMOL/L (ref 21–32)
CREAT SERPL-MCNC: 0.78 MG/DL (ref 0.6–1.3)
GFR SERPL CREATININE-BSD FRML MDRD: 80 ML/MIN/1.73SQ M
GLUCOSE P FAST SERPL-MCNC: 162 MG/DL (ref 65–99)
POTASSIUM SERPL-SCNC: 3.8 MMOL/L (ref 3.5–5.3)
SODIUM SERPL-SCNC: 137 MMOL/L (ref 135–147)

## 2024-03-07 PROCEDURE — 36415 COLL VENOUS BLD VENIPUNCTURE: CPT

## 2024-03-07 PROCEDURE — 80048 BASIC METABOLIC PNL TOTAL CA: CPT

## 2024-03-13 ENCOUNTER — OFFICE VISIT (OUTPATIENT)
Dept: CARDIOLOGY CLINIC | Facility: CLINIC | Age: 64
End: 2024-03-13
Payer: COMMERCIAL

## 2024-03-13 VITALS
OXYGEN SATURATION: 99 % | DIASTOLIC BLOOD PRESSURE: 72 MMHG | SYSTOLIC BLOOD PRESSURE: 128 MMHG | HEART RATE: 81 BPM | HEIGHT: 68 IN | BODY MASS INDEX: 27.31 KG/M2 | WEIGHT: 180.2 LBS

## 2024-03-13 DIAGNOSIS — I10 BENIGN ESSENTIAL HYPERTENSION: ICD-10-CM

## 2024-03-13 DIAGNOSIS — E11.65 TYPE 2 DIABETES MELLITUS WITH HYPERGLYCEMIA, WITHOUT LONG-TERM CURRENT USE OF INSULIN (HCC): ICD-10-CM

## 2024-03-13 DIAGNOSIS — I48.0 PAROXYSMAL ATRIAL FIBRILLATION (HCC): Primary | ICD-10-CM

## 2024-03-13 DIAGNOSIS — I25.10 CORONARY ARTERY DISEASE INVOLVING NATIVE CORONARY ARTERY OF NATIVE HEART WITHOUT ANGINA PECTORIS: ICD-10-CM

## 2024-03-13 DIAGNOSIS — Z95.5 STATUS POST INSERTION OF DRUG ELUTING CORONARY ARTERY STENT: ICD-10-CM

## 2024-03-13 DIAGNOSIS — E78.00 PURE HYPERCHOLESTEROLEMIA: ICD-10-CM

## 2024-03-13 PROCEDURE — 93000 ELECTROCARDIOGRAM COMPLETE: CPT | Performed by: INTERNAL MEDICINE

## 2024-03-13 PROCEDURE — 99214 OFFICE O/P EST MOD 30 MIN: CPT | Performed by: INTERNAL MEDICINE

## 2024-03-13 NOTE — ASSESSMENT & PLAN NOTE
A single remote episode occurred in the context of morbid obesity prior to bariatric surgery and has never recurred.

## 2024-03-13 NOTE — ASSESSMENT & PLAN NOTE
New diagnosis 6/23, ER visit at that time for chest pain, 95% tandem LAD lesion status post drug-eluting stent x 1  Continues on aspirin and Plavix  She has had some recent atypical chest discomfort.

## 2024-03-13 NOTE — PROGRESS NOTES
[facility] Cardiology  Follow up note  Amanda Benavidez 63 y.o. female MRN: [id number]        Atrial fibrillation (HCC)  A single remote episode occurred in the context of morbid obesity prior to bariatric surgery and has never recurred.    Coronary artery disease involving native coronary artery  New diagnosis 6/23, ER visit at that time for chest pain, 95% tandem LAD lesion status post drug-eluting stent x 1  Continues on aspirin and Plavix  She has had some recent atypical chest discomfort.    Hyperlipidemia  Really well-controlled, HDL borderline at 41, triglycerides normal, LDL 63    Benign essential hypertension  Previously on lisinopril/HCTZ  This was discontinued over the summer in favor of Jardiance for diabetic control  Blood pressures are currently poorly controlled  Jardiance has since been discontinued  We resumed lisinopril/HCTZ 10/12.5 mg, follow-up BMP last week was normal and blood pressure is back to 128/72.    Type 2 diabetes mellitus with hyperglycemia, without long-term current use of insulin (HCC)    Lab Results   Component Value Date    HGBA1C 8.2 (H) 12/06/2023   Did not tolerate Jardiance, it was discontinued  Currently on metformin/insulin     Plan:    PCP does full routine blood work, I will not order anything additional  She is awaiting further workup from endocrinology for diabetic control on her current regimen  She is actually lost a couple more pounds, happy to see that, she is down to 180 pounds.  I will see her back in 6 months, no further cardiovascular testing necessary at this time, and we will discuss eventual discontinuation of clopidogrel, but not until at least the end of 2024.        HPI:   Amanda Benavidez is a 63 y.o. year old female with a history of severe obesity status post bariatric surgery remotely, a remote history of PAF in the setting of obesity, without any recurrence, hypertension and hyperlipidemia, severely uncontrolled diabetes with an A1c up to 14 in early 2023,  coronary artery disease discovered in that setting with unstable anginal symptoms that prompted workup last spring discovering 95% tandem proximal LAD lesion status post drug-eluting stent x 1 and still on dual antiplatelet therapy.    She has continued atypical, noncardiac description, nonradiating, nonexertional left-sided chest discomfort that can last an entire day and recently improved with Tylenol.  Her EKG today is normal, she had similar symptoms 2 months ago at the time of uncontrolled hypertension, EKG at that time was normal.    Blood pressure improved nicely with the addition of lisinopril/HCTZ, and because of her lower body weight and excellent weight loss, she only required a dose of 10/12.5 mg, previously on 20/25 mg.  Follow-up blood work was normal.    Lipids remain well-controlled.    She has updated endocrine/diabetic testing this week, and then plan follow-up with Dr. Saab.    She lost a few more pounds, she is down to 180 pounds.      Review of Systems   Constitutional:  Negative for appetite change, diaphoresis, fatigue and fever.   Respiratory:  Negative for chest tightness, shortness of breath and wheezing.    Cardiovascular:  Positive for chest pain. Negative for palpitations and leg swelling.   Gastrointestinal:  Negative for abdominal pain and blood in stool.   Musculoskeletal:  Negative for arthralgias and joint swelling.   Skin:  Negative for rash.   Neurological:  Negative for dizziness, syncope and light-headedness.         Past Medical History:   Diagnosis Date   • Diabetes mellitus (HCC) 4 2023   • Hypertension    • Obesity     Had weight trell surgery     Social History     Substance and Sexual Activity   Alcohol Use Never     Social History     Substance and Sexual Activity   Drug Use Never     Social History     Tobacco Use   Smoking Status Former   • Current packs/day: 0.00   • Average packs/day: 1 pack/day for 20.0 years (20.0 ttl pk-yrs)   • Types: Cigarettes   • Start  date:    • Quit date:    • Years since quittin.2   Smokeless Tobacco Never   Tobacco Comments    20 years ago.       Allergies:  Allergies   Allergen Reactions   • Nsaids Other (See Comments)     Weight loss surgery advise to not take        Medications:     Current Outpatient Medications:   •  aspirin 81 mg chewable tablet, CHEW AND SWALLOW ONE TABLET BY MOUTH ONCE DAILY, Disp: 90 tablet, Rfl: 0  •  atorvastatin (LIPITOR) 40 mg tablet, Take 1 tablet (40 mg total) by mouth daily, Disp: 30 tablet, Rfl: 5  •  calcium citrate (CALCITRATE) 950 MG tablet, Take 1 tablet by mouth daily, Disp: , Rfl:   •  cholecalciferol (VITAMIN D3) 1,000 units tablet, Take 1,000 Units by mouth daily, Disp: , Rfl:   •  clopidogrel (PLAVIX) 75 mg tablet, Take 1 tablet (75 mg total) by mouth daily, Disp: 90 tablet, Rfl: 4  •  cyanocobalamin 100 MCG tablet, Take 100 mcg by mouth daily, Disp: , Rfl:   •  glucose blood (FREESTYLE LITE) test strip, Use as instructed, Disp: 300 strip, Rfl: 3  •  insulin degludec (Tresiba FlexTouch) 100 units/mL injection pen, 10 units daily. Dose change, Disp: 15 mL, Rfl: 0  •  Insulin Pen Needle (BD Pen Needle Trudy 2nd Gen) 32G X 4 MM MISC, For use with insulin pen. Pharmacy may dispense brand covered by insurance., Disp: 300 each, Rfl: 3  •  Lancets (freestyle) lancets, Use as instructed, Disp: 300 each, Rfl: 3  •  lisinopril-hydrochlorothiazide (PRINZIDE,ZESTORETIC) 10-12.5 MG per tablet, Take 1 tablet by mouth daily, Disp: 30 tablet, Rfl: 6  •  metFORMIN (GLUCOPHAGE) 500 mg tablet, TAKE ONE TABLET BY MOUTH TWICE DAILY, Disp: 180 tablet, Rfl: 0  •  metoprolol succinate (TOPROL-XL) 25 mg 24 hr tablet, Take 1 tablet (25 mg total) by mouth daily, Disp: , Rfl:   •  multivitamin (THERAGRAN) TABS, Take 1 tablet by mouth daily, Disp: , Rfl:       Vitals:    24 1400   BP: 128/72   Pulse: 81   SpO2: 99%       Weight (last 2 days)     Date/Time Weight    03/13/24 1400 81.7 (180.2)        Physical  "Exam  Constitutional:       General: She is not in acute distress.     Appearance: Normal appearance. She is not diaphoretic.   HENT:      Head: Normocephalic and atraumatic.   Eyes:      General: No scleral icterus.     Conjunctiva/sclera: Conjunctivae normal.   Neck:      Vascular: No JVD.   Cardiovascular:      Rate and Rhythm: Normal rate and regular rhythm.      Heart sounds: Normal heart sounds. No murmur heard.  Pulmonary:      Effort: Pulmonary effort is normal. No respiratory distress.      Breath sounds: Normal breath sounds. No wheezing, rhonchi or rales.   Musculoskeletal:         General: No tenderness.      Right lower leg: Normal. No edema.      Left lower leg: Normal. No edema.   Skin:     General: Skin is warm and dry.   Neurological:      Mental Status: She is alert. Mental status is at baseline.           Laboratory Studies:  NT-proBNP: No results for input(s): \"NTBNP\" in the last 72 hours.   Chemistries: No results for input(s): \"NA\", \"K\", \"CL\", \"CO2\", \"BUN\" in the last 72 hours.    Invalid input(s): \"CREA\", \"GLU\"  Lipid Profile:   Lab Results   Component Value Date    CHOL 149 08/01/2015    CHOL 142 04/25/2015    CHOL 188 09/13/2014     Lab Results   Component Value Date    HDL 41 (L) 12/29/2023    HDL 45 (L) 07/13/2023    HDL 48 (L) 06/06/2023     Lab Results   Component Value Date    LDLCALC 63 12/29/2023    LDLCALC 71 07/13/2023    LDLCALC 84 06/06/2023     Lab Results   Component Value Date    TRIG 129 12/29/2023    TRIG 88 07/13/2023    TRIG 81 06/06/2023     Lab Results   Component Value Date    CHOL 149 08/01/2015    LDLDIRECT 77 06/06/2023    HDL 41 (L) 12/29/2023    HDL 59 08/01/2015    TRIG 129 12/29/2023    TRIG 73 08/01/2015     No results for input(s): \"CHOL\", \"LDLDIRECT\", \"HDL\", \"TRIG\" in the last 72 hours.    Cardiac testing:     EKG reviewed personally:    Results for orders placed or performed in visit on 03/13/24   POCT ECG    Impression    Normal sinus rhythm, normal EKG " "        Echocardiogram  5/23-personally reviewed-EF normal, no regional wall motion abnormality, normal diastolic dysfunction, no significant valve disease.    Stress Test  5/23-abnormal stress echo with anterior apical ischemia    Cardiac Cath  6/5/23-95% mid LAD status post drug-eluting stent    Luis Fisher MD    Portions of the record may have been created with voice recognition software.  Occasional wrong word or \"sound a like\" substitutions may have occurred due to the inherent limitations of voice recognition software.  Read the chart carefully and recognize, using context, where substitutions have occurred.  "

## 2024-03-13 NOTE — ASSESSMENT & PLAN NOTE
Lab Results   Component Value Date    HGBA1C 8.2 (H) 12/06/2023   Did not tolerate Jardiance, it was discontinued  Currently on metformin/insulin

## 2024-03-13 NOTE — ASSESSMENT & PLAN NOTE
Previously on lisinopril/HCTZ  This was discontinued over the summer in favor of Jardiance for diabetic control  Blood pressures are currently poorly controlled  Jardiance has since been discontinued  We resumed lisinopril/HCTZ 10/12.5 mg, follow-up BMP last week was normal and blood pressure is back to 128/72.

## 2024-03-16 DIAGNOSIS — E11.65 TYPE 2 DIABETES MELLITUS WITH HYPERGLYCEMIA, WITHOUT LONG-TERM CURRENT USE OF INSULIN (HCC): ICD-10-CM

## 2024-03-18 RX ORDER — LISINOPRIL AND HYDROCHLOROTHIAZIDE 12.5; 1 MG/1; MG/1
1 TABLET ORAL DAILY
Qty: 90 TABLET | Refills: 6 | Status: SHIPPED | OUTPATIENT
Start: 2024-03-18

## 2024-03-23 DIAGNOSIS — R73.9 HYPERGLYCEMIA: ICD-10-CM

## 2024-03-25 RX ORDER — INSULIN DEGLUDEC 100 U/ML
INJECTION, SOLUTION SUBCUTANEOUS
Qty: 15 ML | Refills: 1 | Status: SHIPPED | OUTPATIENT
Start: 2024-03-25

## 2024-04-12 ENCOUNTER — TELEPHONE (OUTPATIENT)
Dept: HEMATOLOGY ONCOLOGY | Facility: CLINIC | Age: 64
End: 2024-04-12

## 2024-04-12 NOTE — TELEPHONE ENCOUNTER
Appointment Change  Cancel, Reschedule, Change to Virtual      Who are you speaking with? Patient   If it is not the patient, is the caller listed on the communication consent form? Yes   Which provider is the appointment scheduled with? Dr Grace Murray   When was the original appointment scheduled?    Please list date and time 5/16/24   At which location is the appointment scheduled to take place? [facility]   Was the appointment rescheduled?     Was the appointment changed from an in person visit to a virtual visit?    If so, please list the details of the change. 5/14/24   What is the reason for the appointment change? Provide/left message

## 2024-04-24 ENCOUNTER — TELEPHONE (OUTPATIENT)
Dept: FAMILY MEDICINE CLINIC | Facility: MEDICAL CENTER | Age: 64
End: 2024-04-24

## 2024-04-24 NOTE — TELEPHONE ENCOUNTER
Spoke with pt, she is having her dm eye exam done through her endocrinology physician and will have results sent to us.

## 2024-05-03 DIAGNOSIS — I42.9 CARDIOMYOPATHY (HCC): ICD-10-CM

## 2024-05-03 RX ORDER — METOPROLOL SUCCINATE 25 MG/1
25 TABLET, EXTENDED RELEASE ORAL DAILY
Qty: 90 TABLET | Refills: 3 | Status: SHIPPED | OUTPATIENT
Start: 2024-05-03

## 2024-05-15 DIAGNOSIS — R73.9 HYPERGLYCEMIA: ICD-10-CM

## 2024-05-16 RX ORDER — INSULIN DEGLUDEC 100 U/ML
INJECTION, SOLUTION SUBCUTANEOUS
Qty: 15 ML | Refills: 1 | Status: SHIPPED | OUTPATIENT
Start: 2024-05-16

## 2024-05-25 DIAGNOSIS — E78.00 PURE HYPERCHOLESTEROLEMIA: ICD-10-CM

## 2024-05-25 RX ORDER — ATORVASTATIN CALCIUM 40 MG/1
40 TABLET, FILM COATED ORAL DAILY
Qty: 90 TABLET | Refills: 1 | Status: SHIPPED | OUTPATIENT
Start: 2024-05-25

## 2024-06-20 DIAGNOSIS — E11.65 TYPE 2 DIABETES MELLITUS WITH HYPERGLYCEMIA, WITHOUT LONG-TERM CURRENT USE OF INSULIN (HCC): ICD-10-CM

## 2024-06-27 ENCOUNTER — TELEPHONE (OUTPATIENT)
Age: 64
End: 2024-06-27

## 2024-06-27 DIAGNOSIS — Z95.5 STATUS POST INSERTION OF DRUG ELUTING CORONARY ARTERY STENT: ICD-10-CM

## 2024-06-27 RX ORDER — CLOPIDOGREL BISULFATE 75 MG/1
75 TABLET ORAL DAILY
Qty: 30 TABLET | Refills: 0 | Status: SHIPPED | OUTPATIENT
Start: 2024-06-27 | End: 2024-07-01 | Stop reason: SDUPTHER

## 2024-06-27 NOTE — TELEPHONE ENCOUNTER
Patient was calling in regards to the clopidogrel (PLAVIX) 75 mg tablet    She was put on it was leaving the hospital last year. She thought she was only supposed to be on it for a year or so. She wanted to double check with the doctor as to if she should continue the medication.     She did send in a medication refill request to continue taking it til she hears back from the doctor.     Can this please be reviewed with the provider?    Patient would like a call back to let her know if she should continue the medication    Amanda  [phone]

## 2024-07-01 ENCOUNTER — TELEPHONE (OUTPATIENT)
Age: 64
End: 2024-07-01

## 2024-07-01 DIAGNOSIS — Z95.5 STATUS POST INSERTION OF DRUG ELUTING CORONARY ARTERY STENT: ICD-10-CM

## 2024-07-01 NOTE — TELEPHONE ENCOUNTER
"Clearly spelled out in my note from March 2024.  \"I will see her back in 6 months, no further cardiovascular testing necessary at this time, and we will discuss eventual discontinuation of clopidogrel, but not until at least the end of 2024.   "

## 2024-07-01 NOTE — TELEPHONE ENCOUNTER
Insurance requires 90 day script. One refill as last office note indicates remaining on until at least end of 2024.

## 2024-07-01 NOTE — TELEPHONE ENCOUNTER
Pt called wanting to know if she needs to continue taking Plavix. 30day supply was sent to pharmacy however insurance usually does not cover a 30 day supply per previous notes.    Per refill note on 6/27 it states:   Zulma Kumar   to Luis Fisher MD   [facility]    6/27/24  5:25 PM  Patient was prescribed courtesy refill of 30 days due to needing updated CMP, but insurance only allows 90 day fills at a time.        Please advise if pt should continue plavix and if so does pt need a CMP?     Pt states she will call back if insurance does not cover 30day supply.

## 2024-07-02 NOTE — TELEPHONE ENCOUNTER
Amanda called back, and still awaiting for her refill on Plavix, she is out of it as of today, please sent in 90 days supply at her request to pharmacy.

## 2024-07-03 RX ORDER — CLOPIDOGREL BISULFATE 75 MG/1
75 TABLET ORAL DAILY
Qty: 90 TABLET | Refills: 1 | Status: SHIPPED | OUTPATIENT
Start: 2024-07-03

## 2024-07-03 NOTE — TELEPHONE ENCOUNTER
Pt called again stating that she still has not received her 90 day supply of Plavix. She states that she is out of her medication completely now.     She states she needs the refill to be sent today.    Escalated to access supervisor.     Please advise

## 2024-07-11 RX ORDER — CLOPIDOGREL BISULFATE 75 MG/1
75 TABLET ORAL DAILY
Qty: 90 TABLET | Refills: 0 | Status: SHIPPED | OUTPATIENT
Start: 2024-07-11 | End: 2024-07-12 | Stop reason: SDUPTHER

## 2024-07-12 RX ORDER — CLOPIDOGREL BISULFATE 75 MG/1
75 TABLET ORAL DAILY
Qty: 90 TABLET | Refills: 0 | Status: SHIPPED | OUTPATIENT
Start: 2024-07-12

## 2024-08-05 ENCOUNTER — RA CDI HCC (OUTPATIENT)
Dept: OTHER | Facility: HOSPITAL | Age: 64
End: 2024-08-05

## 2024-08-05 NOTE — PROGRESS NOTES
HCC coding opportunities          Chart Reviewed number of suggestions sent to Provider: 1   E11,29 R80.9    Patients Insurance        Commercial Insurance: [employer]

## 2024-08-09 ENCOUNTER — TELEPHONE (OUTPATIENT)
Age: 64
End: 2024-08-09

## 2024-08-09 ENCOUNTER — OFFICE VISIT (OUTPATIENT)
Dept: FAMILY MEDICINE CLINIC | Facility: MEDICAL CENTER | Age: 64
End: 2024-08-09
Payer: COMMERCIAL

## 2024-08-09 VITALS
HEART RATE: 88 BPM | SYSTOLIC BLOOD PRESSURE: 124 MMHG | DIASTOLIC BLOOD PRESSURE: 78 MMHG | TEMPERATURE: 99.2 F | RESPIRATION RATE: 16 BRPM | HEIGHT: 68 IN | BODY MASS INDEX: 26.83 KG/M2 | OXYGEN SATURATION: 99 % | WEIGHT: 177 LBS

## 2024-08-09 DIAGNOSIS — E11.65 TYPE 2 DIABETES MELLITUS WITH HYPERGLYCEMIA, WITHOUT LONG-TERM CURRENT USE OF INSULIN (HCC): ICD-10-CM

## 2024-08-09 DIAGNOSIS — Z00.00 ANNUAL PHYSICAL EXAM: Primary | ICD-10-CM

## 2024-08-09 DIAGNOSIS — Z12.11 COLON CANCER SCREENING: ICD-10-CM

## 2024-08-09 LAB — SL AMB POCT HEMOGLOBIN AIC: 8.3 (ref ?–6.5)

## 2024-08-09 PROCEDURE — 99397 PER PM REEVAL EST PAT 65+ YR: CPT | Performed by: STUDENT IN AN ORGANIZED HEALTH CARE EDUCATION/TRAINING PROGRAM

## 2024-08-09 PROCEDURE — 83036 HEMOGLOBIN GLYCOSYLATED A1C: CPT | Performed by: STUDENT IN AN ORGANIZED HEALTH CARE EDUCATION/TRAINING PROGRAM

## 2024-08-09 PROCEDURE — 99396 PREV VISIT EST AGE 40-64: CPT | Performed by: STUDENT IN AN ORGANIZED HEALTH CARE EDUCATION/TRAINING PROGRAM

## 2024-08-09 NOTE — PROGRESS NOTES
[facility] HEALTH MAINTENANCE OFFICE VISIT  [facility] - [facility] [place]    NAME: Amanda Benavidez  AGE: 63 y.o. SEX: female  : 1960     DATE: 2024    Assessment and Plan     1. Annual physical exam  2. Type 2 diabetes mellitus with hyperglycemia, without long-term current use of insulin (HCC)  -     POCT hemoglobin A1c 8.3  -     Albumin / creatinine urine ratio  - Managed by Endocrinology  3. Colon cancer screening  -     Cologuard      Patient Counseling:   Nutrition: Stressed importance of a well balanced diet, moderation of sodium/saturated fat, caloric balance and sufficient intake of fiber  Exercise: Stressed the importance of regular exercise with a goal of 150 minutes per week  Dental Health: Discussed daily flossing and brushing and regular dental visits   Counseled about sunscreen use and sun protection  Immunizations reviewed:   Informed about update with COVID-19 vaccine  Patient believes she completed Tdap about 3 years ago at pharmacy, will obtain record  Patient plans on receiving influenza vaccine 2024  Counseled about new RSV vaccine  Counseled about Shingrix vaccine series, she is still considering  Counseled about PCV 20 vaccine, defers  Discussed benefits of:    Established with gynecology for well woman care  2022 Pap smear and cotesting normal cytology and negative HPV  2/15/24  IMPRESSION:No mammographic evidence of malignancy.  Reminded to schedule DXA scan  Would like to proceed with Cologuard testing for colon cancer screening  BMI Counseling: Body mass index is 26.91 kg/m². Discussed with patient's BMI with her.     Depression Screening and Follow-up Plan: Patient was screened for depression during today's encounter. They screened negative with a PHQ-2 score of 0.          Follow-up in 6 months and sooner as needed.  Chief Complaint     Chief Complaint   Patient presents with    Physical Exam       History of Present Illness  "    HPI    Well Adult Physical   Patient here for a comprehensive physical exam.      Diet and Physical Activity  Diet: \"some days better than others, egg, fruit, tuna fish, yogurt, crackers, cheese,  cooks dinners\"  Exercise: 2x/week    Depression Screen  PHQ-2/9 Depression Screening    Little interest or pleasure in doing things: 0 - not at all  Feeling down, depressed, or hopeless: 0 - not at all  PHQ-2 Score: 0  PHQ-2 Interpretation: Negative depression screen          General Health  Hearing: Normal:  bilateral  Vision: goes for regular eye exams, going to make optometry appt  Dental: regular dental visits, brushes teeth twice daily, and flosses teeth occasionally    Reproductive Health  Follows with gynecologist      The following portions of the patient's history were reviewed and updated as appropriate: allergies, current medications, past family history, past medical history, past social history, past surgical history and problem list.    Review of Systems     Review of Systems    As noted in HPI     Past Medical History     Past Medical History:   Diagnosis Date    Diabetes mellitus (HCC) 2023    Hypertension     Obesity     Had weight trell surgery       Past Surgical History     Past Surgical History:   Procedure Laterality Date    BARIATRIC SURGERY      CARDIAC CATHETERIZATION N/A 2023    Procedure: Cardiac Coronary Angiogram;  Surgeon: Syed Baltazar DO;  [facility];  Service: Cardiology    CARDIAC CATHETERIZATION N/A 2023    Procedure: Cardiac pci;  Surgeon: Syed Baltazar DO;  [facility];  Service: Cardiology     SECTION      32 years ago (2019)       Social History     Social History     Socioeconomic History    Marital status: /Civil Union     Spouse name: None    Number of children: None    Years of education: None    Highest education level: None   Occupational History    None   Tobacco Use    Smoking status: " Former     Current packs/day: 0.00     Average packs/day: 1 pack/day for 20.0 years (20.0 ttl pk-yrs)     Types: Cigarettes     Start date:      Quit date:      Years since quittin.6    Smokeless tobacco: Never    Tobacco comments:     20 years ago.   Vaping Use    Vaping status: Never Used   Substance and Sexual Activity    Alcohol use: Never    Drug use: Never    Sexual activity: Not Currently     Partners: Male     Birth control/protection: Post-menopausal   Other Topics Concern    None   Social History Narrative    None     Social Determinants of Health     Financial Resource Strain: Not on file   Food Insecurity: Not on file   Transportation Needs: Not on file   Physical Activity: Not on file   Stress: Not on file (2021)   Social Connections: Not on file   Intimate Partner Violence: Low Risk  (2021)    [facility]    Intimate Partner Violence     Insults You: Not on file     Threatens You: Not on file     Screams at You: Not on file     Physically Hurt: Not on file     Intimate Partner Violence Score: Not on file   Housing Stability: Not on file       Family History     Family History   Problem Relation Age of Onset    Breast cancer Mother 50    Arrhythmia Mother         atrial flutter    Skin cancer Mother     Hypertension Father     Hyperlipidemia Father     Breast cancer Sister 40    Atrial fibrillation Sister     No Known Problems Sister     No Known Problems Daughter     Breast cancer Maternal Grandmother 50    Throat cancer Maternal Grandfather     No Known Problems Paternal Grandmother     No Known Problems Paternal Grandfather     Atrial fibrillation Brother     Other Brother         sarosis of liver    No Known Problems Brother     No Known Problems Brother     No Known Problems Brother     No Known Problems Brother     No Known Problems Brother     No Known Problems Brother     Heart attack Paternal Uncle     Breast cancer Maternal Aunt 50    No Known Problems  Maternal Aunt     No Known Problems Paternal Aunt     No Known Problems Paternal Aunt     No Known Problems Paternal Aunt     No Known Problems Paternal Aunt     Stroke Neg Hx     Anuerysm Neg Hx     Clotting disorder Neg Hx     Heart failure Neg Hx     Coronary artery disease Neg Hx        Current Medications       Current Outpatient Medications:     aspirin 81 mg chewable tablet, CHEW AND SWALLOW ONE TABLET BY MOUTH ONCE DAILY, Disp: 90 tablet, Rfl: 0    atorvastatin (LIPITOR) 40 mg tablet, TAKE 1 TABLET BY MOUTH EVERY DAY, Disp: 90 tablet, Rfl: 1    calcium citrate (CALCITRATE) 950 MG tablet, Take 1 tablet by mouth daily, Disp: , Rfl:     cholecalciferol (VITAMIN D3) 1,000 units tablet, Take 1,000 Units by mouth daily, Disp: , Rfl:     clopidogrel (PLAVIX) 75 mg tablet, Take 1 tablet (75 mg total) by mouth daily, Disp: 90 tablet, Rfl: 0    cyanocobalamin 100 MCG tablet, Take 100 mcg by mouth daily, Disp: , Rfl:     glucose blood (FREESTYLE LITE) test strip, Use as instructed, Disp: 300 strip, Rfl: 3    insulin degludec (Tresiba FlexTouch) 100 units/mL injection pen, INJECT 10 UNITS UNDER THE SKIN DAILY, Disp: 15 mL, Rfl: 1    Insulin Pen Needle (BD Pen Needle Trudy 2nd Gen) 32G X 4 MM MISC, For use with insulin pen. Pharmacy may dispense brand covered by insurance., Disp: 300 each, Rfl: 3    Lancets (freestyle) lancets, Use as instructed, Disp: 300 each, Rfl: 3    lisinopril-hydrochlorothiazide (PRINZIDE,ZESTORETIC) 10-12.5 MG per tablet, Take 1 tablet by mouth daily, Disp: 90 tablet, Rfl: 6    metFORMIN (GLUCOPHAGE) 500 mg tablet, TAKE 1 TABLET BY MOUTH TWICE A DAY, Disp: 180 tablet, Rfl: 1    metoprolol succinate (TOPROL-XL) 25 mg 24 hr tablet, Take 1 tablet (25 mg total) by mouth daily, Disp: 90 tablet, Rfl: 3    multivitamin (THERAGRAN) TABS, Take 1 tablet by mouth daily, Disp: , Rfl:      Allergies     Allergies   Allergen Reactions    Nsaids Other (See Comments)     Weight loss surgery advise to not take   "      Objective     /78 (BP Location: Left arm, Patient Position: Sitting, Cuff Size: Standard)   Pulse 88   Temp 99.2 °F (37.3 °C) (Temporal)   Resp 16   Ht 5' 8\" (1.727 m)   Wt 80.3 kg (177 lb)   SpO2 99%   BMI 26.91 kg/m²      Physical Exam  Vitals reviewed.   Constitutional:       General: She is not in acute distress.     Appearance: Normal appearance.   HENT:      Head: Normocephalic and atraumatic.      Right Ear: External ear normal.      Left Ear: External ear normal.      Nose: Nose normal.      Mouth/Throat:      Mouth: Mucous membranes are moist.      Pharynx: Oropharynx is clear.   Eyes:      Extraocular Movements: Extraocular movements intact.      Conjunctiva/sclera: Conjunctivae normal.      Pupils: Pupils are equal, round, and reactive to light.   Cardiovascular:      Rate and Rhythm: Normal rate and regular rhythm.      Pulses: Normal pulses.      Heart sounds: Normal heart sounds.   Pulmonary:      Effort: Pulmonary effort is normal.      Breath sounds: Normal breath sounds.   Abdominal:      General: Abdomen is flat. Bowel sounds are normal.      Palpations: Abdomen is soft.      Tenderness: There is no abdominal tenderness.   Musculoskeletal:      Cervical back: Neck supple.      Right lower leg: No edema.      Left lower leg: No edema.   Lymphadenopathy:      Cervical: No cervical adenopathy.   Skin:     General: Skin is warm and dry.      Capillary Refill: Capillary refill takes less than 2 seconds.   Neurological:      Mental Status: She is alert and oriented to person, place, and time.   Psychiatric:         Mood and Affect: Mood normal.         Behavior: Behavior normal.         Thought Content: Thought content normal.           No results found.        Laure Thomas DO  [facility]  "

## 2024-08-09 NOTE — TELEPHONE ENCOUNTER
Sara from [facility] ([phone]) called bc they are working on patient's care gaps and patient said she had an exam and foot exam at the endocrinology office. I looked and did not see the documentation. Patient was last seen in December.   Can you follow up with patient or their chart on the foot and eye exams to complete the care gaps?   Thank you

## 2024-08-09 NOTE — PATIENT INSTRUCTIONS
"Patient Education     Routine physical for adults   The Basics   Written by the doctors and editors at [facility]   What is a physical? -- A physical is a routine visit, or \"check-up,\" with your doctor. You might also hear it called a \"wellness visit\" or \"preventive visit.\"  During each visit, the doctor will:   Ask about your physical and mental health   Ask about your habits, behaviors, and lifestyle   Do an exam   Give you vaccines if needed   Talk to you about any medicines you take   Give advice about your health   Answer your questions  Getting regular check-ups is an important part of taking care of your health. It can help your doctor find and treat any problems you have. But it's also important for preventing health problems.  A routine physical is different from a \"sick visit.\" A sick visit is when you see a doctor because of a health concern or problem. Since physicals are scheduled ahead of time, you can think about what you want to ask the doctor.  How often should I get a physical? -- It depends on your age and health. In general, for people age 21 years and older:   If you are younger than 50 years, you might be able to get a physical every 3 years.   If you are 50 years or older, your doctor might recommend a physical every year.  If you have an ongoing health condition, like diabetes or high blood pressure, your doctor will probably want to see you more often.  What happens during a physical? -- In general, each visit will include:   Physical exam - The doctor or nurse will check your height, weight, heart rate, and blood pressure. They will also look at your eyes and ears. They will ask about how you are feeling and whether you have any symptoms that bother you.   Medicines - It's a good idea to bring a list of all the medicines you take to each doctor visit. Your doctor will talk to you about your medicines and answer any questions. Tell them if you are having any side effects that bother you. You " "should also tell them if you are having trouble paying for any of your medicines.   Habits and behaviors - This includes:   Your diet   Your exercise habits   Whether you smoke, drink alcohol, or use drugs   Whether you are sexually active   Whether you feel safe at home  Your doctor will talk to you about things you can do to improve your health and lower your risk of health problems. They will also offer help and support. For example, if you want to quit smoking, they can give you advice and might prescribe medicines. If you want to improve your diet or get more physical activity, they can help you with this, too.   Lab tests, if needed - The tests you get will depend on your age and situation. For example, your doctor might want to check your:   Cholesterol   Blood sugar   Iron level   Vaccines - The recommended vaccines will depend on your age, health, and what vaccines you already had. Vaccines are very important because they can prevent certain serious or deadly infections.   Discussion of screening - \"Screening\" means checking for diseases or other health problems before they cause symptoms. Your doctor can recommend screening based on your age, risk, and preferences. This might include tests to check for:   Cancer, such as breast, prostate, cervical, ovarian, colorectal, prostate, lung, or skin cancer   Sexually transmitted infections, such as chlamydia and gonorrhea   Mental health conditions like depression and anxiety  Your doctor will talk to you about the different types of screening tests. They can help you decide which screenings to have. They can also explain what the results might mean.   Answering questions - The physical is a good time to ask the doctor or nurse questions about your health. If needed, they can refer you to other doctors or specialists, too.  Adults older than 65 years often need other care, too. As you get older, your doctor will talk to you about:   How to prevent falling at " home   Hearing or vision tests   Memory testing   How to take your medicines safely   Making sure that you have the help and support you need at home  All topics are updated as new evidence becomes available and our peer review process is complete.  This topic retrieved from [employer] on: May 02, 2024.  Topic 184490 Version 1.0  Release: 32.4.3 - C32.122  © 2024 UpToDate, Inc. and/or its affiliates. All rights reserved.  Consumer Information Use and Disclaimer   Disclaimer: This generalized information is a limited summary of diagnosis, treatment, and/or medication information. It is not meant to be comprehensive and should be used as a tool to help the user understand and/or assess potential diagnostic and treatment options. It does NOT include all information about conditions, treatments, medications, side effects, or risks that may apply to a specific patient. It is not intended to be medical advice or a substitute for the medical advice, diagnosis, or treatment of a health care provider based on the health care provider's examination and assessment of a patient's specific and unique circumstances. Patients must speak with a health care provider for complete information about their health, medical questions, and treatment options, including any risks or benefits regarding use of medications. This information does not endorse any treatments or medications as safe, effective, or approved for treating a specific patient. UpToDate, Inc. and its affiliates disclaim any warranty or liability relating to this information or the use thereof.The use of this information is governed by the Terms of Use, available at https://www.woltersPageFairuwer.com/en/know/clinical-effectiveness-terms. 2024© UpToDate, Inc. and its affiliates and/or licensors. All rights reserved.  Copyright   © 2024 UpToDate, Inc. and/or its affiliates. All rights reserved.

## 2024-08-13 NOTE — TELEPHONE ENCOUNTER
Patient's last documented foot exam in December was April 2023 so she would not have been due until April 2024 so it was not done at that time

## 2024-09-10 ENCOUNTER — TELEPHONE (OUTPATIENT)
Age: 64
End: 2024-09-10

## 2024-09-10 NOTE — TELEPHONE ENCOUNTER
Confirmed refills are available for  with pharmacy and also notified pt that she has refills available for  - pt verbalized understanding.

## 2024-09-10 NOTE — TELEPHONE ENCOUNTER
Patient called to request a refill for their     lisinopril-hydrochlorothiazide (PRINZIDE,ZESTORETIC) 10-12.5 MG per tablet    advised a refill was requested on 3/18/2024 for  90 and 6 refills .

## 2024-09-26 LAB — COLOGUARD RESULT REPORTABLE: NEGATIVE

## 2024-09-30 LAB — COLOGUARD RESULT REPORTABLE: NEGATIVE

## 2024-10-22 ENCOUNTER — OFFICE VISIT (OUTPATIENT)
Dept: URGENT CARE | Facility: MEDICAL CENTER | Age: 64
End: 2024-10-22
Payer: COMMERCIAL

## 2024-10-22 VITALS
HEART RATE: 67 BPM | DIASTOLIC BLOOD PRESSURE: 57 MMHG | SYSTOLIC BLOOD PRESSURE: 125 MMHG | TEMPERATURE: 98.5 F | OXYGEN SATURATION: 95 % | RESPIRATION RATE: 18 BRPM

## 2024-10-22 DIAGNOSIS — H65.03 BILATERAL ACUTE SEROUS OTITIS MEDIA, RECURRENCE NOT SPECIFIED: Primary | ICD-10-CM

## 2024-10-22 PROBLEM — L40.9 PSORIASIS: Status: ACTIVE | Noted: 2017-06-07

## 2024-10-22 PROBLEM — L60.3 DYSTROPHIC NAIL: Status: ACTIVE | Noted: 2017-06-07

## 2024-10-22 PROBLEM — R20.0 NUMBNESS OF TOES: Status: ACTIVE | Noted: 2017-06-07

## 2024-10-22 PROCEDURE — G0382 LEV 3 HOSP TYPE B ED VISIT: HCPCS

## 2024-10-22 RX ORDER — PREDNISONE 10 MG/1
10 TABLET ORAL 2 TIMES DAILY WITH MEALS
Qty: 10 TABLET | Refills: 0 | Status: SHIPPED | OUTPATIENT
Start: 2024-10-22 | End: 2024-10-27

## 2024-10-23 NOTE — PROGRESS NOTES
"  [facility]        NAME: Amanda Benavidez is a 64 y.o. female  : 1960    MRN: [id number]  DATE: 2024  TIME: 8:11 PM    Assessment and Plan   Bilateral acute serous otitis media, recurrence not specified [H65.03]  1. Bilateral acute serous otitis media, recurrence not specified  predniSONE 10 mg tablet          Patient Instructions   Amanda has fluid behind both ear drums, but this is not causing infection.    No antibiotics are required to get rid of the fluid.   Eustachian tube dysfunction is inflammation of the tubes that drain the space behind the ear drum. When this swelling occurs, fluid can become trapped behind the ear drum. Fluid typically resolves on its own over a 4-6 week timeframe.     Will opt to treat with low dose steroids, please take as directed.    Follow up with PCP in 3-5 days.  Proceed to ER if symptoms worsen.    If tests are performed, our office will contact you with results only if changes need to made to the care plan discussed with you at the visit. You can review your full results on [facility].    Chief Complaint     Chief Complaint   Patient presents with    Ear Fullness     Verbalizes waking up and feeling ear \"fullness\"  and has been having intermittent dizziness when changing from a sitting to standing position. Denies dizziness at this time. Staets she did use a nasal spray which helped.          History of Present Illness     Ear Fullness   There is pain in both ears. This is a new problem. The current episode started today. The problem occurs constantly. The problem has been waxing and waning. There has been no fever. Pertinent negatives include no abdominal pain, coughing, diarrhea, headaches, rash, sore throat or vomiting. Treatments tried: Nasal spray. The treatment provided mild relief.       Review of Systems   Review of Systems   Constitutional:  Negative for chills, diaphoresis, fatigue and fever.   HENT:  Positive for congestion and " postnasal drip. Negative for drooling, ear pain, sinus pressure, sinus pain and sore throat.         BL ear fullness   Respiratory: Negative.  Negative for cough, shortness of breath and wheezing.    Cardiovascular: Negative.  Negative for chest pain and palpitations.   Gastrointestinal:  Negative for abdominal pain, constipation, diarrhea, nausea and vomiting.   Musculoskeletal:  Negative for myalgias.   Skin:  Negative for color change, rash and wound.   Neurological:  Positive for dizziness (Intermittent, denies any current dizziness). Negative for headaches.     Current Medications       Current Outpatient Medications:     aspirin 81 mg chewable tablet, CHEW AND SWALLOW ONE TABLET BY MOUTH ONCE DAILY, Disp: 90 tablet, Rfl: 0    atorvastatin (LIPITOR) 40 mg tablet, TAKE 1 TABLET BY MOUTH EVERY DAY, Disp: 90 tablet, Rfl: 1    calcium citrate (CALCITRATE) 950 MG tablet, Take 1 tablet by mouth daily, Disp: , Rfl:     cholecalciferol (VITAMIN D3) 1,000 units tablet, Take 1,000 Units by mouth daily, Disp: , Rfl:     clopidogrel (PLAVIX) 75 mg tablet, Take 1 tablet (75 mg total) by mouth daily, Disp: 90 tablet, Rfl: 0    cyanocobalamin 100 MCG tablet, Take 100 mcg by mouth daily, Disp: , Rfl:     insulin degludec (Tresiba FlexTouch) 100 units/mL injection pen, INJECT 10 UNITS UNDER THE SKIN DAILY, Disp: 15 mL, Rfl: 1    lisinopril-hydrochlorothiazide (PRINZIDE,ZESTORETIC) 10-12.5 MG per tablet, Take 1 tablet by mouth daily, Disp: 90 tablet, Rfl: 6    metFORMIN (GLUCOPHAGE) 500 mg tablet, TAKE 1 TABLET BY MOUTH TWICE A DAY, Disp: 180 tablet, Rfl: 1    metoprolol succinate (TOPROL-XL) 25 mg 24 hr tablet, Take 1 tablet (25 mg total) by mouth daily, Disp: 90 tablet, Rfl: 3    multivitamin (THERAGRAN) TABS, Take 1 tablet by mouth daily, Disp: , Rfl:     predniSONE 10 mg tablet, Take 1 tablet (10 mg total) by mouth 2 (two) times a day with meals for 5 days, Disp: 10 tablet, Rfl: 0    glucose blood (FREESTYLE LITE) test  strip, Use as instructed, Disp: 300 strip, Rfl: 3    Insulin Pen Needle (BD Pen Needle Trudy 2nd Gen) 32G X 4 MM MISC, For use with insulin pen. Pharmacy may dispense brand covered by insurance., Disp: 300 each, Rfl: 3    Lancets (freestyle) lancets, Use as instructed, Disp: 300 each, Rfl: 3    Current Allergies     Allergies as of 10/22/2024 - Reviewed 10/22/2024   Allergen Reaction Noted    Nsaids Other (See Comments) 2023            The following portions of the patient's history were reviewed and updated as appropriate: allergies, current medications, past family history, past medical history, past social history, past surgical history and problem list.     Past Medical History:   Diagnosis Date    Diabetes mellitus (HCC) 2023    Hypertension     Obesity     Had weight trell surgery       Past Surgical History:   Procedure Laterality Date    BARIATRIC SURGERY      CARDIAC CATHETERIZATION N/A 2023    Procedure: Cardiac Coronary Angiogram;  Surgeon: Syed Baltazar DO;  [facility];  Service: Cardiology    CARDIAC CATHETERIZATION N/A 2023    Procedure: Cardiac pci;  Surgeon: Syed Baltazar DO;  [facility];  Service: Cardiology     SECTION      32 years ago (2019)       Family History   Problem Relation Age of Onset    Breast cancer Mother 50    Arrhythmia Mother         atrial flutter    Skin cancer Mother     Hypertension Father     Hyperlipidemia Father     Breast cancer Sister 40    Atrial fibrillation Sister     No Known Problems Sister     No Known Problems Daughter     Breast cancer Maternal Grandmother 50    Throat cancer Maternal Grandfather     No Known Problems Paternal Grandmother     No Known Problems Paternal Grandfather     Atrial fibrillation Brother     Other Brother         sarosis of liver    No Known Problems Brother     No Known Problems Brother     No Known Problems Brother     No Known Problems Brother     No Known  Problems Brother     No Known Problems Brother     Heart attack Paternal Uncle     Breast cancer Maternal Aunt 50    No Known Problems Maternal Aunt     No Known Problems Paternal Aunt     No Known Problems Paternal Aunt     No Known Problems Paternal Aunt     No Known Problems Paternal Aunt     Stroke Neg Hx     Anuerysm Neg Hx     Clotting disorder Neg Hx     Heart failure Neg Hx     Coronary artery disease Neg Hx          Medications have been verified.        Objective   /57   Pulse 67   Temp 98.5 °F (36.9 °C)   Resp 18   SpO2 95%        Physical Exam     Physical Exam  Vitals and nursing note reviewed.   Constitutional:       General: She is not in acute distress.     Appearance: Normal appearance. She is not ill-appearing, toxic-appearing or diaphoretic.   HENT:      Head: Normocephalic.      Right Ear: Ear canal and external ear normal. There is no impacted cerumen.      Left Ear: Ear canal and external ear normal. There is no impacted cerumen.      Nose: Nose normal. No congestion or rhinorrhea.      Mouth/Throat:      Mouth: Mucous membranes are moist.      Pharynx: No posterior oropharyngeal erythema.   Cardiovascular:      Rate and Rhythm: Normal rate and regular rhythm.      Pulses: Normal pulses.      Heart sounds: Normal heart sounds. No murmur heard.  Pulmonary:      Effort: Pulmonary effort is normal. No respiratory distress.      Breath sounds: Normal breath sounds. No stridor. No wheezing, rhonchi or rales.   Chest:      Chest wall: No tenderness.   Musculoskeletal:         General: Normal range of motion.   Lymphadenopathy:      Cervical: No cervical adenopathy.   Skin:     General: Skin is warm.   Neurological:      Mental Status: She is alert.

## 2024-10-25 ENCOUNTER — HOSPITAL ENCOUNTER (INPATIENT)
Facility: HOSPITAL | Age: 64
LOS: 2 days | Discharge: HOME | DRG: 378 | End: 2024-10-27
Attending: EMERGENCY MEDICINE | Admitting: INTERNAL MEDICINE
Payer: COMMERCIAL

## 2024-10-25 ENCOUNTER — APPOINTMENT (EMERGENCY)
Dept: RADIOLOGY | Facility: HOSPITAL | Age: 64
DRG: 378 | End: 2024-10-25
Payer: COMMERCIAL

## 2024-10-25 DIAGNOSIS — D64.9 ANEMIA: ICD-10-CM

## 2024-10-25 DIAGNOSIS — K92.2 GI BLEED: Primary | ICD-10-CM

## 2024-10-25 PROBLEM — D62 ACUTE BLOOD LOSS ANEMIA: Status: ACTIVE | Noted: 2024-10-25

## 2024-10-25 PROBLEM — R20.0 NUMBNESS OF TOES: Status: RESOLVED | Noted: 2017-06-07 | Resolved: 2024-10-25

## 2024-10-25 PROBLEM — E87.1 HYPONATREMIA: Status: RESOLVED | Noted: 2023-04-15 | Resolved: 2024-10-25

## 2024-10-25 LAB
ABO GROUP BLD: NORMAL
ABO GROUP BLD: NORMAL
ALBUMIN SERPL BCG-MCNC: 3.4 G/DL (ref 3.5–5)
ALP SERPL-CCNC: 52 U/L (ref 34–104)
ALT SERPL W P-5'-P-CCNC: 7 U/L (ref 7–52)
ANION GAP SERPL CALCULATED.3IONS-SCNC: 8 MMOL/L (ref 4–13)
AST SERPL W P-5'-P-CCNC: 8 U/L (ref 13–39)
ATRIAL RATE: 83 BPM
BACTERIA UR QL AUTO: ABNORMAL /HPF
BASOPHILS # BLD AUTO: 0.02 THOUSANDS/ΜL (ref 0–0.1)
BASOPHILS NFR BLD AUTO: 0 % (ref 0–1)
BILIRUB SERPL-MCNC: 0.21 MG/DL (ref 0.2–1)
BILIRUB UR QL STRIP: NEGATIVE
BLD GP AB SCN SERPL QL: NEGATIVE
BUN SERPL-MCNC: 64 MG/DL (ref 5–25)
CALCIUM ALBUM COR SERPL-MCNC: 9.5 MG/DL (ref 8.3–10.1)
CALCIUM SERPL-MCNC: 9 MG/DL (ref 8.4–10.2)
CARDIAC TROPONIN I PNL SERPL HS: <2 NG/L
CHLORIDE SERPL-SCNC: 102 MMOL/L (ref 96–108)
CLARITY UR: CLEAR
CO2 SERPL-SCNC: 24 MMOL/L (ref 21–32)
COLOR UR: COLORLESS
CREAT SERPL-MCNC: 1.09 MG/DL (ref 0.6–1.3)
EOSINOPHIL # BLD AUTO: 0.03 THOUSAND/ΜL (ref 0–0.61)
EOSINOPHIL NFR BLD AUTO: 0 % (ref 0–6)
ERYTHROCYTE [DISTWIDTH] IN BLOOD BY AUTOMATED COUNT: 14.7 % (ref 11.6–15.1)
GFR SERPL CREATININE-BSD FRML MDRD: 53 ML/MIN/1.73SQ M
GLUCOSE SERPL-MCNC: 176 MG/DL (ref 65–140)
GLUCOSE SERPL-MCNC: 180 MG/DL (ref 65–140)
GLUCOSE SERPL-MCNC: 219 MG/DL (ref 65–140)
GLUCOSE SERPL-MCNC: 278 MG/DL (ref 65–140)
GLUCOSE UR STRIP-MCNC: NEGATIVE MG/DL
HCT VFR BLD AUTO: 13.8 % (ref 34.8–46.1)
HCT VFR BLD AUTO: 22.6 % (ref 34.8–46.1)
HGB BLD-MCNC: 4.2 G/DL (ref 11.5–15.4)
HGB BLD-MCNC: 7.2 G/DL (ref 11.5–15.4)
HGB UR QL STRIP.AUTO: NEGATIVE
IMM GRANULOCYTES # BLD AUTO: 0.06 THOUSAND/UL (ref 0–0.2)
IMM GRANULOCYTES NFR BLD AUTO: 1 % (ref 0–2)
KETONES UR STRIP-MCNC: NEGATIVE MG/DL
LEUKOCYTE ESTERASE UR QL STRIP: ABNORMAL
LYMPHOCYTES # BLD AUTO: 2.01 THOUSANDS/ΜL (ref 0.6–4.47)
LYMPHOCYTES NFR BLD AUTO: 22 % (ref 14–44)
MCH RBC QN AUTO: 27.5 PG (ref 26.8–34.3)
MCHC RBC AUTO-ENTMCNC: 30.4 G/DL (ref 31.4–37.4)
MCV RBC AUTO: 90 FL (ref 82–98)
MONOCYTES # BLD AUTO: 0.58 THOUSAND/ΜL (ref 0.17–1.22)
MONOCYTES NFR BLD AUTO: 6 % (ref 4–12)
NEUTROPHILS # BLD AUTO: 6.34 THOUSANDS/ΜL (ref 1.85–7.62)
NEUTS SEG NFR BLD AUTO: 71 % (ref 43–75)
NITRITE UR QL STRIP: NEGATIVE
NON-SQ EPI CELLS URNS QL MICRO: ABNORMAL /HPF
NRBC BLD AUTO-RTO: 0 /100 WBCS
P AXIS: 12 DEGREES
PH UR STRIP.AUTO: 5 [PH]
PLATELET # BLD AUTO: 413 THOUSANDS/UL (ref 149–390)
PMV BLD AUTO: 9.2 FL (ref 8.9–12.7)
POTASSIUM SERPL-SCNC: 4.3 MMOL/L (ref 3.5–5.3)
PR INTERVAL: 140 MS
PROT SERPL-MCNC: 6.3 G/DL (ref 6.4–8.4)
PROT UR STRIP-MCNC: NEGATIVE MG/DL
QRS AXIS: 26 DEGREES
QRSD INTERVAL: 102 MS
QT INTERVAL: 378 MS
QTC INTERVAL: 444 MS
RBC # BLD AUTO: 1.53 MILLION/UL (ref 3.81–5.12)
RBC #/AREA URNS AUTO: ABNORMAL /HPF
RH BLD: POSITIVE
RH BLD: POSITIVE
SODIUM SERPL-SCNC: 134 MMOL/L (ref 135–147)
SP GR UR STRIP.AUTO: 1.02 (ref 1–1.03)
SPECIMEN EXPIRATION DATE: NORMAL
T WAVE AXIS: 3 DEGREES
UROBILINOGEN UR STRIP-ACNC: <2 MG/DL
VENTRICULAR RATE: 83 BPM
WBC # BLD AUTO: 9.04 THOUSAND/UL (ref 4.31–10.16)
WBC #/AREA URNS AUTO: ABNORMAL /HPF

## 2024-10-25 PROCEDURE — 93005 ELECTROCARDIOGRAM TRACING: CPT

## 2024-10-25 PROCEDURE — 96365 THER/PROPH/DIAG IV INF INIT: CPT

## 2024-10-25 PROCEDURE — 86901 BLOOD TYPING SEROLOGIC RH(D): CPT | Performed by: EMERGENCY MEDICINE

## 2024-10-25 PROCEDURE — 99285 EMERGENCY DEPT VISIT HI MDM: CPT

## 2024-10-25 PROCEDURE — 80053 COMPREHEN METABOLIC PANEL: CPT | Performed by: EMERGENCY MEDICINE

## 2024-10-25 PROCEDURE — 81001 URINALYSIS AUTO W/SCOPE: CPT | Performed by: EMERGENCY MEDICINE

## 2024-10-25 PROCEDURE — 86850 RBC ANTIBODY SCREEN: CPT | Performed by: EMERGENCY MEDICINE

## 2024-10-25 PROCEDURE — 71046 X-RAY EXAM CHEST 2 VIEWS: CPT

## 2024-10-25 PROCEDURE — 85018 HEMOGLOBIN: CPT

## 2024-10-25 PROCEDURE — 85014 HEMATOCRIT: CPT

## 2024-10-25 PROCEDURE — 99291 CRITICAL CARE FIRST HOUR: CPT | Performed by: EMERGENCY MEDICINE

## 2024-10-25 PROCEDURE — 84484 ASSAY OF TROPONIN QUANT: CPT | Performed by: EMERGENCY MEDICINE

## 2024-10-25 PROCEDURE — 99255 IP/OBS CONSLTJ NEW/EST HI 80: CPT | Performed by: INTERNAL MEDICINE

## 2024-10-25 PROCEDURE — 36415 COLL VENOUS BLD VENIPUNCTURE: CPT | Performed by: EMERGENCY MEDICINE

## 2024-10-25 PROCEDURE — 30233N1 TRANSFUSION OF NONAUTOLOGOUS RED BLOOD CELLS INTO PERIPHERAL VEIN, PERCUTANEOUS APPROACH: ICD-10-PCS | Performed by: INTERNAL MEDICINE

## 2024-10-25 PROCEDURE — 82948 REAGENT STRIP/BLOOD GLUCOSE: CPT

## 2024-10-25 PROCEDURE — 84484 ASSAY OF TROPONIN QUANT: CPT

## 2024-10-25 PROCEDURE — 36430 TRANSFUSION BLD/BLD COMPNT: CPT

## 2024-10-25 PROCEDURE — 86923 COMPATIBILITY TEST ELECTRIC: CPT

## 2024-10-25 PROCEDURE — 93010 ELECTROCARDIOGRAM REPORT: CPT | Performed by: INTERNAL MEDICINE

## 2024-10-25 PROCEDURE — 86900 BLOOD TYPING SEROLOGIC ABO: CPT | Performed by: EMERGENCY MEDICINE

## 2024-10-25 PROCEDURE — P9016 RBC LEUKOCYTES REDUCED: HCPCS

## 2024-10-25 PROCEDURE — 85025 COMPLETE CBC W/AUTO DIFF WBC: CPT | Performed by: EMERGENCY MEDICINE

## 2024-10-25 PROCEDURE — 99223 1ST HOSP IP/OBS HIGH 75: CPT | Performed by: INTERNAL MEDICINE

## 2024-10-25 RX ORDER — FLUTICASONE PROPIONATE 50 MCG
1 SPRAY, SUSPENSION (ML) NASAL DAILY
Status: DISCONTINUED | OUTPATIENT
Start: 2024-10-25 | End: 2024-10-27 | Stop reason: HOSPADM

## 2024-10-25 RX ORDER — INSULIN LISPRO 100 [IU]/ML
1-6 INJECTION, SOLUTION INTRAVENOUS; SUBCUTANEOUS
Status: DISCONTINUED | OUTPATIENT
Start: 2024-10-25 | End: 2024-10-27 | Stop reason: HOSPADM

## 2024-10-25 RX ORDER — ATORVASTATIN CALCIUM 40 MG/1
40 TABLET, FILM COATED ORAL EVERY EVENING
Status: DISCONTINUED | OUTPATIENT
Start: 2024-10-25 | End: 2024-10-27 | Stop reason: HOSPADM

## 2024-10-25 RX ORDER — LORATADINE 10 MG/1
10 TABLET ORAL ONCE
Status: COMPLETED | OUTPATIENT
Start: 2024-10-25 | End: 2024-10-25

## 2024-10-25 RX ADMIN — ATORVASTATIN CALCIUM 40 MG: 40 TABLET, FILM COATED ORAL at 17:20

## 2024-10-25 RX ADMIN — SODIUM CHLORIDE 80 MG: 9 INJECTION, SOLUTION INTRAVENOUS at 10:01

## 2024-10-25 RX ADMIN — SODIUM CHLORIDE 8 MG/HR: 9 INJECTION, SOLUTION INTRAVENOUS at 14:38

## 2024-10-25 RX ADMIN — SODIUM CHLORIDE 8 MG/HR: 9 INJECTION, SOLUTION INTRAVENOUS at 21:49

## 2024-10-25 RX ADMIN — FLUTICASONE PROPIONATE 1 SPRAY: 50 SPRAY, METERED NASAL at 08:42

## 2024-10-25 RX ADMIN — LORATADINE 10 MG: 10 TABLET ORAL at 08:42

## 2024-10-25 NOTE — ASSESSMENT & PLAN NOTE
"Lab Results   Component Value Date    HGBA1C 8.3 (A) 08/09/2024       No results for input(s): \"POCGLU\" in the last 72 hours.    Home regimen: Tresiba 10 units daily.  Metformin 500 mg BID  Insulin sliding scale while inpatient.  Glucose checks  Target glucose 140-180    "

## 2024-10-25 NOTE — ED PROVIDER NOTES
Time reflects when diagnosis was documented in both MDM as applicable and the Disposition within this note       Time User Action Codes Description Comment    10/25/2024  9:43 AM Syed Guaman Add [K92.2] GI bleed     10/25/2024  9:43 AM Syed Guaman Add [D64.9] Anemia           ED Disposition       ED Disposition   Admit    Condition   Stable    Date/Time   Fri Oct 25, 2024 10:29 AM    Comment   Case was discussed with [facility] and the patient's admission status was agreed to be Admission Status: inpatient status to the service of Dr. Mendiola .               Assessment & Plan       Medical Decision Making  Patient is a 64-year-old female, with a history significant for postmenopausal status, diabetes, hypertension per my review the medical record, who presents to the ED today for evaluation of a 3-day history of intermittent dizziness characterized as lightheadedness/disequilibrium.  This sensation completely resolves in between episodes and there are no clear exacerbating or provoking factors.  Patient denies a sensation of vertigo.  Patient reports associated left ear pain, atraumatic.  She was evaluated for this at an urgent care 3 days ago and prescribed steroids for serous otitis media.  There is no associated fever, headache, dysphagia, vision change, chest pain, dyspnea, abdominal pain, urinary symptoms, weakness, numbness.  Patient's , present in room and finding collateral history, states patient is not confused.  Patient is concerned about her blood sugar given her history of diabetes and the steroid use.  Patient is currently afebrile and hemodynamically stable.  Her physical exam is notable for no focal neurodeficit, clear heart and lungs, soft nontender abdomen, serous otitus media on the left without signs of mastoiditis.  This presentation is concerning for: Serous otitis media, electrolyte abnormality, ACS, ANA, peripheral vertigo, hyperglycemia, medication side effect.  No reason  to suspect stroke based upon history physical exam.  Patient at risk for pneumonia/UTI/viral syndrome.  Will investigate with cardiac workup, UA.  Will manage with Flonase, loratadine, further based on workup.  Patient advised to discontinue the systemic steroids.    Amount and/or Complexity of Data Reviewed  Labs: ordered. Decision-making details documented in ED Course.  Radiology: ordered.    Risk  OTC drugs.  Decision regarding hospitalization.        ED Course as of 10/25/24 1520   Fri Oct 25, 2024   0858 Epithelial Cells: Occasional   0859 WBC, UA(!): 2-4   0859 Bacteria, UA: Occasional  Likely contaminant, will await culture   0859 ECG per my independent interpretation: Normal rate, regular rhythm, no ectopy, normal axis, no ST elevations or depressions     0923 Hemoglobin(!!): 4.2  Low   0923 Hemoglobin(!!): 4.2  Low   0924 hs TnI 0hr: <2  WNL   0937 Results reviewed with patient.  Blood consent signed.  Questions answered to patient's satisfaction.  Stool grossly melanotic and guaiac positive.  Patient denies regular alcohol use/history of varices       Medications   fluticasone (FLONASE) 50 mcg/act nasal spray 1 spray (1 spray Each Nare Given 10/25/24 0842)   atorvastatin (LIPITOR) tablet 40 mg (has no administration in time range)   insulin lispro (HumALOG/ADMELOG) 100 units/mL subcutaneous injection 1-6 Units ( Subcutaneous Not Given 10/25/24 1513)   pantoprazole (PROTONIX) 80 mg in sodium chloride 0.9 % 100 mL infusion (8 mg/hr Intravenous New Bag 10/25/24 1438)   loratadine (CLARITIN) tablet 10 mg (10 mg Oral Given 10/25/24 0842)   pantoprazole (PROTONIX) 80 mg in sodium chloride 0.9 % 100 mL IVPB (0 mg Intravenous Stopped 10/25/24 1144)       ED Risk Strat Scores                                               History of Present Illness       Chief Complaint   Patient presents with    Medical Problem     Pt reports went to  for ear problems and dx with fluid in ears earlier in week, given steroids,  states dizziness has gotten worse along with thumping in ears       Past Medical History:   Diagnosis Date    Diabetes mellitus (HCC) 2023    Hypertension     Obesity     Had weight trell surgery      Past Surgical History:   Procedure Laterality Date    BARIATRIC SURGERY      CARDIAC CATHETERIZATION N/A 2023    Procedure: Cardiac Coronary Angiogram;  Surgeon: Syed Baltazar DO;  Location: [facility];  Service: Cardiology    CARDIAC CATHETERIZATION N/A 2023    Procedure: Cardiac pci;  Surgeon: Syed Baltazar DO;  Location: [facility];  Service: Cardiology     SECTION      32 years ago (2019)      Family History   Problem Relation Age of Onset    Breast cancer Mother 50    Arrhythmia Mother         atrial flutter    Skin cancer Mother     Hypertension Father     Hyperlipidemia Father     Breast cancer Sister 40    Atrial fibrillation Sister     No Known Problems Sister     No Known Problems Daughter     Breast cancer Maternal Grandmother 50    Throat cancer Maternal Grandfather     No Known Problems Paternal Grandmother     No Known Problems Paternal Grandfather     Atrial fibrillation Brother     Other Brother         sarosis of liver    No Known Problems Brother     No Known Problems Brother     No Known Problems Brother     No Known Problems Brother     No Known Problems Brother     No Known Problems Brother     Heart attack Paternal Uncle     Breast cancer Maternal Aunt 50    No Known Problems Maternal Aunt     No Known Problems Paternal Aunt     No Known Problems Paternal Aunt     No Known Problems Paternal Aunt     No Known Problems Paternal Aunt     Stroke Neg Hx     Anuerysm Neg Hx     Clotting disorder Neg Hx     Heart failure Neg Hx     Coronary artery disease Neg Hx       Social History     Tobacco Use    Smoking status: Former     Current packs/day: 0.00     Average packs/day: 1 pack/day for 20.0 years (20.0 ttl pk-yrs)     Types: Cigarettes     Start  date:      Quit date:      Years since quittin.8    Smokeless tobacco: Never    Tobacco comments:     20 years ago.   Vaping Use    Vaping status: Never Used   Substance Use Topics    Alcohol use: Never    Drug use: Never      E-Cigarette/Vaping    E-Cigarette Use Never User       E-Cigarette/Vaping Substances    Nicotine No     THC No     CBD No     Flavoring No     Other No     Unknown No       I have reviewed and agree with the history as documented.     Patient is a 64-year-old female, with a history significant for postmenopausal status, diabetes, hypertension per my review the medical record, who presents to the ED today for evaluation of a 3-day history of intermittent dizziness characterized as lightheadedness/disequilibrium.  This sensation completely resolves in between episodes and there are no clear exacerbating or provoking factors.  Patient denies a sensation of vertigo.  Patient reports associated left ear pain, atraumatic.  She was evaluated for this at an urgent care 3 days ago and prescribed steroids for serous otitis media.  There is no associated fever, headache, dysphagia, vision change, chest pain, dyspnea, abdominal pain, urinary symptoms, weakness, numbness.  Patient's , present in room and finding collateral history, states patient is not confused.  Patient is concerned about her blood sugar given her history of diabetes and the steroid use.  Patient is without other concerns at this time.        Review of Systems   Constitutional:  Positive for fatigue. Negative for fever.   HENT:  Positive for congestion and ear pain. Negative for trouble swallowing.    Eyes:  Negative for visual disturbance.   Respiratory:  Negative for shortness of breath.    Cardiovascular:  Negative for chest pain.   Gastrointestinal:  Negative for abdominal pain.   Endocrine: Negative for polyuria.   Genitourinary:  Negative for dysuria.   Musculoskeletal:  Negative for gait problem.   Skin:   Negative for rash.   Allergic/Immunologic: Positive for environmental allergies.   Neurological:  Positive for dizziness and light-headedness. Negative for weakness, numbness and headaches.   Hematological:  Negative for adenopathy.   Psychiatric/Behavioral:  Negative for confusion.    All other systems reviewed and are negative.          Objective       ED Triage Vitals [10/25/24 0752]   Temperature Pulse Blood Pressure Respirations SpO2 Patient Position - Orthostatic VS   (!) 97.2 °F (36.2 °C) 93 104/55 16 98 % Sitting      Temp Source Heart Rate Source BP Location FiO2 (%) Pain Score    Oral Monitor Right arm -- --      Vitals      Date and Time Temp Pulse SpO2 Resp BP Pain Score FACES Pain Rating User   10/25/24 1515 98.9 °F (37.2 °C) 79 -- 16 101/58 -- -- LO   10/25/24 1455 98.4 °F (36.9 °C) 83 100 % 16 100/61 -- -- LO   10/25/24 1415 -- -- 100 % -- -- -- -- LO   10/25/24 1400 -- 86 98 % 16 107/60 -- -- LO   10/25/24 1345 -- -- 100 % -- -- -- -- LO   10/25/24 1330 -- 91 100 % 16 99/57 -- -- LO   10/25/24 1315 -- 85 100 % 16 95/56 -- -- LO   10/25/24 1305 98.2 °F (36.8 °C) 88 96 % 16 95/56 -- -- LO   10/25/24 1245 98.3 °F (36.8 °C) 87 100 % 16 99/57 -- -- LO   10/25/24 1235 98.3 °F (36.8 °C) 87 100 % 16 100/56 -- -- LO   10/25/24 1220 98 °F (36.7 °C) 92 99 % 16 110/64 -- -- LO   10/25/24 1215 98.2 °F (36.8 °C) 88 100 % 16 108/57 patient hypotensive at baseline -- -- LO   10/25/24 1210 98.2 °F (36.8 °C) 92 99 % 16 103/56 -- -- LO   10/25/24 1205 98 °F (36.7 °C) 91 99 % 16 115/56 -- -- LO   10/25/24 1200 97.9 °F (36.6 °C) 92 -- 16 120/61 -- -- RJK   10/25/24 0752 97.2 °F (36.2 °C) 93 98 % 16 104/55 -- -- AB            Physical Exam  Vitals and nursing note reviewed.   Constitutional:       General: She is not in acute distress.     Appearance: She is not toxic-appearing.      Comments: Patient appears comfortable during my evaluation    HENT:      Head: Normocephalic and atraumatic.      Right Ear: Tympanic  membrane, ear canal and external ear normal. There is no impacted cerumen.      Left Ear: External ear normal. There is no impacted cerumen.      Ears:      Comments: Serous otitis media, left    No mastoid process erythema/bogginess/tenderness     Nose: Nose normal. No rhinorrhea.      Mouth/Throat:      Mouth: Mucous membranes are moist.      Pharynx: Oropharynx is clear. No oropharyngeal exudate or posterior oropharyngeal erythema.      Comments: Uvula midline. No oropharyngeal or submandibular mass/swelling  Eyes:      General: No scleral icterus.        Right eye: No discharge.         Left eye: No discharge.      Conjunctiva/sclera: Conjunctivae normal.      Pupils: Pupils are equal, round, and reactive to light.   Cardiovascular:      Rate and Rhythm: Normal rate and regular rhythm.      Pulses: Normal pulses.      Heart sounds: Normal heart sounds. No murmur heard.     No friction rub. No gallop.      Comments: 2+ Radial  Pulmonary:      Effort: Pulmonary effort is normal. No respiratory distress.      Breath sounds: Normal breath sounds. No stridor. No wheezing, rhonchi or rales.   Abdominal:      General: Abdomen is flat. There is no distension.      Palpations: Abdomen is soft.      Tenderness: There is no abdominal tenderness. There is no right CVA tenderness, left CVA tenderness, guarding or rebound.   Musculoskeletal:         General: No deformity.      Cervical back: Normal range of motion and neck supple. No rigidity or tenderness. No muscular tenderness.   Lymphadenopathy:      Cervical: No cervical adenopathy.   Skin:     General: Skin is warm and dry.      Capillary Refill: Capillary refill takes less than 2 seconds.   Neurological:      Mental Status: She is alert.      Comments: Cranial nerves 2-12 intact.  5/5 strength in all four extremities including finger extension against resistance.  Sensation to light touch subjectively intact/equal in all four extremities and the face.  Patient able to  ambulate without difficulty.    Patient is speaking clearly in complete sentences.  Patient is answering appropriately and able to follow commands.    Psychiatric:         Mood and Affect: Mood normal.         Behavior: Behavior normal.         Results Reviewed       Procedure Component Value Units Date/Time    HS Troponin I 4hr [[id number]] Collected: 10/25/24 1444    Lab Status: Final result Specimen: Blood from Line, Venous Updated: 10/25/24 1513     hs TnI 4hr <2 ng/L      Delta 4hr hsTnI --    HS Troponin I 2hr [[id number]] Collected: 10/25/24 1148    Lab Status: Final result Specimen: Blood from Line, Venous Updated: 10/25/24 1237     hs TnI 2hr <2 ng/L      Delta 2hr hsTnI --    Hemoglobin and hematocrit, blood [[id number]]     Lab Status: No result Specimen: Blood     Fingerstick Glucose (POCT) [[id number]]  (Abnormal) Collected: 10/25/24 1203    Lab Status: Final result Specimen: Blood Updated: 10/25/24 1204     POC Glucose 219 mg/dl     CBC and differential [[id number]]  (Abnormal) Collected: 10/25/24 0848    Lab Status: Final result Specimen: Blood from Arm, Left Updated: 10/25/24 0923     WBC 9.04 Thousand/uL      RBC 1.53 Million/uL      Hemoglobin 4.2 g/dL      Hematocrit 13.8 %      MCV 90 fL      MCH 27.5 pg      MCHC 30.4 g/dL      RDW 14.7 %      MPV 9.2 fL      Platelets 413 Thousands/uL      nRBC 0 /100 WBCs      Segmented % 71 %      Immature Grans % 1 %      Lymphocytes % 22 %      Monocytes % 6 %      Eosinophils Relative 0 %      Basophils Relative 0 %      Absolute Neutrophils 6.34 Thousands/µL      Absolute Immature Grans 0.06 Thousand/uL      Absolute Lymphocytes 2.01 Thousands/µL      Absolute Monocytes 0.58 Thousand/µL      Eosinophils Absolute 0.03 Thousand/µL      Basophils Absolute 0.02 Thousands/µL     Narrative:      This is an appended report.  These results have been appended to a previously verified report.    HS Troponin 0hr (reflex protocol) [[id number]]  (Normal) Collected: 10/25/24  0848    Lab Status: Final result Specimen: Blood from Arm, Left Updated: 10/25/24 0917     hs TnI 0hr <2 ng/L     Comprehensive metabolic panel [[id number]]  (Abnormal) Collected: 10/25/24 0848    Lab Status: Final result Specimen: Blood from Arm, Left Updated: 10/25/24 0909     Sodium 134 mmol/L      Potassium 4.3 mmol/L      Chloride 102 mmol/L      CO2 24 mmol/L      ANION GAP 8 mmol/L      BUN 64 mg/dL      Creatinine 1.09 mg/dL      Glucose 278 mg/dL      Calcium 9.0 mg/dL      Corrected Calcium 9.5 mg/dL      AST 8 U/L      ALT 7 U/L      Alkaline Phosphatase 52 U/L      Total Protein 6.3 g/dL      Albumin 3.4 g/dL      Total Bilirubin 0.21 mg/dL      eGFR 53 ml/min/1.73sq m     Narrative:      [employer] guidelines for Chronic Kidney Disease (CKD):     Stage 1 with normal or high GFR (GFR > 90 mL/min/1.73 square meters)    Stage 2 Mild CKD (GFR = 60-89 mL/min/1.73 square meters)    Stage 3A Moderate CKD (GFR = 45-59 mL/min/1.73 square meters)    Stage 3B Moderate CKD (GFR = 30-44 mL/min/1.73 square meters)    Stage 4 Severe CKD (GFR = 15-29 mL/min/1.73 square meters)    Stage 5 End Stage CKD (GFR <15 mL/min/1.73 square meters)  Note: GFR calculation is accurate only with a steady state creatinine    Urine Microscopic [[id number]]  (Abnormal) Collected: 10/25/24 0841    Lab Status: Final result Specimen: Urine, Clean Catch Updated: 10/25/24 0858     RBC, UA None Seen /hpf      WBC, UA 2-4 /hpf      Epithelial Cells Occasional /hpf      Bacteria, UA Occasional /hpf     UA w Reflex to Microscopic w Reflex to Culture [[id number]]  (Abnormal) Collected: 10/25/24 0841    Lab Status: Final result Specimen: Urine, Clean Catch Updated: 10/25/24 0856     Color, UA Colorless     Clarity, UA Clear     Specific Gravity, UA 1.019     pH, UA 5.0     Leukocytes, UA Large     Nitrite, UA Negative     Protein, UA Negative mg/dl      Glucose, UA Negative mg/dl      Ketones, UA Negative mg/dl       Urobilinogen, UA <2.0 mg/dl      Bilirubin, UA Negative     Occult Blood, UA Negative            XR chest 2 views   ED Interpretation by Syed Guaman MD (10/25 0859)   Per my independent interpretation: No acute cardiopulmonary process      Final Interpretation by Zafar Grimm MD (10/25 0855)      No acute cardiopulmonary disease.            Workstation performed: [id number]             CriticalCare Time    Date/Time: 10/25/2024 3:19 PM    Performed by: Syed Guaman MD  Authorized by: Syed Guaman MD    Critical care provider statement:     Critical care time (minutes):  30    Critical care start time:  10/25/2024 9:00 AM    Critical care end time:  10/25/2024 9:30 AM    Critical care time was exclusive of:  Separately billable procedures and treating other patients and teaching time    Critical care was necessary to treat or prevent imminent or life-threatening deterioration of the following conditions:  Circulatory failure and shock    Critical care was time spent personally by me on the following activities:  Obtaining history from patient or surrogate, development of treatment plan with patient or surrogate, evaluation of patient's response to treatment, examination of patient, ordering and performing treatments and interventions, ordering and review of laboratory studies, ordering and review of radiographic studies, re-evaluation of patient's condition and review of old charts    I assumed direction of critical care for this patient from another provider in my specialty: no        ED Medication and Procedure Management   Prior to Admission Medications   Prescriptions Last Dose Informant Patient Reported? Taking?   Insulin Pen Needle (BD Pen Needle Trudy 2nd Gen) 32G X 4 MM MISC Unknown Self No No   Sig: For use with insulin pen. Pharmacy may dispense brand covered by insurance.   Lancets (freestyle) lancets Unknown Self No No   Sig: Use as instructed   aspirin 81 mg chewable tablet  10/24/2024 Self No Yes   Sig: CHEW AND SWALLOW ONE TABLET BY MOUTH ONCE DAILY   atorvastatin (LIPITOR) 40 mg tablet 10/24/2024  No Yes   Sig: TAKE 1 TABLET BY MOUTH EVERY DAY   calcium citrate (CALCITRATE) 950 MG tablet Not Taking Self Yes No   Sig: Take 1 tablet by mouth daily   Patient not taking: Reported on 10/25/2024   cholecalciferol (VITAMIN D3) 1,000 units tablet Not Taking Self Yes No   Sig: Take 1,000 Units by mouth daily   Patient not taking: Reported on 10/25/2024   clopidogrel (PLAVIX) 75 mg tablet 10/24/2024  No Yes   Sig: Take 1 tablet (75 mg total) by mouth daily   cyanocobalamin 100 MCG tablet Not Taking Self Yes No   Sig: Take 100 mcg by mouth daily   Patient not taking: Reported on 10/25/2024   glucose blood (FREESTYLE LITE) test strip Unknown Self No No   Sig: Use as instructed   insulin degludec (Tresiba FlexTouch) 100 units/mL injection pen 10/24/2024  No Yes   Sig: INJECT 10 UNITS UNDER THE SKIN DAILY   lisinopril-hydrochlorothiazide (PRINZIDE,ZESTORETIC) 10-12.5 MG per tablet 10/24/2024  No Yes   Sig: Take 1 tablet by mouth daily   metFORMIN (GLUCOPHAGE) 500 mg tablet 10/24/2024  No Yes   Sig: TAKE 1 TABLET BY MOUTH TWICE A DAY   metoprolol succinate (TOPROL-XL) 25 mg 24 hr tablet 10/24/2024  No Yes   Sig: Take 1 tablet (25 mg total) by mouth daily   multivitamin (THERAGRAN) TABS Not Taking Self Yes No   Sig: Take 1 tablet by mouth daily   Patient not taking: Reported on 10/25/2024   predniSONE 10 mg tablet 10/24/2024  No Yes   Sig: Take 1 tablet (10 mg total) by mouth 2 (two) times a day with meals for 5 days      Facility-Administered Medications: None     Current Discharge Medication List        CONTINUE these medications which have NOT CHANGED    Details   aspirin 81 mg chewable tablet CHEW AND SWALLOW ONE TABLET BY MOUTH ONCE DAILY  Qty: 90 tablet, Refills: 0    Associated Diagnoses: Positive cardiac stress test      atorvastatin (LIPITOR) 40 mg tablet TAKE 1 TABLET BY MOUTH EVERY  DAY  Qty: 90 tablet, Refills: 1    Associated Diagnoses: Pure hypercholesterolemia      clopidogrel (PLAVIX) 75 mg tablet Take 1 tablet (75 mg total) by mouth daily  Qty: 90 tablet, Refills: 0    Associated Diagnoses: Status post insertion of drug eluting coronary artery stent      insulin degludec (Tresiba FlexTouch) 100 units/mL injection pen INJECT 10 UNITS UNDER THE SKIN DAILY  Qty: 15 mL, Refills: 1    Associated Diagnoses: Hyperglycemia      lisinopril-hydrochlorothiazide (PRINZIDE,ZESTORETIC) 10-12.5 MG per tablet Take 1 tablet by mouth daily  Qty: 90 tablet, Refills: 6    Associated Diagnoses: Benign essential hypertension      metFORMIN (GLUCOPHAGE) 500 mg tablet TAKE 1 TABLET BY MOUTH TWICE A DAY  Qty: 180 tablet, Refills: 1    Associated Diagnoses: Type 2 diabetes mellitus with hyperglycemia, without long-term current use of insulin (HCC)      metoprolol succinate (TOPROL-XL) 25 mg 24 hr tablet Take 1 tablet (25 mg total) by mouth daily  Qty: 90 tablet, Refills: 3    Associated Diagnoses: Cardiomyopathy (HCC)      predniSONE 10 mg tablet Take 1 tablet (10 mg total) by mouth 2 (two) times a day with meals for 5 days  Qty: 10 tablet, Refills: 0    Associated Diagnoses: Bilateral acute serous otitis media, recurrence not specified      calcium citrate (CALCITRATE) 950 MG tablet Take 1 tablet by mouth daily      cholecalciferol (VITAMIN D3) 1,000 units tablet Take 1,000 Units by mouth daily      cyanocobalamin 100 MCG tablet Take 100 mcg by mouth daily      glucose blood (FREESTYLE LITE) test strip Use as instructed  Qty: 300 strip, Refills: 3    Associated Diagnoses: Hyperglycemia; Type 2 diabetes mellitus with hyperglycemia, without long-term current use of insulin (HCC)      Insulin Pen Needle (BD Pen Needle Trudy 2nd Gen) 32G X 4 MM MISC For use with insulin pen. Pharmacy may dispense brand covered by insurance.  Qty: 300 each, Refills: 3    Associated Diagnoses: Hyperglycemia      Lancets (freestyle)  lancets Use as instructed  Qty: 300 each, Refills: 3    Associated Diagnoses: Hyperglycemia; Type 2 diabetes mellitus with hyperglycemia, without long-term current use of insulin (HCC)      multivitamin (THERAGRAN) TABS Take 1 tablet by mouth daily           No discharge procedures on file.  ED SEPSIS DOCUMENTATION   Time reflects when diagnosis was documented in both MDM as applicable and the Disposition within this note       Time User Action Codes Description Comment    10/25/2024  9:43 AM Syed Guaman [K92.2] GI bleed     10/25/2024  9:43 AM Syed Guaman [D64.9] Anemia                  Syed Guaman MD  10/25/24 1030       Syed Guaman MD  10/25/24 1520

## 2024-10-25 NOTE — ASSESSMENT & PLAN NOTE
"Lab Results   Component Value Date    HGBA1C 8.3 (A) 08/09/2024       No results for input(s): \"POCGLU\" in the last 72 hours.    Blood Sugar Average: Last 72 hrs:      "

## 2024-10-25 NOTE — ASSESSMENT & PLAN NOTE
Assessment:  Presented with lightheadedness and fatigue for the past 3 days.  Patient was seen at a urgent care on Tuesday where they diagnosed her with ear fullness and prescribed her prednisone for 5 days however she did not notice any improvement and presented to [facility] ED.  Reports melena that started on Tuesday.   No NSAIDs use.  Hemoglobin 4.2  Elevated BUN 64  Baseline hemoglobin (9-11). Hgb was normal prior to 4/2023  Hemodynamically stable    Hemoglobin   Date Value Ref Range Status   10/25/2024 4.2 (LL) 11.5 - 15.4 g/dL Final     Comment:     Results verified by repeat   08/01/2015 11.9 11.5 - 15.4 g/dL Final     Last colonoscopy in 2013 was normal with no polyps.  Normal Cologuard on 9/26  In the ED, received Protonix 80 mg IV.    Plan:  PRBCs transfusion  GI consult appreciated for concern of upper GI bleed  H&H every 8 hours  Monitor vital signs closely  Hold off DVT prophylaxis  AM BMP

## 2024-10-25 NOTE — H&P
"H&P - Hospitalist   Name: Amanda Benavidez 64 y.o. female I MRN: [id number]  Unit/Bed#: ED-39 I Date of Admission: 10/25/2024   Date of Service: 10/25/2024 I Hospital Day: 0     Assessment & Plan  Acute blood loss anemia  Assessment:  Presented with lightheadedness and fatigue for the past 3 days.  Patient was seen at a urgent care on Tuesday where they diagnosed her with ear fullness and prescribed her prednisone for 5 days however she did not notice any improvement and presented to [facility] ED.  Reports melena that started on Tuesday.   No NSAIDs use.  Hemoglobin 4.2  Elevated BUN 64  Baseline hemoglobin (9-11). Hgb was normal prior to 4/2023  Hemodynamically stable    Hemoglobin   Date Value Ref Range Status   10/25/2024 4.2 (LL) 11.5 - 15.4 g/dL Final     Comment:     Results verified by repeat   08/01/2015 11.9 11.5 - 15.4 g/dL Final     Last colonoscopy in 2013 was normal with no polyps.  Normal Cologuard on 9/26  In the ED, received Protonix 80 mg IV.    Plan:  PRBCs transfusion  GI consult appreciated for concern of upper GI bleed  H&H every 8 hours  Monitor vital signs closely  Hold off DVT prophylaxis  AM BMP    Coronary artery disease involving native coronary artery  History of CAD 6/2023 s/p drug-eluting stent LAD   Per Dr. Fisher in 7/2024, patient should be on Plavix and aspirin till the end of 2024  However, given anemia we will hold off Plavix and aspirin.  Consider discussion with cardiology after resuscitation and GI commendations regarding continuing Plavix.   Benign essential hypertension  BP Readings from Last 1 Encounters:   10/25/24 104/55     Soft blood pressure  Home regimen: Lisinopril-HCTZ, metoprolol 25 mg daily  Monitor off home medications.  Type 2 diabetes mellitus with hyperglycemia, without long-term current use of insulin (HCC)  Lab Results   Component Value Date    HGBA1C 8.3 (A) 08/09/2024       No results for input(s): \"POCGLU\" in the last 72 hours.    Home regimen: " Tresiba 10 units daily.  Metformin 500 mg BID  Insulin sliding scale while inpatient.  Glucose checks  Target glucose 140-180    Hyperlipidemia  Continue home atorvastatin 40 mg    History of Present Illness   Amanda Benavidez is a 64 y.o. female with PMHx of  diabetes type 2,CAD s/p LAD stent 6/2023, hypertension, Tramaine-en-Y bypass who presents with lightheadedness.  Patient reports lightheadedness for the past 3 days.  he was seen at urgent care on 10/22 where she was diagnosed with otitis media and prescribed prednisone for 5 days.  However, she did not notice any improvement and continued to feel lightheaded. She also experienced fatigue and generalized weakness. Reports that she had melena for the past 3 days.  No NSAIDs use. No prior smoking history. No alcohol. No extensive caffeine use.  Denies any chest pain, shortness of breath, or palpitations.  No hematuria.  No headache, numbness, or tingling.  No history of iron tablet use. Denies any weight loss, night sweats, appetite change, or chills. \She lives and ambulates independently.     Review of Systems   Constitutional:  Positive for fatigue. Negative for appetite change, chills, fever and unexpected weight change.   HENT:  Negative for ear pain and sore throat.    Eyes:  Negative for pain and visual disturbance.   Respiratory:  Negative for cough and shortness of breath.    Cardiovascular:  Negative for chest pain, palpitations and leg swelling.   Gastrointestinal:  Positive for blood in stool. Negative for abdominal pain, diarrhea, nausea and vomiting.   Genitourinary:  Negative for dysuria and hematuria.   Musculoskeletal:  Negative for arthralgias and back pain.   Skin:  Positive for pallor. Negative for color change and rash.   Neurological:  Positive for light-headedness. Negative for tremors, seizures, syncope and weakness.   All other systems reviewed and are negative.      Objective :  Temp:  [97.2 °F (36.2 °C)] 97.2 °F (36.2 °C)  HR:  [93] 93  BP:  "(104)/(55) 104/55  Resp:  [16] 16  SpO2:  [98 %] 98 %  O2 Device: None (Room air)  Orthostatic Blood Pressures      Flowsheet Row Most Recent Value   Blood Pressure 104/55 filed at 10/25/2024 0752   Patient Position - Orthostatic VS Sitting filed at 10/25/2024 0752          First Weight:  There were no vitals filed for this visit.    Physical Exam  Vitals and nursing note reviewed.   Constitutional:       General: She is not in acute distress.     Appearance: She is well-developed.   HENT:      Head: Normocephalic and atraumatic.   Eyes:      Conjunctiva/sclera: Conjunctivae normal.   Cardiovascular:      Rate and Rhythm: Regular rhythm. Tachycardia present.      Heart sounds: No murmur heard.  Pulmonary:      Effort: Pulmonary effort is normal. No respiratory distress.      Breath sounds: Normal breath sounds.   Abdominal:      Palpations: Abdomen is soft.      Tenderness: There is no abdominal tenderness.   Musculoskeletal:         General: No swelling.      Cervical back: Neck supple.      Right lower leg: Edema present.      Left lower leg: Edema present.      Comments: Trace edema bilaterally   Skin:     General: Skin is warm and dry.      Capillary Refill: Capillary refill takes less than 2 seconds.      Coloration: Skin is pale.   Neurological:      Mental Status: She is alert.   Psychiatric:         Mood and Affect: Mood normal.           Lab Results: I have reviewed the following results:  Results from last 7 days   Lab Units 10/25/24  0848   WBC Thousand/uL 9.04   HEMOGLOBIN g/dL 4.2*   HEMATOCRIT % 13.8*   PLATELETS Thousands/uL 413*     Results from last 7 days   Lab Units 10/25/24  0848   POTASSIUM mmol/L 4.3   CHLORIDE mmol/L 102   CO2 mmol/L 24   BUN mg/dL 64*   CREATININE mg/dL 1.09   CALCIUM mg/dL 9.0         Lab Results   Component Value Date    HGBA1C 8.3 (A) 08/09/2024     No results found for: \"CKTOTAL\", \"CKMB\", \"CKMBINDEX\", \"TROPONINI\"      VTE Prophylaxis: RX contraindicated due to: Anemia    "

## 2024-10-25 NOTE — ASSESSMENT & PLAN NOTE
With melena over the last few days, in the setting of Tramaine-en-Y gastric bypass anatomy, antiplatelet therapy (Plavix for coronary stent placed 6/2023), most likely represents upper GI bleed from marginal ulcer, rule out less likely cause such as Dieulafoy lesion or malignancy.  Appears hemodynamically stable at this time, hemoglobin is 4.2 however and currently at elevated risk for endoscopy    -As long as patient remains hemodynamically stable, would generally recommend to transfuse and resuscitate until hgb 7 or greater; agree with PRBC transfusions and close monitoring of hemoglobin    -Protonix drip, continue hold on Plavix    -Keep n.p.o. for now, if she remains hemodynamically stable with no signs of active GI hemorrhage and her hemoglobin appears appropriately increased after transfusions, can consider clear liquid diet for the evening, n.p.o. after midnight    -Will tentatively plan for EGD tomorrow, however can plan for EGD more urgently in the interim if she does develop evidence of hemodynamically unstable active bleeding    -Advised patient about continued avoidance of NSAIDs, she will probably benefit from routine PPI therapy moving forward

## 2024-10-25 NOTE — PLAN OF CARE
Problem: PAIN - ADULT  Goal: Verbalizes/displays adequate comfort level or baseline comfort level  Description: Interventions:  - Encourage patient to monitor pain and request assistance  - Assess pain using appropriate pain scale  - Administer analgesics based on type and severity of pain and evaluate response  - Implement non-pharmacological measures as appropriate and evaluate response  - Consider cultural and social influences on pain and pain management  - Notify physician/advanced practitioner if interventions unsuccessful or patient reports new pain  Outcome: Progressing     Problem: INFECTION - ADULT  Goal: Absence or prevention of progression during hospitalization  Description: INTERVENTIONS:  - Assess and monitor for signs and symptoms of infection  - Monitor lab/diagnostic results  - Monitor all insertion sites, i.e. indwelling lines, tubes, and drains  - Monitor endotracheal if appropriate and nasal secretions for changes in amount and color  - [place] appropriate cooling/warming therapies per order  - Administer medications as ordered  - Instruct and encourage patient and family to use good hand hygiene technique  - Identify and instruct in appropriate isolation precautions for identified infection/condition  Outcome: Progressing  Goal: Absence of fever/infection during neutropenic period  Description: INTERVENTIONS:  - Monitor WBC    Outcome: Progressing     Problem: SAFETY ADULT  Goal: Patient will remain free of falls  Description: INTERVENTIONS:  - Educate patient/family on patient safety including physical limitations  - Instruct patient to call for assistance with activity   - Consult OT/PT to assist with strengthening/mobility   - Keep Call bell within reach  - Keep bed low and locked with side rails adjusted as appropriate  - Keep care items and personal belongings within reach  - Initiate and maintain comfort rounds  - Make Fall Risk Sign visible to staff  - Obtain necessary fall risk  management equipment  - Apply yellow socks and bracelet for high fall risk patients  - Consider moving patient to room near nurses station  Outcome: Progressing  Goal: Maintain or return to baseline ADL function  Description: INTERVENTIONS:  -  Assess patient's ability to carry out ADLs; assess patient's baseline for ADL function and identify physical deficits which impact ability to perform ADLs (bathing, care of mouth/teeth, toileting, grooming, dressing, etc.)  - Assess/evaluate cause of self-care deficits   - Assess range of motion  - Assess patient's mobility; develop plan if impaired  - Assess patient's need for assistive devices and provide as appropriate  - Encourage maximum independence but intervene and supervise when necessary  - Involve family in performance of ADLs  - Assess for home care needs following discharge   - Consider OT consult to assist with ADL evaluation and planning for discharge  - Provide patient education as appropriate  Outcome: Progressing  Goal: Maintains/Returns to pre admission functional level  Description: INTERVENTIONS:  - Perform AM-PAC 6 Click Basic Mobility/ Daily Activity assessment daily.  - Set and communicate daily mobility goal to care team and patient/family/caregiver.   - Collaborate with rehabilitation services on mobility goals if consulted  - Out of bed for toileting  - Record patient progress and toleration of activity level   Outcome: Progressing     Problem: DISCHARGE PLANNING  Goal: Discharge to home or other facility with appropriate resources  Description: INTERVENTIONS:  - Identify barriers to discharge w/patient and caregiver  - Arrange for needed discharge resources and transportation as appropriate  - Identify discharge learning needs (meds, wound care, etc.)  - Arrange for interpretive services to assist at discharge as needed  - Refer to Case Management Department for coordinating discharge planning if the patient needs post-hospital services based on  physician/advanced practitioner order or complex needs related to functional status, cognitive ability, or social support system  Outcome: Progressing     Problem: Knowledge Deficit  Goal: Patient/family/caregiver demonstrates understanding of disease process, treatment plan, medications, and discharge instructions  Description: Complete learning assessment and assess knowledge base.  Interventions:  - Provide teaching at level of understanding  - Provide teaching via preferred learning methods  Outcome: Progressing

## 2024-10-25 NOTE — CONSULTS
Consultation - Gastroenterology   Name: Amanda Benavidez 64 y.o. female I MRN: [id number]  Unit/Bed#: ED-39 I Date of Admission: 10/25/2024   Date of Service: 10/25/2024 I Hospital Day: 0   Inpatient consult to gastroenterology  Consult performed by: Luther Chowdhury PA-C  Consult ordered by: Cee Romero MD        Physician Requesting Evaluation: Jamila Mendiola MD   Reason for Evaluation / Principal Problem: GI bleed    Assessment & Plan  Acute blood loss anemia  With melena over the last few days, in the setting of Tramaine-en-Y gastric bypass anatomy, antiplatelet therapy (Plavix for coronary stent placed 6/2023), most likely represents upper GI bleed from marginal ulcer, rule out less likely cause such as Dieulafoy lesion or malignancy.  Appears hemodynamically stable at this time, hemoglobin is 4.2 however and currently at elevated risk for endoscopy    -As long as patient remains hemodynamically stable, would generally recommend to transfuse and resuscitate until hgb 7 or greater; agree with PRBC transfusions and close monitoring of hemoglobin    -Protonix drip, continue hold on Plavix    -Keep n.p.o. for now, if she remains hemodynamically stable with no signs of active GI hemorrhage and her hemoglobin appears appropriately increased after transfusions, can consider clear liquid diet for the evening, n.p.o. after midnight    -Will tentatively plan for EGD tomorrow, however can plan for EGD more urgently in the interim if she does develop evidence of hemodynamically unstable active bleeding    -Advised patient about continued avoidance of NSAIDs, she will probably benefit from routine PPI therapy moving forward      History of Present Illness   HPI:  Amanda Benavidez is a 64 y.o. female with history of Tramaine-en-Y gastric bypass 13 to 14 years ago, coronary artery disease status post coronary stent placement 6/2023 on Plavix who presented to the emergency room, with concerns for lightheadedness, dizziness and  "dark-colored stools.  She said she saw urgent care on Tuesday (3 days ago) with symptoms of dizziness and lightheadedness, says she was diagnosed with \"fluid in my ear\" and was started on prednisone, she said symptoms continued, she said 2 days ago she noticed dark-colored stool and since that time has had about 3-4 total dark-colored bowel movements of similar appearance, most recently this morning.  She denies any vomiting or hematemesis.  Denies any abdominal pain.    Last colonoscopy appears to have been done in 2013, showing no polyps and she denies any family history of colon cancer, however she also had a Cologuard test done this September which was negative.  She does not think she has had an EGD at any point since her gastric bypass.  She denies any NSAID use.    Review of Systems   Constitutional:  Negative for activity change, appetite change, chills, fatigue, fever and unexpected weight change.   HENT:  Negative for congestion, nosebleeds, rhinorrhea, sore throat and trouble swallowing.    Eyes:  Negative for pain, itching and visual disturbance.   Respiratory:  Negative for cough, shortness of breath and wheezing.    Cardiovascular:  Negative for chest pain, palpitations and leg swelling.   Gastrointestinal: Negative.    Endocrine: Negative for cold intolerance, heat intolerance and polyuria.   Genitourinary:  Negative for difficulty urinating, dysuria, flank pain and hematuria.   Musculoskeletal:  Negative for arthralgias, back pain, myalgias and neck pain.   Skin:  Negative for color change, pallor and rash.   Neurological:  Negative for dizziness, speech difficulty, weakness, numbness and headaches.   Hematological:  Does not bruise/bleed easily.   Psychiatric/Behavioral:  Negative for dysphoric mood and sleep disturbance. The patient is not nervous/anxious.    All other systems reviewed and are negative.    I have reviewed the patient's PMH, PSH, Social History, Family History, Meds, and " Allergies  Historical Information   Past Medical History:   Diagnosis Date    Diabetes mellitus (HCC) 2023    Hypertension     Obesity     Had weight trell surgery     Past Surgical History:   Procedure Laterality Date    BARIATRIC SURGERY  2015    CARDIAC CATHETERIZATION N/A 2023    Procedure: Cardiac Coronary Angiogram;  Surgeon: Syed Baltazar DO;  Location: [facility];  Service: Cardiology    CARDIAC CATHETERIZATION N/A 2023    Procedure: Cardiac pci;  Surgeon: Syed Baltazar DO;  Location: [facility];  Service: Cardiology     SECTION      32 years ago (2019)     Social History     Tobacco Use    Smoking status: Former     Current packs/day: 0.00     Average packs/day: 1 pack/day for 20.0 years (20.0 ttl pk-yrs)     Types: Cigarettes     Start date:      Quit date:      Years since quittin.8    Smokeless tobacco: Never    Tobacco comments:     20 years ago.   Vaping Use    Vaping status: Never Used   Substance and Sexual Activity    Alcohol use: Never    Drug use: Never    Sexual activity: Not Currently     Partners: Male     Birth control/protection: Post-menopausal     E-Cigarette/Vaping    E-Cigarette Use Never User      E-Cigarette/Vaping Substances    Nicotine No     THC No     CBD No     Flavoring No     Other No     Unknown No        Social History     Tobacco Use    Smoking status: Former     Current packs/day: 0.00     Average packs/day: 1 pack/day for 20.0 years (20.0 ttl pk-yrs)     Types: Cigarettes     Start date:      Quit date:      Years since quittin.8    Smokeless tobacco: Never    Tobacco comments:     20 years ago.   Vaping Use    Vaping status: Never Used   Substance and Sexual Activity    Alcohol use: Never    Drug use: Never    Sexual activity: Not Currently     Partners: Male     Birth control/protection: Post-menopausal       Current Facility-Administered Medications:     atorvastatin (LIPITOR) tablet 40 mg, QPM     fluticasone (FLONASE) 50 mcg/act nasal spray 1 spray, Daily    insulin lispro (HumALOG/ADMELOG) 100 units/mL subcutaneous injection 1-6 Units, TID AC **AND** Fingerstick Glucose (POCT), TID AC    Objective :  Temp:  [97.2 °F (36.2 °C)] 97.2 °F (36.2 °C)  HR:  [93] 93  BP: (104)/(55) 104/55  Resp:  [16] 16  SpO2:  [98 %] 98 %  O2 Device: None (Room air)    Physical Exam  Constitutional:       General: She is not in acute distress.     Appearance: She is well-developed. She is not diaphoretic.   HENT:      Head: Normocephalic and atraumatic.   Eyes:      Conjunctiva/sclera: Conjunctivae normal.      Pupils: Pupils are equal, round, and reactive to light.   Cardiovascular:      Rate and Rhythm: Normal rate and regular rhythm.      Heart sounds: Normal heart sounds. No murmur heard.     No friction rub. No gallop.   Pulmonary:      Effort: Pulmonary effort is normal. No respiratory distress.      Breath sounds: Normal breath sounds. No stridor. No wheezing or rales.   Abdominal:      General: Bowel sounds are normal. There is no distension.      Palpations: Abdomen is soft. There is no mass.      Tenderness: There is no abdominal tenderness. There is no guarding or rebound.   Musculoskeletal:         General: No tenderness. Normal range of motion.      Cervical back: Normal range of motion and neck supple.   Skin:     General: Skin is warm and dry.      Coloration: Skin is not pale.      Findings: No erythema or rash.   Neurological:      Mental Status: She is alert and oriented to person, place, and time.   Psychiatric:         Behavior: Behavior normal.           Lab Results: I have reviewed the following results:CBC/BMP:   .     10/25/24  0848   WBC 9.04   HGB 4.2*   HCT 13.8*   *   SODIUM 134*   K 4.3      CO2 24   BUN 64*   CREATININE 1.09   GLUC 278*    , Creatinine Clearance: Estimated Creatinine Clearance: 56.5 mL/min (by C-G formula based on SCr of 1.09 mg/dL)., LFTs:   .     10/25/24  0848   AST  8*   ALT 7   ALB 3.4*   TBILI 0.21   ALKPHOS 52    , PTT/INR:No new results in last 24 hours.

## 2024-10-25 NOTE — ASSESSMENT & PLAN NOTE
BP Readings from Last 1 Encounters:   10/25/24 104/55     Soft blood pressure  Home regimen: Lisinopril-HCTZ, metoprolol 25 mg daily  Monitor off home medications.

## 2024-10-25 NOTE — ASSESSMENT & PLAN NOTE
History of CAD 6/2023 s/p drug-eluting stent LAD   Per Dr. Fisher in 7/2024, patient should be on Plavix and aspirin till the end of 2024  However, given anemia we will hold off Plavix and aspirin.  Consider discussion with cardiology after resuscitation and GI commendations regarding continuing Plavix.

## 2024-10-26 ENCOUNTER — ANESTHESIA EVENT (INPATIENT)
Dept: GASTROENTEROLOGY | Facility: HOSPITAL | Age: 64
DRG: 378 | End: 2024-10-26
Payer: COMMERCIAL

## 2024-10-26 ENCOUNTER — ANESTHESIA (INPATIENT)
Dept: GASTROENTEROLOGY | Facility: HOSPITAL | Age: 64
DRG: 378 | End: 2024-10-26
Payer: COMMERCIAL

## 2024-10-26 ENCOUNTER — APPOINTMENT (OUTPATIENT)
Dept: GASTROENTEROLOGY | Facility: HOSPITAL | Age: 64
DRG: 378 | End: 2024-10-26
Payer: COMMERCIAL

## 2024-10-26 LAB
ABO GROUP BLD BPU: NORMAL
ANION GAP SERPL CALCULATED.3IONS-SCNC: 7 MMOL/L (ref 4–13)
BPU ID: NORMAL
BUN SERPL-MCNC: 42 MG/DL (ref 5–25)
CALCIUM SERPL-MCNC: 9 MG/DL (ref 8.4–10.2)
CHLORIDE SERPL-SCNC: 105 MMOL/L (ref 96–108)
CO2 SERPL-SCNC: 25 MMOL/L (ref 21–32)
CREAT SERPL-MCNC: 1.11 MG/DL (ref 0.6–1.3)
CROSSMATCH: NORMAL
FERRITIN SERPL-MCNC: 15 NG/ML (ref 11–307)
GFR SERPL CREATININE-BSD FRML MDRD: 51 ML/MIN/1.73SQ M
GLUCOSE SERPL-MCNC: 166 MG/DL (ref 65–140)
GLUCOSE SERPL-MCNC: 169 MG/DL (ref 65–140)
GLUCOSE SERPL-MCNC: 180 MG/DL (ref 65–140)
GLUCOSE SERPL-MCNC: 194 MG/DL (ref 65–140)
HCT VFR BLD AUTO: 22.9 % (ref 34.8–46.1)
HCT VFR BLD AUTO: 24.5 % (ref 34.8–46.1)
HCT VFR BLD AUTO: 25.1 % (ref 34.8–46.1)
HGB BLD-MCNC: 7.5 G/DL (ref 11.5–15.4)
HGB BLD-MCNC: 8 G/DL (ref 11.5–15.4)
HGB BLD-MCNC: 8.2 G/DL (ref 11.5–15.4)
IRON SATN MFR SERPL: 16 % (ref 15–50)
IRON SERPL-MCNC: 61 UG/DL (ref 50–212)
MAGNESIUM SERPL-MCNC: 1.7 MG/DL (ref 1.9–2.7)
POTASSIUM SERPL-SCNC: 4.7 MMOL/L (ref 3.5–5.3)
SODIUM SERPL-SCNC: 137 MMOL/L (ref 135–147)
TIBC SERPL-MCNC: 379 UG/DL (ref 250–450)
UIBC SERPL-MCNC: 318 UG/DL (ref 155–355)
UNIT DISPENSE STATUS: NORMAL
UNIT PRODUCT CODE: NORMAL
UNIT PRODUCT VOLUME: 350 ML
UNIT RH: NORMAL

## 2024-10-26 PROCEDURE — 83735 ASSAY OF MAGNESIUM: CPT

## 2024-10-26 PROCEDURE — 82948 REAGENT STRIP/BLOOD GLUCOSE: CPT

## 2024-10-26 PROCEDURE — 82728 ASSAY OF FERRITIN: CPT

## 2024-10-26 PROCEDURE — 80048 BASIC METABOLIC PNL TOTAL CA: CPT

## 2024-10-26 PROCEDURE — 85018 HEMOGLOBIN: CPT | Performed by: INTERNAL MEDICINE

## 2024-10-26 PROCEDURE — 85014 HEMATOCRIT: CPT | Performed by: INTERNAL MEDICINE

## 2024-10-26 PROCEDURE — 43235 EGD DIAGNOSTIC BRUSH WASH: CPT | Performed by: INTERNAL MEDICINE

## 2024-10-26 PROCEDURE — 83550 IRON BINDING TEST: CPT

## 2024-10-26 PROCEDURE — 99232 SBSQ HOSP IP/OBS MODERATE 35: CPT | Performed by: INTERNAL MEDICINE

## 2024-10-26 PROCEDURE — 83540 ASSAY OF IRON: CPT

## 2024-10-26 PROCEDURE — 85014 HEMATOCRIT: CPT

## 2024-10-26 PROCEDURE — 0DJ08ZZ INSPECTION OF UPPER INTESTINAL TRACT, VIA NATURAL OR ARTIFICIAL OPENING ENDOSCOPIC: ICD-10-PCS | Performed by: INTERNAL MEDICINE

## 2024-10-26 PROCEDURE — 85018 HEMOGLOBIN: CPT

## 2024-10-26 RX ORDER — ONDANSETRON 2 MG/ML
4 INJECTION INTRAMUSCULAR; INTRAVENOUS ONCE AS NEEDED
Status: DISCONTINUED | OUTPATIENT
Start: 2024-10-26 | End: 2024-10-26 | Stop reason: HOSPADM

## 2024-10-26 RX ORDER — LIDOCAINE HYDROCHLORIDE 20 MG/ML
INJECTION, SOLUTION EPIDURAL; INFILTRATION; INTRACAUDAL; PERINEURAL AS NEEDED
Status: DISCONTINUED | OUTPATIENT
Start: 2024-10-26 | End: 2024-10-26

## 2024-10-26 RX ORDER — PANTOPRAZOLE SODIUM 40 MG/10ML
40 INJECTION, POWDER, LYOPHILIZED, FOR SOLUTION INTRAVENOUS EVERY 12 HOURS
Status: DISCONTINUED | OUTPATIENT
Start: 2024-10-26 | End: 2024-10-26

## 2024-10-26 RX ORDER — ASPIRIN 81 MG/1
81 TABLET, CHEWABLE ORAL DAILY
Status: DISCONTINUED | OUTPATIENT
Start: 2024-10-26 | End: 2024-10-27 | Stop reason: HOSPADM

## 2024-10-26 RX ORDER — MAGNESIUM SULFATE HEPTAHYDRATE 40 MG/ML
2 INJECTION, SOLUTION INTRAVENOUS ONCE
Status: COMPLETED | OUTPATIENT
Start: 2024-10-26 | End: 2024-10-26

## 2024-10-26 RX ORDER — SUCRALFATE 1 G/1
1 TABLET ORAL
Status: DISCONTINUED | OUTPATIENT
Start: 2024-10-26 | End: 2024-10-26

## 2024-10-26 RX ORDER — PROPOFOL 10 MG/ML
INJECTION, EMULSION INTRAVENOUS AS NEEDED
Status: DISCONTINUED | OUTPATIENT
Start: 2024-10-26 | End: 2024-10-26

## 2024-10-26 RX ORDER — ALBUTEROL SULFATE 0.83 MG/ML
2.5 SOLUTION RESPIRATORY (INHALATION) ONCE AS NEEDED
Status: DISCONTINUED | OUTPATIENT
Start: 2024-10-26 | End: 2024-10-26 | Stop reason: HOSPADM

## 2024-10-26 RX ORDER — PANTOPRAZOLE SODIUM 40 MG/10ML
40 INJECTION, POWDER, LYOPHILIZED, FOR SOLUTION INTRAVENOUS EVERY 12 HOURS SCHEDULED
Status: DISCONTINUED | OUTPATIENT
Start: 2024-10-26 | End: 2024-10-26

## 2024-10-26 RX ORDER — PANTOPRAZOLE SODIUM 40 MG/10ML
40 INJECTION, POWDER, LYOPHILIZED, FOR SOLUTION INTRAVENOUS EVERY 12 HOURS SCHEDULED
Status: DISCONTINUED | OUTPATIENT
Start: 2024-10-26 | End: 2024-10-27

## 2024-10-26 RX ORDER — SODIUM CHLORIDE, SODIUM LACTATE, POTASSIUM CHLORIDE, CALCIUM CHLORIDE 600; 310; 30; 20 MG/100ML; MG/100ML; MG/100ML; MG/100ML
INJECTION, SOLUTION INTRAVENOUS CONTINUOUS PRN
Status: DISCONTINUED | OUTPATIENT
Start: 2024-10-26 | End: 2024-10-26

## 2024-10-26 RX ORDER — CLOPIDOGREL BISULFATE 75 MG/1
75 TABLET ORAL DAILY
Status: DISCONTINUED | OUTPATIENT
Start: 2024-10-26 | End: 2024-10-27 | Stop reason: HOSPADM

## 2024-10-26 RX ORDER — SUCRALFATE 1 G/1
1 TABLET ORAL EVERY 6 HOURS SCHEDULED
Status: DISCONTINUED | OUTPATIENT
Start: 2024-10-26 | End: 2024-10-27 | Stop reason: HOSPADM

## 2024-10-26 RX ADMIN — PANTOPRAZOLE SODIUM 40 MG: 40 INJECTION, POWDER, FOR SOLUTION INTRAVENOUS at 20:57

## 2024-10-26 RX ADMIN — PROPOFOL 40 MG: 10 INJECTION, EMULSION INTRAVENOUS at 11:56

## 2024-10-26 RX ADMIN — PROPOFOL 40 MG: 10 INJECTION, EMULSION INTRAVENOUS at 11:52

## 2024-10-26 RX ADMIN — PROPOFOL 40 MG: 10 INJECTION, EMULSION INTRAVENOUS at 11:54

## 2024-10-26 RX ADMIN — INSULIN LISPRO 2 UNITS: 100 INJECTION, SOLUTION INTRAVENOUS; SUBCUTANEOUS at 16:53

## 2024-10-26 RX ADMIN — SODIUM CHLORIDE, SODIUM LACTATE, POTASSIUM CHLORIDE, AND CALCIUM CHLORIDE: .6; .31; .03; .02 INJECTION, SOLUTION INTRAVENOUS at 11:40

## 2024-10-26 RX ADMIN — PANTOPRAZOLE SODIUM 40 MG: 40 INJECTION, POWDER, FOR SOLUTION INTRAVENOUS at 14:22

## 2024-10-26 RX ADMIN — PROPOFOL 120 MG: 10 INJECTION, EMULSION INTRAVENOUS at 11:51

## 2024-10-26 RX ADMIN — CLOPIDOGREL BISULFATE 75 MG: 75 TABLET ORAL at 14:22

## 2024-10-26 RX ADMIN — SUCRALFATE 1 G: 1 TABLET ORAL at 17:48

## 2024-10-26 RX ADMIN — SUCRALFATE 1 G: 1 TABLET ORAL at 14:21

## 2024-10-26 RX ADMIN — ASPIRIN 81 MG 81 MG: 81 TABLET ORAL at 14:21

## 2024-10-26 RX ADMIN — SODIUM CHLORIDE 8 MG/HR: 9 INJECTION, SOLUTION INTRAVENOUS at 06:13

## 2024-10-26 RX ADMIN — SUCRALFATE 1 G: 1 TABLET ORAL at 23:39

## 2024-10-26 RX ADMIN — MAGNESIUM SULFATE HEPTAHYDRATE 2 G: 40 INJECTION, SOLUTION INTRAVENOUS at 06:13

## 2024-10-26 RX ADMIN — ATORVASTATIN CALCIUM 40 MG: 40 TABLET, FILM COATED ORAL at 17:48

## 2024-10-26 RX ADMIN — LIDOCAINE HYDROCHLORIDE 80 MG: 20 INJECTION, SOLUTION EPIDURAL; INFILTRATION; INTRACAUDAL at 11:51

## 2024-10-26 NOTE — ASSESSMENT & PLAN NOTE
Assessment:  Presented with lightheadedness and fatigue for the past 3 days.  Patient was seen at a urgent care on Tuesday where they diagnosed her with ear fullness and prescribed her prednisone for 5 days however she did not notice any improvement and presented to [facility] ED.  Reports melena that started on Tuesday.   No NSAIDs use.  Hemoglobin 4.2  Elevated BUN 64  Baseline hemoglobin (9-11). Hgb was normal prior to 4/2023  Hemodynamically stable    Hemoglobin   Date Value Ref Range Status   10/25/2024 7.5 (L) 11.5 - 15.4 g/dL Final   08/01/2015 11.9 11.5 - 15.4 g/dL Final     Last colonoscopy in 2013 was normal with no polyps.  Normal Cologuard on 9/26  In the ED, received Protonix 80 mg IV.    Plan:  PRBCs transfusions  GI conducted EGD  Deep cratered ulcer at gastrojejunal anastomosis with clean base in the body of stomach, without bleeding identified, friable mucosa.  Second ulcer seen in the same region.  Normal esophagus and duodenum.  Recommended repeat EGD in 3 months  For liquid diet advance as tolerated  Resumed aspirin and Plavix s/p EGD  Protonix twice daily  H&H every 12 hours  Monitor vital signs closely  Hold off DVT prophylaxis  AM BMP     monthly or less

## 2024-10-26 NOTE — UTILIZATION REVIEW
Initial Clinical Review    Admission: Date/Time/Statement:   Admission Orders (From admission, onward)       Ordered        10/25/24 1030  INPATIENT ADMISSION  Once                          Orders Placed This Encounter   Procedures    INPATIENT ADMISSION     Standing Status:   Standing     Number of Occurrences:   1     Order Specific Question:   Level of Care     Answer:   Med Surg [16]     Order Specific Question:   Estimated length of stay     Answer:   More than 2 Midnights     Order Specific Question:   Certification     Answer:   I certify that inpatient services are medically necessary for this patient for a duration of greater than two midnights. See H&P and MD Progress Notes for additional information about the patient's course of treatment.     ED Arrival Information       Expected   -    Arrival   10/25/2024 07:41    Acuity   Urgent              Means of arrival   Walk-In    Escorted by   Spouse    Service   Hospitalist    Admission type   Emergency              Arrival complaint   ear problem             Chief Complaint   Patient presents with    Medical Problem     Pt reports went to  for ear problems and dx with fluid in ears earlier in week, given steroids, states dizziness has gotten worse along with thumping in ears       Initial Presentation: 64 y.o. female with hx DM2, CAD s/p LAD stent 6/2023, HTN, Tramaine-en-Y bypass who presents to ED from home with lightheadedness x 3 days. Pt was seen at urgent care on 10/22 where she was diagnosed with otitis media and prescribed prednisone for 5 days. However, she did not notice any improvement and continued to feel lightheaded. She also experienced fatigue and generalized weakness. Reports that she had melena for the past 3 days. No NSAIDs use . On exam, abdomen soft, non tender. Had trace BLE edema. Pt tachycardic .BP soft . .  Serous otitis media, left . Labs hgb 4.2 from baseline 9-11, BUN 64 . Pt started on IV PPI drip in ED. Pt being transfused with 3  U PRBC. Pt admitted as Inpatient with acute blood loss anemia 2/2 GI bleed. Plan- Continue PRBC transfusion . H/H q8h. GI consult . Close VS monitoring. Hold off on DVT ppx. BMP in am . Hold home Lisinopril - HCTZ and metoprolol for now . Continue PPI drip .    GI consult - Anemia and melena: Highly suspect upper GI bleed in the setting of antiplatelet therapy, history of Tramaine-en-Y gastric bypass . Close monitoring hgb .Recommend to transfuse and resuscitate until hgb >7 given her coronary risk factors . PPI drip. NPO today for EGD tomorrow likely . Hold plavix.     Date: 10/26    Day 2:    Hgb 8.0 this morning . Pt s/p EGD today that showed a deep cratered ulcer in the jejunal side of the gastrojejunal anastomosis , second ulcer was also seen in the gastrojejunal anastomosis , both were clean-based . No bleeding identified  GI recommends IV PPI BID.  IV PPI drip d/c'ed .  CL diet then advance as eloy. Repeat EGD 3 months. Ok to resume ASA and plavix .   Obtaining H/H q12h . Obtain ferritin, TIBC panel .         ED Treatment-Medication Administration from 10/25/2024 0741 to 10/25/2024 1654         Date/Time Order Dose Route Action     10/25/2024 0842 fluticasone (FLONASE) 50 mcg/act nasal spray 1 spray 1 spray Each Nare Given     10/25/2024 0842 loratadine (CLARITIN) tablet 10 mg 10 mg Oral Given     10/25/2024 1001 pantoprazole (PROTONIX) 80 mg in sodium chloride 0.9 % 100 mL IVPB 80 mg Intravenous New Bag     10/25/2024 1438 pantoprazole (PROTONIX) 80 mg in sodium chloride 0.9 % 100 mL infusion 8 mg/hr Intravenous New Bag            Scheduled Medications:  aspirin, 81 mg, Oral, Daily  atorvastatin, 40 mg, Oral, QPM  clopidogrel, 75 mg, Oral, Daily  fluticasone, 1 spray, Each Nare, Daily  insulin lispro, 1-6 Units, Subcutaneous, TID AC  pantoprazole, 40 mg, Intravenous, Q12H ROSA  sucralfate, 1 g, Oral, Q6H ROSA  sucralfate, 1 g, Oral, 4x Daily (AC & HS)    magnesium sulfate 2 g/50 mL IVPB (premix) 2 g  Dose: 2  g  Freq: Once Route: IV  Last Dose: 2 g (10/26/24 0613)  Start: 10/26/24 0600 End: 10/26/24 0813  Continuous IV Infusions:   pantoprazole (PROTONIX) 80 mg in sodium chloride 0.9 % 100 mL infusion  Rate: 10 mL/hr Dose: 8 mg/hr  Freq: Continuous Route: IV  Last Dose: Stopped (10/26/24 1305)  Start: 10/25/24 1400 End: 10/26/24 1239  PRN Meds:  albuterol, 2.5 mg, Nebulization, Once PRN  ondansetron, 4 mg, Intravenous, Once PRN      ED Triage Vitals   Temperature Pulse Respirations Blood Pressure SpO2 Pain Score   10/25/24 0752 10/25/24 0752 10/25/24 0752 10/25/24 0752 10/25/24 0752 10/25/24 1700   (!) 97.2 °F (36.2 °C) 93 16 104/55 98 % No Pain     Weight (last 2 days)       None            Vital Signs (last 3 days)       Date/Time Temp Pulse Resp BP MAP (mmHg) SpO2 O2 Device Cardiac (WDL) Patient Position - Orthostatic VS Jamison Coma Scale Score Pain    10/26/24 1216 97.6 °F (36.4 °C) 69 20 101/54 -- 100 % -- WDL -- 15 No Pain    10/26/24 1201 97.6 °F (36.4 °C) 76 18 100/56 -- 100 % -- WDL -- 15 No Pain    10/26/24 1047 97.7 °F (36.5 °C) 91 18 137/65 -- 100 % None (Room air) -- -- -- --    10/26/24 07:17:15 98.2 °F (36.8 °C) 63 18 105/62 76 100 % -- -- -- -- --    10/26/24 0000 -- -- -- -- -- -- None (Room air) -- -- -- --    10/25/24 2145 -- -- 16 -- -- -- -- -- -- -- --    10/25/24 21:44:49 98 °F (36.7 °C) 79 16 115/73 87 98 % None (Room air) -- -- -- --    10/25/24 19:06:16 99 °F (37.2 °C) -- 18 105/68 80 100 % None (Room air) -- -- -- --    10/25/24 18:52:28 98.9 °F (37.2 °C) 82 18 106/63 77 99 % -- -- -- -- --    10/25/24 1805 -- -- 18 -- -- -- -- -- -- -- --    10/25/24 18:04:19 98.5 °F (36.9 °C) 79 18 102/58 73 100 % -- -- -- -- --    10/25/24 1804 -- -- 18 -- -- -- -- -- -- -- --    10/25/24 1725 -- -- -- -- -- -- None (Room air) -- -- -- No Pain    10/25/24 1700 -- -- -- -- -- -- -- -- -- -- No Pain    10/25/24 1620 97.5 °F (36.4 °C) 84 16 115/62 81 99 % None (Room air) -- -- -- --    10/25/24 1615 -- 84 --  115/62 81 100 % -- -- -- -- --    10/25/24 1600 -- 87 -- 104/56 75 100 % -- -- -- -- --    10/25/24 1550 98.1 °F (36.7 °C) 81 16 108/55 75 100 % None (Room air) -- -- -- --    10/25/24 1545 -- 80 -- 94/54 69 99 % -- -- -- -- --    10/25/24 1535 98 °F (36.7 °C) 76 16 96/53 -- 99 % None (Room air) -- -- -- --    10/25/24 1530 -- 76 16 92/53 68 99 % None (Room air) -- -- -- --    10/25/24 1525 98.6 °F (37 °C) 77 16 92/52 -- 99 % None (Room air) -- -- -- --    10/25/24 1520 98.9 °F (37.2 °C) 81 16 101/58 -- 100 % None (Room air) -- -- -- --    10/25/24 1515 98.9 °F (37.2 °C) 79 16 101/58 -- -- -- -- -- -- --    10/25/24 1455 98.4 °F (36.9 °C) 83 16 100/61 75 100 % None (Room air) -- Lying -- --    10/25/24 1415 -- -- -- -- -- 100 % None (Room air) -- -- -- --    10/25/24 1400 -- 86 16 107/60 79 98 % None (Room air) -- Lying -- --    10/25/24 1345 -- -- -- -- -- 100 % None (Room air) -- -- -- --    10/25/24 1330 -- 91 16 99/57 72 100 % None (Room air) -- Lying -- --    10/25/24 1315 -- 85 16 95/56 71 100 % None (Room air) -- Lying -- --    10/25/24 1305 98.2 °F (36.8 °C) 88 16 95/56 77 96 % None (Room air) -- Lying -- --    10/25/24 1245 98.3 °F (36.8 °C) 87 16 99/57 -- 100 % None (Room air) -- -- -- --    10/25/24 1235 98.3 °F (36.8 °C) 87 16 100/56 73 100 % None (Room air) -- -- -- --    10/25/24 1220 98 °F (36.7 °C) 92 16 110/64 -- 99 % None (Room air) -- -- -- --    10/25/24 1215 98.2 °F (36.8 °C) 88 16 108/57 -- 100 % None (Room air) -- -- -- --    10/25/24 1210 98.2 °F (36.8 °C) 92 16 103/56 -- 99 % None (Room air) -- -- -- --    10/25/24 1205 98 °F (36.7 °C) 91 16 115/56 -- 99 % None (Room air) -- -- -- --    10/25/24 1200 97.9 °F (36.6 °C) 92 16 120/61 -- -- -- -- -- -- --    10/25/24 0752 97.2 °F (36.2 °C) 93 16 104/55 73 98 % None (Room air) -- Sitting -- --              Pertinent Labs/Diagnostic Test Results:   Radiology:  XR chest 2 views   ED Interpretation by Syed Guaman MD (10/25 0859)   Per my  independent interpretation: No acute cardiopulmonary process      Final Interpretation by Zafar Grimm MD (10/25 0855)      No acute cardiopulmonary disease.            Workstation performed: [id number]           Cardiology:   10/25 ECG- Normal sinus rhythm  Nonspecific T wave abnormality    GI:  EGD   Final Result by Osmel Cunha MD (10/26 1201)   Deep cratered ulcer in the jejunal side of the gastrojejunal anastomosis    with a clean base, no stigmata.  Friable mucosa, second ulcer was also    seen in the gastrojejunal anastomosis which was also clean-based.   Clean-based ulcer within the gastric body just proximal to the anastomosis    as well was identified   Normal esophagus   Normal small bowel         RECOMMENDATION:   Schedule repeat EGD       Return to floor   Clear liquid diet and advance as tolerated   Twice daily PPI therapy, Carafate   Repeat EGD in 3 months   Okay to resume aspirin and Plavix                 Osmel Cunha MD               Results from last 7 days   Lab Units 10/26/24  1009 10/25/24  2354 10/25/24  1635 10/25/24  0848   WBC Thousand/uL  --   --   --  9.04   HEMOGLOBIN g/dL 8.0* 7.5* 7.2* 4.2*   HEMATOCRIT % 24.5* 22.9* 22.6* 13.8*   PLATELETS Thousands/uL  --   --   --  413*   TOTAL NEUT ABS Thousands/µL  --   --   --  6.34         Results from last 7 days   Lab Units 10/26/24  0428 10/25/24  0848   SODIUM mmol/L 137 134*   POTASSIUM mmol/L 4.7 4.3   CHLORIDE mmol/L 105 102   CO2 mmol/L 25 24   ANION GAP mmol/L 7 8   BUN mg/dL 42* 64*   CREATININE mg/dL 1.11 1.09   EGFR ml/min/1.73sq m 51 53   CALCIUM mg/dL 9.0 9.0   MAGNESIUM mg/dL 1.7*  --      Results from last 7 days   Lab Units 10/25/24  0848   AST U/L 8*   ALT U/L 7   ALK PHOS U/L 52   TOTAL PROTEIN g/dL 6.3*   ALBUMIN g/dL 3.4*   TOTAL BILIRUBIN mg/dL 0.21     Results from last 7 days   Lab Units 10/26/24  0716 10/25/24  2028 10/25/24  1830 10/25/24  1203   POC GLUCOSE mg/dl 180* 180* 176* 219*     Results from last 7 days   Lab  Units 10/26/24  0428 10/25/24  0848   GLUCOSE RANDOM mg/dL 166* 278*             BETA-HYDROXYBUTYRATE   Date Value Ref Range Status   04/15/2023 1.1 (H) <0.6 mmol/L Final                      Results from last 7 days   Lab Units 10/25/24  1444 10/25/24  1148 10/25/24  0848   HS TNI 0HR ng/L  --   --  <2   HS TNI 2HR ng/L  --  <2  --    HS TNI 4HR ng/L <2  --   --                Results from last 7 days   Lab Units 10/26/24  0547   UNIT PRODUCT CODE  [id number]  [id number]  [id number]   UNIT NUMBER  [id number]  [id number]  [id number]   UNITABO  O  O  O   UNITRH  POS  POS  POS   CROSSMATCH  Compatible  Compatible  Compatible   UNIT DISPENSE STATUS  Presumed Trans  Presumed Trans  Presumed Trans   UNIT PRODUCT VOL ml 350  350  350           Results from last 7 days   Lab Units 10/25/24  0841   CLARITY UA  Clear   COLOR UA  Colorless   SPEC GRAV UA  1.019   PH UA  5.0   GLUCOSE UA mg/dl Negative   KETONES UA mg/dl Negative   BLOOD UA  Negative   PROTEIN UA mg/dl Negative   NITRITE UA  Negative   BILIRUBIN UA  Negative   UROBILINOGEN UA (BE) mg/dl <2.0   LEUKOCYTES UA  Large*   WBC UA /hpf 2-4*   RBC UA /hpf None Seen   BACTERIA UA /hpf Occasional   EPITHELIAL CELLS WET PREP /hpf Occasional                 Past Medical History:   Diagnosis Date    Diabetes mellitus (HCC) 4 2023    Hypertension     Obesity     Had weight trell surgery     Present on Admission:   Hyperlipidemia   Type 2 diabetes mellitus with hyperglycemia, without long-term current use of insulin ([facility])   Coronary artery disease involving native coronary artery   Benign essential hypertension      Admitting Diagnosis: Anemia [D64.9]  GI bleed [K92.2]  Age/Sex: 64 y.o. female    Network Utilization Review Department  ATTENTION: Please call with any questions or concerns to [phone] and carefully listen to the prompts so that you are directed to the right person. All voicemails are confidential.   For Discharge needs, contact Care  Management DC Support Team at [phone] opt. 2  Send all requests for admission clinical reviews, approved or denied determinations and any other requests to dedicated fax number below belonging to the campus where the patient is receiving treatment. List of dedicated fax numbers for the Facilities:  FACILITY NAME UR FAX NUMBER   ADMISSION DENIALS (Administrative/Medical Necessity) [phone]   DISCHARGE SUPPORT TEAM (NETWORK) [phone]   PARENT CHILD HEALTH (Maternity/NICU/Pediatrics) [phone]   [facility] [phone]   [facility] [phone]   [facility] [phone]   [facility] [phone]   [facility] [phone]   [facility] [phone]   [facility] [phone]   [facility] [phone]   [facility] [phone]   [facility] [phone]   [facility] [phone]   [facility] [phone]

## 2024-10-26 NOTE — ANESTHESIA PREPROCEDURE EVALUATION
Procedure:  EGD    Relevant Problems   CARDIO   (+) Atrial fibrillation (HCC)   (+) Benign essential hypertension   (+) Coronary artery disease involving native coronary artery   (+) Hyperlipidemia      ENDO   (+) Type 2 diabetes mellitus with hyperglycemia, without long-term current use of insulin (HCC)      HEMATOLOGY   (+) Acute blood loss anemia      MUSCULOSKELETAL   (+) Arthritis      Stress test in 6/2023 showing ischemia with angiogram 3 days later and LAD stent performed. On ASA and plavix to be taken until end of 2024, but held since Thursday 2/2 bleeding.  Reports 1 episode of Afib many years ago with no recurrence      Patient reports METS >4   Reports tarry black stools twice this week  Physical Exam    Airway    Mallampati score: I         Dental        Cardiovascular  Cardiovascular exam normal    Pulmonary  Pulmonary exam normal Breath sounds clear to auscultation, Breath sounds normal    Other Findings  post-pubertal.      Anesthesia Plan  ASA Score- 3 Emergent    Anesthesia Type- IV sedation with anesthesia with ASA Monitors.         Additional Monitors:     Airway Plan: ETT.    Comment: IV Sedation vs GA.       Plan Factors-Exercise tolerance (METS): >4 METS.    Chart reviewed. EKG reviewed.  Existing labs reviewed. Patient summary reviewed.    Patient is not a current smoker.              Induction- intravenous.    Postoperative Plan-     Perioperative Resuscitation Plan - Level 1 - Full Code.       Informed Consent- Anesthetic plan and risks discussed with patient.  I personally reviewed this patient with the CRNA. Discussed and agreed on the Anesthesia Plan with the CRNA..

## 2024-10-26 NOTE — ANESTHESIA POSTPROCEDURE EVALUATION
Post-Op Assessment Note    CV Status:  Stable  Pain Score: 0    Pain management: adequate       Mental Status:  Alert and awake   Hydration Status:  Euvolemic   PONV Controlled:  Controlled   Airway Patency:  Patent     Post Op Vitals Reviewed: Yes    No anethesia notable event occurred.    Staff: CRNA   Comments: Pt awake, alert, able to maintain own airway, VSS, report to recovery RN          Last Filed PACU Vitals:  Vitals Value Taken Time   Temp 97.6    Pulse 81    /56    Resp 16    SpO2 100% RA        Modified Lázaro:  No data recorded

## 2024-10-26 NOTE — ASSESSMENT & PLAN NOTE
BP Readings from Last 1 Encounters:   10/25/24 115/73     Soft blood pressure  Home regimen: Lisinopril-HCTZ, metoprolol 25 mg daily  Monitor off home medications.

## 2024-10-26 NOTE — ASSESSMENT & PLAN NOTE
Lab Results   Component Value Date    HGBA1C 8.3 (A) 08/09/2024       Recent Labs     10/25/24  1203 10/25/24  1830 10/25/24  2028   POCGLU 219* 176* 180*       Home regimen: Tresiba 10 units daily.  Metformin 500 mg BID  Insulin sliding scale while inpatient.  Glucose checks  Target glucose 140-180

## 2024-10-26 NOTE — PLAN OF CARE
Problem: PAIN - ADULT  Goal: Verbalizes/displays adequate comfort level or baseline comfort level  Description: Interventions:  - Encourage patient to monitor pain and request assistance  - Assess pain using appropriate pain scale  - Administer analgesics based on type and severity of pain and evaluate response  - Implement non-pharmacological measures as appropriate and evaluate response  - Consider cultural and social influences on pain and pain management  - Notify physician/advanced practitioner if interventions unsuccessful or patient reports new pain  Outcome: Progressing     Problem: INFECTION - ADULT  Goal: Absence or prevention of progression during hospitalization  Description: INTERVENTIONS:  - Assess and monitor for signs and symptoms of infection  - Monitor lab/diagnostic results  - Monitor all insertion sites, i.e. indwelling lines, tubes, and drains  - Monitor endotracheal if appropriate and nasal secretions for changes in amount and color  - Adams appropriate cooling/warming therapies per order  - Administer medications as ordered  - Instruct and encourage patient and family to use good hand hygiene technique  - Identify and instruct in appropriate isolation precautions for identified infection/condition  Outcome: Progressing  Goal: Absence of fever/infection during neutropenic period  Description: INTERVENTIONS:  - Monitor WBC    Outcome: Progressing     Problem: SAFETY ADULT  Goal: Patient will remain free of falls  Description: INTERVENTIONS:  - Educate patient/family on patient safety including physical limitations  - Instruct patient to call for assistance with activity   - Consult OT/PT to assist with strengthening/mobility   - Keep Call bell within reach  - Keep bed low and locked with side rails adjusted as appropriate  - Keep care items and personal belongings within reach  - Initiate and maintain comfort rounds  - Make Fall Risk Sign visible to staff  - Offer Toileting every 2 Hours,  in advance of need  - Initiate/Maintain bed alarm  - Obtain necessary fall risk management equipment  - Apply yellow socks and bracelet for high fall risk patients  - Consider moving patient to room near nurses station  Outcome: Progressing  Goal: Maintain or return to baseline ADL function  Description: INTERVENTIONS:  -  Assess patient's ability to carry out ADLs; assess patient's baseline for ADL function and identify physical deficits which impact ability to perform ADLs (bathing, care of mouth/teeth, toileting, grooming, dressing, etc.)  - Assess/evaluate cause of self-care deficits   - Assess range of motion  - Assess patient's mobility; develop plan if impaired  - Assess patient's need for assistive devices and provide as appropriate  - Encourage maximum independence but intervene and supervise when necessary  - Involve family in performance of ADLs  - Assess for home care needs following discharge   - Consider OT consult to assist with ADL evaluation and planning for discharge  - Provide patient education as appropriate  Outcome: Progressing  Goal: Maintains/Returns to pre admission functional level  Description: INTERVENTIONS:  - Perform AM-PAC 6 Click Basic Mobility/ Daily Activity assessment daily.  - Set and communicate daily mobility goal to care team and patient/family/caregiver.   - Collaborate with rehabilitation services on mobility goals if consulted  - Perform Range of Motion 2 times a day.  - Reposition patient every 2 hours.  - Dangle patient 2 times a day  - Stand patient 2 times a day  - Ambulate patient 3 times a day  - Out of bed to chair 3 times a day   - Out of bed for meals 3 times a day  - Out of bed for toileting  - Record patient progress and toleration of activity level   Outcome: Progressing     Problem: DISCHARGE PLANNING  Goal: Discharge to home or other facility with appropriate resources  Description: INTERVENTIONS:  - Identify barriers to discharge w/patient and caregiver  -  Arrange for needed discharge resources and transportation as appropriate  - Identify discharge learning needs (meds, wound care, etc.)  - Arrange for interpretive services to assist at discharge as needed  - Refer to Case Management Department for coordinating discharge planning if the patient needs post-hospital services based on physician/advanced practitioner order or complex needs related to functional status, cognitive ability, or social support system  Outcome: Progressing     Problem: Knowledge Deficit  Goal: Patient/family/caregiver demonstrates understanding of disease process, treatment plan, medications, and discharge instructions  Description: Complete learning assessment and assess knowledge base.  Interventions:  - Provide teaching at level of understanding  - Provide teaching via preferred learning methods  Outcome: Progressing

## 2024-10-26 NOTE — PROGRESS NOTES
Progress Note - Hospitalist   Name: Amanda Benavidez 64 y.o. female I MRN: [id number]  Unit/Bed#: S -01 I Date of Admission: 10/25/2024   Date of Service: 10/26/2024 I Hospital Day: 1    Assessment & Plan  Acute blood loss anemia  Assessment:  Presented with lightheadedness and fatigue for the past 3 days.  Patient was seen at a urgent care on Tuesday where they diagnosed her with ear fullness and prescribed her prednisone for 5 days however she did not notice any improvement and presented to [facility] ED.  Reports melena that started on Tuesday.   No NSAIDs use.  Hemoglobin 4.2  Elevated BUN 64  Baseline hemoglobin (9-11). Hgb was normal prior to 4/2023  Hemodynamically stable    Hemoglobin   Date Value Ref Range Status   10/25/2024 7.5 (L) 11.5 - 15.4 g/dL Final   08/01/2015 11.9 11.5 - 15.4 g/dL Final     Last colonoscopy in 2013 was normal with no polyps.  Normal Cologuard on 9/26  In the ED, received Protonix 80 mg IV.    Plan:  PRBCs transfusions  GI conducted EGD  Deep cratered ulcer at gastrojejunal anastomosis with clean base in the body of stomach, without bleeding identified, friable mucosa.  Second ulcer seen in the same region.  Normal esophagus and duodenum.  Recommended repeat EGD in 3 months  For liquid diet advance as tolerated  Resumed aspirin and Plavix s/p EGD  Protonix twice daily  H&H every 12 hours  Monitor vital signs closely  Hold off DVT prophylaxis  AM BMP    Coronary artery disease involving native coronary artery  History of CAD 6/2023 s/p drug-eluting stent LAD   Per Dr. Fisher in 7/2024, patient should be on Plavix and aspirin till the end of 2024    Plan:  Restarted aspirin and Plavix s/p EGD as per GI recommendations.  Benign essential hypertension  BP Readings from Last 1 Encounters:   10/25/24 115/73     Soft blood pressure  Home regimen: Lisinopril-HCTZ, metoprolol 25 mg daily  Monitor off home medications.  Type 2 diabetes mellitus with hyperglycemia, without  "long-term current use of insulin ([facility])  Lab Results   Component Value Date    HGBA1C 8.3 (A) 08/09/2024       Recent Labs     10/25/24  1203 10/25/24  1830 10/25/24  2028   POCGLU 219* 176* 180*       Home regimen: Tresiba 10 units daily.  Metformin 500 mg BID  Insulin sliding scale while inpatient.  Glucose checks  Target glucose 140-180    Hyperlipidemia  Continue home atorvastatin 40 mg    VTE Pharmacologic Prophylaxis: VTE Score: 3 High Risk (Score >/= 5) - Pharmacological DVT Prophylaxis Contraindicated. Sequential Compression Devices Ordered.    Mobility:   Basic Mobility Inpatient Raw Score: 24  JH-HLM Goal: 8: Walk 250 feet or more  JH-HLM Achieved: 8: Walk 250 feet ot more  JH-HLM Goal achieved. Continue to encourage appropriate mobility.    Patient Centered Rounds: I evaluated the patient without nursing staff present due to nurse performing other clinical duties    Discussions with Specialists or Other Care Team Provider: Yes    Education and Discussions with Family / Patient: Patient declined call to .     Current Length of Stay: 1 day(s)  Current Patient Status: Inpatient   Certification Statement: The patient will continue to require additional inpatient hospital stay due to monitor of hemoglobin due to bleeding ulcer.  Discharge Plan: Anticipate discharge in 24-48 hrs to home.    Code Status: Level 1 - Full Code    Subjective   Patient said she has been ambulating to the bathroom without shortness of breath or lightheadedness.  She notes that she is feeling \"100% better\" after the 3 units of RBCs she received. She states that prior to admission she had 3 days of melena, these bowel movements were soft in consistency.  She denies NSAID use and has no previous history of ulcers.  She denies fever/chills, nausea/vomiting, cough, shortness of breath, abdominal pain, changes in bladder habits.    Objective :  Temp:  [97.2 °F (36.2 °C)-99 °F (37.2 °C)] 98 °F (36.7 °C)  HR:  [76-93] 79  BP: " ()/(52-73) 115/73  Resp:  [16-18] 16  SpO2:  [96 %-100 %] 98 %  O2 Device: None (Room air)    There is no height or weight on file to calculate BMI.     Input and Output Summary (last 24 hours):     Intake/Output Summary (Last 24 hours) at 10/26/2024 0556  Last data filed at 10/25/2024 2145  Gross per 24 hour   Intake 1158.34 ml   Output --   Net 1158.34 ml       Physical Exam  Constitutional:       General: She is not in acute distress.     Appearance: She is not ill-appearing.   Cardiovascular:      Rate and Rhythm: Normal rate.      Heart sounds: Normal heart sounds. No murmur heard.     No gallop.   Pulmonary:      Effort: No respiratory distress.      Breath sounds: Normal breath sounds. No wheezing or rales.   Abdominal:      General: Bowel sounds are normal. There is no distension.      Tenderness: There is no abdominal tenderness. There is no guarding.   Musculoskeletal:      Right lower leg: No edema.      Left lower leg: No edema.   Neurological:      Mental Status: She is alert.           Lines/Drains:              Lab Results: I have reviewed the following results:   Results from last 7 days   Lab Units 10/25/24  2354 10/25/24  1635 10/25/24  0848   WBC Thousand/uL  --   --  9.04   HEMOGLOBIN g/dL 7.5*   < > 4.2*   HEMATOCRIT % 22.9*   < > 13.8*   PLATELETS Thousands/uL  --   --  413*   SEGS PCT %  --   --  71   LYMPHO PCT %  --   --  22   MONO PCT %  --   --  6   EOS PCT %  --   --  0    < > = values in this interval not displayed.     Results from last 7 days   Lab Units 10/26/24  0428 10/25/24  0848   SODIUM mmol/L 137 134*   POTASSIUM mmol/L 4.7 4.3   CHLORIDE mmol/L 105 102   CO2 mmol/L 25 24   BUN mg/dL 42* 64*   CREATININE mg/dL 1.11 1.09   ANION GAP mmol/L 7 8   CALCIUM mg/dL 9.0 9.0   ALBUMIN g/dL  --  3.4*   TOTAL BILIRUBIN mg/dL  --  0.21   ALK PHOS U/L  --  52   ALT U/L  --  7   AST U/L  --  8*   GLUCOSE RANDOM mg/dL 166* 278*         Results from last 7 days   Lab Units 10/25/24  2028  10/25/24  1830 10/25/24  1203   POC GLUCOSE mg/dl 180* 176* 219*               Recent Cultures (last 7 days):         Imaging Results Review: I reviewed radiology reports from this admission including: chest xray.  Other Study Results Review: EKG was reviewed.     Last 24 Hours Medication List:     Current Facility-Administered Medications:     atorvastatin (LIPITOR) tablet 40 mg, QPM    fluticasone (FLONASE) 50 mcg/act nasal spray 1 spray, Daily    insulin lispro (HumALOG/ADMELOG) 100 units/mL subcutaneous injection 1-6 Units, TID AC **AND** Fingerstick Glucose (POCT), TID AC    magnesium sulfate 2 g/50 mL IVPB (premix) 2 g, Once    pantoprazole (PROTONIX) 80 mg in sodium chloride 0.9 % 100 mL infusion, Continuous, Last Rate: 8 mg/hr (10/25/24 2149)    Administrative Statements   Today, Patient Was Seen By: Reece Boston MD      **Please Note: This note may have been constructed using a voice recognition system.**

## 2024-10-26 NOTE — ANESTHESIA POSTPROCEDURE EVALUATION
Post-Op Assessment Note    CV Status:  Stable    Pain management: adequate       Mental Status:  Alert and awake   Hydration Status:  Euvolemic   PONV Controlled:  Controlled   Airway Patency:  Patent     Post Op Vitals Reviewed: Yes    No anethesia notable event occurred.    Staff: Anesthesiologist, CRNA           Last Filed PACU Vitals:  Vitals Value Taken Time   Temp 97.6 °F (36.4 °C) 10/26/24 1216   Pulse 69 10/26/24 1216   /54 10/26/24 1216   Resp 20 10/26/24 1216   SpO2 100 % 10/26/24 1216       Modified Lázaro:  Activity: 2 (10/26/2024 12:16 PM)  Respiration: 2 (10/26/2024 12:16 PM)  Circulation: 2 (10/26/2024 12:16 PM)  Consciousness: 2 (10/26/2024 12:16 PM)  Oxygen Saturation: 2 (10/26/2024 12:16 PM)  Modified Lázaro Score: 10 (10/26/2024 12:16 PM)

## 2024-10-26 NOTE — ASSESSMENT & PLAN NOTE
History of CAD 6/2023 s/p drug-eluting stent LAD   Per Dr. Fisher in 7/2024, patient should be on Plavix and aspirin till the end of 2024    Plan:  Restarted aspirin and Plavix s/p EGD as per GI recommendations.

## 2024-10-27 VITALS
SYSTOLIC BLOOD PRESSURE: 128 MMHG | RESPIRATION RATE: 19 BRPM | DIASTOLIC BLOOD PRESSURE: 70 MMHG | OXYGEN SATURATION: 99 % | TEMPERATURE: 97.6 F | HEART RATE: 70 BPM

## 2024-10-27 LAB
ANION GAP SERPL CALCULATED.3IONS-SCNC: 6 MMOL/L (ref 4–13)
BUN SERPL-MCNC: 28 MG/DL (ref 5–25)
CALCIUM SERPL-MCNC: 9.3 MG/DL (ref 8.4–10.2)
CHLORIDE SERPL-SCNC: 104 MMOL/L (ref 96–108)
CO2 SERPL-SCNC: 29 MMOL/L (ref 21–32)
CREAT SERPL-MCNC: 1.15 MG/DL (ref 0.6–1.3)
GFR SERPL CREATININE-BSD FRML MDRD: 49 ML/MIN/1.73SQ M
GLUCOSE SERPL-MCNC: 170 MG/DL (ref 65–140)
GLUCOSE SERPL-MCNC: 187 MG/DL (ref 65–140)
GLUCOSE SERPL-MCNC: 261 MG/DL (ref 65–140)
HCT VFR BLD AUTO: 27.8 % (ref 34.8–46.1)
HGB BLD-MCNC: 9 G/DL (ref 11.5–15.4)
MAGNESIUM SERPL-MCNC: 1.9 MG/DL (ref 1.9–2.7)
POTASSIUM SERPL-SCNC: 5.2 MMOL/L (ref 3.5–5.3)
SODIUM SERPL-SCNC: 139 MMOL/L (ref 135–147)

## 2024-10-27 PROCEDURE — 82948 REAGENT STRIP/BLOOD GLUCOSE: CPT

## 2024-10-27 PROCEDURE — 83735 ASSAY OF MAGNESIUM: CPT

## 2024-10-27 PROCEDURE — 85018 HEMOGLOBIN: CPT | Performed by: INTERNAL MEDICINE

## 2024-10-27 PROCEDURE — 99232 SBSQ HOSP IP/OBS MODERATE 35: CPT | Performed by: INTERNAL MEDICINE

## 2024-10-27 PROCEDURE — 80048 BASIC METABOLIC PNL TOTAL CA: CPT

## 2024-10-27 PROCEDURE — 99239 HOSP IP/OBS DSCHRG MGMT >30: CPT | Performed by: INTERNAL MEDICINE

## 2024-10-27 PROCEDURE — 85014 HEMATOCRIT: CPT | Performed by: INTERNAL MEDICINE

## 2024-10-27 RX ORDER — ACETAMINOPHEN 325 MG/1
650 TABLET ORAL EVERY 6 HOURS PRN
Status: DISCONTINUED | OUTPATIENT
Start: 2024-10-27 | End: 2024-10-27 | Stop reason: HOSPADM

## 2024-10-27 RX ORDER — METOPROLOL SUCCINATE 25 MG/1
25 TABLET, EXTENDED RELEASE ORAL DAILY
Status: DISCONTINUED | OUTPATIENT
Start: 2024-10-27 | End: 2024-10-27 | Stop reason: HOSPADM

## 2024-10-27 RX ORDER — PANTOPRAZOLE SODIUM 40 MG/1
40 TABLET, DELAYED RELEASE ORAL
Qty: 60 TABLET | Refills: 0 | Status: SHIPPED | OUTPATIENT
Start: 2024-10-27 | End: 2024-11-26

## 2024-10-27 RX ORDER — SUCRALFATE 1 G/1
1 TABLET ORAL EVERY 6 HOURS SCHEDULED
Qty: 120 TABLET | Refills: 0 | Status: SHIPPED | OUTPATIENT
Start: 2024-10-27 | End: 2024-11-26

## 2024-10-27 RX ORDER — PANTOPRAZOLE SODIUM 40 MG/1
40 TABLET, DELAYED RELEASE ORAL
Status: DISCONTINUED | OUTPATIENT
Start: 2024-10-27 | End: 2024-10-27 | Stop reason: HOSPADM

## 2024-10-27 RX ADMIN — CLOPIDOGREL BISULFATE 75 MG: 75 TABLET ORAL at 09:43

## 2024-10-27 RX ADMIN — SUCRALFATE 1 G: 1 TABLET ORAL at 11:55

## 2024-10-27 RX ADMIN — ACETAMINOPHEN 650 MG: 325 TABLET ORAL at 13:44

## 2024-10-27 RX ADMIN — INSULIN LISPRO 3 UNITS: 100 INJECTION, SOLUTION INTRAVENOUS; SUBCUTANEOUS at 11:57

## 2024-10-27 RX ADMIN — PANTOPRAZOLE SODIUM 40 MG: 40 TABLET, DELAYED RELEASE ORAL at 09:43

## 2024-10-27 RX ADMIN — METOPROLOL SUCCINATE 25 MG: 25 TABLET, EXTENDED RELEASE ORAL at 11:55

## 2024-10-27 RX ADMIN — ASPIRIN 81 MG 81 MG: 81 TABLET ORAL at 09:43

## 2024-10-27 RX ADMIN — INSULIN LISPRO 1 UNITS: 100 INJECTION, SOLUTION INTRAVENOUS; SUBCUTANEOUS at 09:44

## 2024-10-27 RX ADMIN — SUCRALFATE 1 G: 1 TABLET ORAL at 05:50

## 2024-10-27 NOTE — ASSESSMENT & PLAN NOTE
BP Readings from Last 1 Encounters:   10/27/24 128/70     Soft blood pressure  Home regimen: Lisinopril-HCTZ, metoprolol 25 mg daily  Continue metoprolol at discharge  Continue to hold Prinzide until she is able to follow-up with her PCP as an outpatient

## 2024-10-27 NOTE — DISCHARGE SUMMARY
Discharge Summary - Hospitalist   Name: Amanda Benavidez 64 y.o. female I MRN: [id number]  Unit/Bed#: S -01 I Date of Admission: 10/25/2024   Date of Service: 10/27/2024 I Hospital Day: 2     Assessment & Plan  Acute blood loss anemia  Assessment:  Presented with lightheadedness and fatigue for the past 3 days.  Patient was seen at a urgent care on Tuesday where they diagnosed her with ear fullness and prescribed her prednisone for 5 days however she did not notice any improvement and presented to [facility] ED.  Reports melena that started on Tuesday.   No NSAIDs use.  Hemoglobin 4.2  Elevated BUN 64  Baseline hemoglobin (9-11). Hgb was normal prior to 4/2023  Hemodynamically stable    Hemoglobin   Date Value Ref Range Status   10/27/2024 9.0 (L) 11.5 - 15.4 g/dL Final   08/01/2015 11.9 11.5 - 15.4 g/dL Final     Last colonoscopy in 2013 was normal with no polyps.  Normal Cologuard on 9/26  In the ED, received Protonix 80 mg IV.    Plan:  PRBCs transfusions  GI conducted EGD  Deep cratered ulcer at gastrojejunal anastomosis with clean base in the body of stomach, without bleeding identified, friable mucosa.  Second ulcer seen in the same region.  Normal esophagus and duodenum.  For liquid diet advance as tolerated  Resumed aspirin and Plavix s/p EGD  Protonix twice daily  CBC in 1 week 9 EGD in 2 to 3 months per GI recommendations    Coronary artery disease involving native coronary artery  History of CAD 6/2023 s/p drug-eluting stent LAD   Per Dr. Fisher in 7/2024, patient should be on Plavix and aspirin till the end of 2024    Plan:  Restarted aspirin and Plavix s/p EGD as per GI recommendations.  Benign essential hypertension  BP Readings from Last 1 Encounters:   10/27/24 128/70     Soft blood pressure  Home regimen: Lisinopril-HCTZ, metoprolol 25 mg daily  Continue metoprolol at discharge  Continue to hold Prinzide until she is able to follow-up with her PCP as an outpatient  Type 2 diabetes  mellitus with hyperglycemia, without long-term current use of insulin ([facility])  Lab Results   Component Value Date    HGBA1C 8.3 (A) 08/09/2024       Recent Labs     10/26/24  1527 10/26/24  2118 10/27/24  0654 10/27/24  1112   POCGLU 194* 169* 170* 261*       Home regimen: Tresiba 10 units daily.  Metformin 500 mg BID  Continue at discharge    Hyperlipidemia  Continue home atorvastatin 40 mg     Medical Problems       Resolved Problems  Date Reviewed: 10/27/2024   None       Discharging Physician / Practitioner: Sissy Duncan MD  PCP: Laure Thomas DO  Admission Date:   Admission Orders (From admission, onward)       Ordered        10/25/24 1030  INPATIENT ADMISSION  Once                          Discharge Date: 10/27/24    Consultations During Hospital Stay:  Gastroenterology    Procedures Performed:   EGD    Significant Findings / Test Results:   Deep cratered ulcer in the jejunal side of the gastrojejunal anastomosis with a clean base, no stigmata.  Friable mucosa, second ulcer was also seen in the gastrojejunal anastomosis which was also clean-based.  Clean-based ulcer within the gastric body just proximal to the anastomosis as well was identified  Normal esophagus  Normal small bowel    Incidental Findings:   None   I reviewed the above mentioned incidental findings with the patient and/or family and they expressed understanding.    Test Results Pending at Discharge (will require follow up):   None     Outpatient Tests Requested:  CBC in 1 week  EGD in 2 to 3 months with gastroenterology    Complications:  none    Reason for Admission: Anemia    Hospital Course:   Amanda Benavidez is a 64 y.o. female patient who originally presented to the hospital on 10/25/2024 due to anemia, dark stool, lightheadedness    Hospital Course: MHx of  diabetes type 2,CAD s/p LAD stent 6/2023, hypertension, Tramaine-en-Y bypass who presents with lightheadedness.  Patient reports lightheadedness for the past 3 days.  he was seen at urgent  care on 10/22 where she was diagnosed with otitis media and prescribed prednisone for 5 days.  However, she did not notice any improvement and continued to feel lightheaded. She also experienced fatigue and generalized weakness. Reports that she had melena for the past 3 days.  No NSAIDs use. No prior smoking history. No alcohol. No extensive caffeine use.  Denies any chest pain, shortness of breath, or palpitations.  No hematuria.  No headache, numbness, or tingling.  No history of iron tablet use. Denies any weight loss, night sweats, appetite change, or chills.       During hospitalization patient underwent an EGD which demonstrated      Please see above list of diagnoses and related plan for additional information.     Condition at Discharge: good    Discharge Day Visit / Exam:   Subjective: Patient was seen and examined at bedside.  Patient has been comfortably no overt acute distress.  Patient endorsed she feels well and has no complaints at the time my evaluation.  Patient denies fever, chills, abdominal pain, NVD, hemoptysis, black/tarry stools, inability to tolerate p.o., or any other symptoms at this time.  Vitals: Blood Pressure: 128/70 (10/27/24 1155)  Pulse: 70 (10/27/24 1155)  Temperature: 97.6 °F (36.4 °C) (10/27/24 0652)  Temp Source: Temporal (10/26/24 1047)  Respirations: 19 (10/27/24 0652)  SpO2: 99 % (10/27/24 0652)  Physical Exam  Vitals and nursing note reviewed.   Constitutional:       General: She is not in acute distress.     Appearance: She is well-developed. She is not ill-appearing, toxic-appearing or diaphoretic.   HENT:      Head: Normocephalic and atraumatic.      Mouth/Throat:      Mouth: Mucous membranes are moist.   Eyes:      Extraocular Movements: Extraocular movements intact.      Conjunctiva/sclera: Conjunctivae normal.      Pupils: Pupils are equal, round, and reactive to light.   Cardiovascular:      Rate and Rhythm: Normal rate and regular rhythm.      Pulses: Normal pulses.       Heart sounds: Normal heart sounds. No murmur heard.     No friction rub. No gallop.   Pulmonary:      Effort: Pulmonary effort is normal. No respiratory distress.      Breath sounds: Normal breath sounds. No wheezing or rhonchi.   Chest:      Chest wall: No tenderness.   Abdominal:      General: Bowel sounds are normal. There is no distension.      Palpations: Abdomen is soft.      Tenderness: There is no abdominal tenderness. There is no right CVA tenderness, left CVA tenderness, guarding or rebound.   Musculoskeletal:         General: No tenderness. Normal range of motion.      Cervical back: Normal range of motion and neck supple.      Right lower leg: No edema.      Left lower leg: No edema.   Skin:     General: Skin is warm and dry.      Capillary Refill: Capillary refill takes less than 2 seconds.   Neurological:      General: No focal deficit present.      Mental Status: She is alert and oriented to person, place, and time.      Cranial Nerves: No cranial nerve deficit.      Sensory: No sensory deficit.      Motor: No weakness.      Coordination: Coordination normal.      Gait: Gait normal.   Psychiatric:         Mood and Affect: Mood normal.         Behavior: Behavior normal.         Thought Content: Thought content normal.          Discussion with Family: Patient declined call to .     Discharge instructions/Information to patient and family:   See after visit summary for information provided to patient and family.      Provisions for Follow-Up Care:  See after visit summary for information related to follow-up care and any pertinent home health orders.      Mobility at time of Discharge:   Basic Mobility Inpatient Raw Score: 24  JH-HLM Goal: 8: Walk 250 feet or more  JH-HLM Achieved: 8: Walk 250 feet ot more  HLM Goal achieved. Continue to encourage appropriate mobility.     Disposition:   Home    Planned Readmission: None    Discharge Medications:  See after visit summary for reconciled  discharge medications provided to patient and/or family.      Administrative Statements   Discharge Statement:  I have spent a total time of 35 minutes in caring for this patient on the day of the visit/encounter. >30 minutes of time was spent on: Diagnostic results, Risks and benefits of tx options, Instructions for management, Documenting in the medical record, Reviewing / ordering tests, medicine, procedures  , and Communicating with other healthcare professionals .    **Please Note: This note may have been constructed using a voice recognition system**

## 2024-10-27 NOTE — ASSESSMENT & PLAN NOTE
Lab Results   Component Value Date    HGBA1C 8.3 (A) 08/09/2024       Recent Labs     10/26/24  1527 10/26/24  2118 10/27/24  0654 10/27/24  1112   POCGLU 194* 169* 170* 261*       Home regimen: Tresiba 10 units daily.  Metformin 500 mg BID  Continue at discharge

## 2024-10-27 NOTE — PROGRESS NOTES
Progress Note - Gastroenterology   Name: Amanda Benavidez 64 y.o. female I MRN: [id number]  Unit/Bed#: S -01 I Date of Admission: 10/25/2024   Date of Service: 10/27/2024 I Hospital Day: 2    Assessment & Plan  Acute blood loss anemia  Appears to have been secondary to anastomotic ulcer, associated with gastric bypass.  EGD yesterday noted clean-based ulcer requiring no specific hemostatic intervention.  No evidence of active GI bleeding currently    -May continue with aspirin and Plavix, avoid NSAIDs otherwise    -Monitor hemoglobin, transfuse if needed    -Continue PPI therapy twice daily for the meantime until repeat EGD in 2 to 3 months, will contact our office staff to schedule    -Nonulcerogenic diet as tolerated        Subjective   Patient reports feeling well, has not had any bowel movements overnight or this morning, denies any abdominal pain, nausea or vomiting    Objective :  Temp:  [97.6 °F (36.4 °C)-98.5 °F (36.9 °C)] 97.6 °F (36.4 °C)  HR:  [68-78] 70  BP: (101-128)/(59-70) 128/70  Resp:  [17-19] 19  SpO2:  [93 %-99 %] 99 %    Physical Exam  Constitutional:       General: She is not in acute distress.     Appearance: She is well-developed. She is not diaphoretic.   HENT:      Head: Normocephalic and atraumatic.   Eyes:      Conjunctiva/sclera: Conjunctivae normal.      Pupils: Pupils are equal, round, and reactive to light.   Cardiovascular:      Rate and Rhythm: Normal rate and regular rhythm.      Heart sounds: Normal heart sounds. No murmur heard.     No friction rub. No gallop.   Pulmonary:      Effort: Pulmonary effort is normal. No respiratory distress.      Breath sounds: Normal breath sounds. No stridor. No wheezing or rales.   Abdominal:      General: Bowel sounds are normal. There is no distension.      Palpations: Abdomen is soft. There is no mass.      Tenderness: There is no abdominal tenderness. There is no guarding or rebound.   Musculoskeletal:         General: No tenderness. Normal  range of motion.      Cervical back: Normal range of motion and neck supple.   Skin:     General: Skin is warm and dry.      Coloration: Skin is not pale.      Findings: No erythema or rash.   Neurological:      Mental Status: She is alert and oriented to person, place, and time.   Psychiatric:         Behavior: Behavior normal.           Lab Results: I have reviewed the following results:CBC/BMP:   .     10/27/24  0428 10/27/24  0839   HGB  --  9.0*   HCT  --  27.8*   SODIUM 139  --    K 5.2  --      --    CO2 29  --    BUN 28*  --    CREATININE 1.15  --    GLUC 187*  --    MG 1.9  --

## 2024-10-27 NOTE — ASSESSMENT & PLAN NOTE
Assessment:  Presented with lightheadedness and fatigue for the past 3 days.  Patient was seen at a urgent care on Tuesday where they diagnosed her with ear fullness and prescribed her prednisone for 5 days however she did not notice any improvement and presented to [facility] ED.  Reports melena that started on Tuesday.   No NSAIDs use.  Hemoglobin 4.2  Elevated BUN 64  Baseline hemoglobin (9-11). Hgb was normal prior to 4/2023  Hemodynamically stable    Hemoglobin   Date Value Ref Range Status   10/27/2024 9.0 (L) 11.5 - 15.4 g/dL Final   08/01/2015 11.9 11.5 - 15.4 g/dL Final     Last colonoscopy in 2013 was normal with no polyps.  Normal Cologuard on 9/26  In the ED, received Protonix 80 mg IV.    Plan:  PRBCs transfusions  GI conducted EGD  Deep cratered ulcer at gastrojejunal anastomosis with clean base in the body of stomach, without bleeding identified, friable mucosa.  Second ulcer seen in the same region.  Normal esophagus and duodenum.  For liquid diet advance as tolerated  Resumed aspirin and Plavix s/p EGD  Protonix twice daily  CBC in 1 week 9 EGD in 2 to 3 months per GI recommendations

## 2024-10-27 NOTE — PLAN OF CARE
Problem: HEMATOLOGIC - ADULT  Goal: Maintains hematologic stability  Description: INTERVENTIONS  - Assess for signs and symptoms of bleeding or hemorrhage  - Monitor labs  - Administer supportive blood products/factors as ordered and appropriate  Outcome: Progressing     Problem: DISCHARGE PLANNING  Goal: Discharge to home or other facility with appropriate resources  Description: INTERVENTIONS:  - Identify barriers to discharge w/patient and caregiver  - Arrange for needed discharge resources and transportation as appropriate  - Identify discharge learning needs (meds, wound care, etc.)  - Arrange for interpretive services to assist at discharge as needed  - Refer to Case Management Department for coordinating discharge planning if the patient needs post-hospital services based on physician/advanced practitioner order or complex needs related to functional status, cognitive ability, or social support system  Outcome: Progressing

## 2024-10-27 NOTE — DISCHARGE INSTR - AVS FIRST PAGE
Dear Amanda Benavidez,     It was our pleasure to care for you here at [facility].  It is our hope that we were always able to exceed the expected standards for your care during your stay.  You were hospitalized due to Anemia.  You were cared for on the 4th floor by Sissy Duncan MD under the service of Gila aL, * with the [facility] Hospitalist Group who covers for your primary care physician (PCP), Laure Thomas DO, while you were hospitalized.  If you have any questions or concerns related to this hospitalization, you may contact us at .  For follow up as well as any medication refills, we recommend that you follow up with your primary care physician.  A registered nurse will reach out to you by phone within a few days after your discharge to answer any additional questions that you may have after going home.  However, at this time we provide for you here, the most important instructions / recommendations at discharge:     Notable Medication Adjustments -   Please continue to hold Prinzide at home until you are able to follow up with your PCP   Can continue Toprol  Please start taking Protonix 40 Mg twice a day before meals  Please start taking Carafate 1 tab every 6 hours  Please continue your diabetes regimen at home with your usual diet  Testing Required after Discharge -   EGD in 2 to 3 months  CBC in 1 week   ** Please contact your PCP to request testing orders for any of the testing recommended here **  Important follow up information -   Please follow-up with your PCP in 1 week  Please follow-up with gastroenterology  Other Instructions -   If you are experiencing any fevers, abdominal pain, nausea, vomiting, diarrhea, black/tarry stools, bloody vomitus, severe fatigue, shortness of breath please return to the ED immediately  Please review this entire after visit summary as additional general instructions including medication list,  appointments, activity, diet, any pertinent wound care, and other additional recommendations from your care team that may be provided for you.      Sincerely,     Sissy Duncan MD

## 2024-10-27 NOTE — ASSESSMENT & PLAN NOTE
Appears to have been secondary to anastomotic ulcer, associated with gastric bypass.  EGD yesterday noted clean-based ulcer requiring no specific hemostatic intervention.  No evidence of active GI bleeding currently    -May continue with aspirin and Plavix, avoid NSAIDs otherwise    -Monitor hemoglobin, transfuse if needed    -Continue PPI therapy twice daily for the meantime until repeat EGD in 2 to 3 months, will contact our office staff to schedule    -Nonulcerogenic diet as tolerated

## 2024-10-28 ENCOUNTER — TELEPHONE (OUTPATIENT)
Dept: GASTROENTEROLOGY | Facility: CLINIC | Age: 64
End: 2024-10-28

## 2024-10-28 ENCOUNTER — PREP FOR PROCEDURE (OUTPATIENT)
Dept: GASTROENTEROLOGY | Facility: CLINIC | Age: 64
End: 2024-10-28

## 2024-10-28 ENCOUNTER — TRANSITIONAL CARE MANAGEMENT (OUTPATIENT)
Dept: FAMILY MEDICINE CLINIC | Facility: MEDICAL CENTER | Age: 64
End: 2024-10-28

## 2024-10-28 ENCOUNTER — TELEPHONE (OUTPATIENT)
Dept: FAMILY MEDICINE CLINIC | Facility: MEDICAL CENTER | Age: 64
End: 2024-10-28

## 2024-10-28 DIAGNOSIS — K25.0 ACUTE GASTRIC ULCER WITH HEMORRHAGE: Primary | ICD-10-CM

## 2024-10-28 DIAGNOSIS — R73.9 HYPERGLYCEMIA: ICD-10-CM

## 2024-10-28 DIAGNOSIS — E11.65 TYPE 2 DIABETES MELLITUS WITH HYPERGLYCEMIA, WITHOUT LONG-TERM CURRENT USE OF INSULIN (HCC): ICD-10-CM

## 2024-10-28 RX ORDER — BLOOD-GLUCOSE METER
KIT MISCELLANEOUS
Qty: 300 STRIP | Refills: 1 | Status: SHIPPED | OUTPATIENT
Start: 2024-10-28

## 2024-10-28 RX ORDER — LANCETS 28 GAUGE
EACH MISCELLANEOUS
Qty: 300 EACH | Refills: 1 | Status: SHIPPED | OUTPATIENT
Start: 2024-10-28

## 2024-10-28 NOTE — TELEPHONE ENCOUNTER
----- Message from Luther Chowdhury PA-C sent at 10/27/2024  1:28 PM EDT -----  Patient is being discharged from [facility], please arrange for outpatient EGD in 2 to 3 months, she was seen with Dr. Cunha, follow-up on anastomotic ulcer.  Thank you.

## 2024-10-28 NOTE — TELEPHONE ENCOUNTER
Patient called to request a refill for their BD Pen Needle Trudy 2nd Gen. Advised a refill was requested on October 28, 2024  and is pending approval. Patient verbalized understanding and is in agreement.

## 2024-10-28 NOTE — TELEPHONE ENCOUNTER
----- Message from Osmel Cunha MD sent at 10/27/2024  6:12 PM EDT -----  Please schedule patient for EGD with me in the next 2 to 3 months.

## 2024-10-28 NOTE — TELEPHONE ENCOUNTER
Pt has tcm appt sched with you on 11/5.  Pt stated she is to have a cbc this week per her dc instructions.  Can you please order.

## 2024-10-28 NOTE — TELEPHONE ENCOUNTER
Call pt to schedule a repeat EGD with Dr. Cunha at [facility] end of January. Order is in chart, Pt on Plavix from Dr. Do. She is also diabetic. Will call to schedule.

## 2024-10-28 NOTE — TELEPHONE ENCOUNTER
Procedure: EGD  Scheduled date of procedure (as of today):01/31/25  Physician performing procedure:Dr. Cunha  [facility]  Instructions reviewed with patient by: ti  Clearances: Plavix clearance 5 day hold, will request 1 month prior to her procedure from Dr. Fisher

## 2024-10-28 NOTE — TELEPHONE ENCOUNTER
Reason for call:   [x] Refill   [] Prior Auth  [] Other:     Office:   [] PCP/Provider -   [x] Specialty/Provider - [facility] / Rich Saab,      Medication:   glucose blood (FREESTYLE LITE) test strip    Use as instructed     300 strips + 1 refill    Lancets (freestyle) lancets    Use as instructed     300 strips + 1 refill    Pharmacy: [id number] - [place] - [address]     Does the patient have enough for 3 days?   [x] Yes   [] No - Send as HP to POD

## 2024-10-29 DIAGNOSIS — D62 ACUTE BLOOD LOSS ANEMIA: Primary | ICD-10-CM

## 2024-10-29 RX ORDER — PEN NEEDLE, DIABETIC 32GX 5/32"
NEEDLE, DISPOSABLE MISCELLANEOUS
Qty: 100 EACH | Refills: 1 | Status: SHIPPED | OUTPATIENT
Start: 2024-10-29

## 2024-10-29 NOTE — UTILIZATION REVIEW
NOTIFICATION OF ADMISSION DISCHARGE   This is a Notification of Discharge from [facility]. Please be advised that this patient has been discharge from our facility. Below you will find the admission and discharge date and time including the patient’s disposition.   UTILIZATION REVIEW CONTACT:  Massiel Suggs  Utilization   Network Utilization Review Department  Phone: [phone] x carefully listen to the prompts. All voicemails are confidential.  Email: [email or web address]     ADMISSION INFORMATION  PRESENTATION DATE: 10/25/2024  7:48 AM  OBERVATION ADMISSION DATE: N/A  INPATIENT ADMISSION DATE: 10/25/24 10:30 AM   DISCHARGE DATE: 10/27/2024  2:41 PM   DISPOSITION:Home/Self Care    Network Utilization Review Department  ATTENTION: Please call with any questions or concerns to [phone] and carefully listen to the prompts so that you are directed to the right person. All voicemails are confidential.   For Discharge needs, contact Care Management DC Support Team at [phone] opt. 2  Send all requests for admission clinical reviews, approved or denied determinations and any other requests to dedicated fax number below belonging to the campus where the patient is receiving treatment. List of dedicated fax numbers for the Facilities:  FACILITY NAME UR FAX NUMBER   ADMISSION DENIALS (Administrative/Medical Necessity) [phone]   DISCHARGE SUPPORT TEAM ([facility]) [phone]   PARENT CHILD HEALTH (Maternity/NICU/Pediatrics) [phone]   [facility] [phone]   [facility] [phone]   [facility] [phone]   [facility] [phone]   [facility] [phone]   [facility] [phone]   [facility] [phone]   [facility] [phone]    [facility] [phone]   [facility] [phone]   [facility] [phone]   [facility] [phone]

## 2024-10-30 ENCOUNTER — TELEPHONE (OUTPATIENT)
Age: 64
End: 2024-10-30

## 2024-10-30 DIAGNOSIS — D62 ACUTE BLOOD LOSS ANEMIA: Primary | ICD-10-CM

## 2024-10-30 NOTE — TELEPHONE ENCOUNTER
Patient is asking if provider would add an iron tet to her labs.  She stated when she was in the hospital they talked about checking her iron but never did.  Asking if the order can be placed ASAP, she plans on going for blood work in the morning.

## 2024-11-01 ENCOUNTER — APPOINTMENT (OUTPATIENT)
Dept: LAB | Facility: MEDICAL CENTER | Age: 64
End: 2024-11-01
Payer: COMMERCIAL

## 2024-11-01 DIAGNOSIS — Z98.84 H/O GASTRIC BYPASS: ICD-10-CM

## 2024-11-01 DIAGNOSIS — Z83.2 FAMILY HISTORY OF THALASSEMIA: ICD-10-CM

## 2024-11-01 DIAGNOSIS — E11.65 TYPE 2 DIABETES MELLITUS WITH HYPERGLYCEMIA, WITHOUT LONG-TERM CURRENT USE OF INSULIN (HCC): ICD-10-CM

## 2024-11-01 DIAGNOSIS — E61.1 IRON DEFICIENCY: ICD-10-CM

## 2024-11-01 DIAGNOSIS — D62 ACUTE BLOOD LOSS ANEMIA: ICD-10-CM

## 2024-11-01 DIAGNOSIS — D64.9 ANEMIA, UNSPECIFIED TYPE: ICD-10-CM

## 2024-11-01 LAB
BASOPHILS # BLD AUTO: 0.04 THOUSANDS/ΜL (ref 0–0.1)
BASOPHILS NFR BLD AUTO: 1 % (ref 0–1)
CREAT UR-MCNC: 87.4 MG/DL
EOSINOPHIL # BLD AUTO: 0.21 THOUSAND/ΜL (ref 0–0.61)
EOSINOPHIL NFR BLD AUTO: 3 % (ref 0–6)
ERYTHROCYTE [DISTWIDTH] IN BLOOD BY AUTOMATED COUNT: 14.8 % (ref 11.6–15.1)
FERRITIN SERPL-MCNC: 12 NG/ML (ref 11–307)
FOLATE SERPL-MCNC: 4.4 NG/ML
HCT VFR BLD AUTO: 26.8 % (ref 34.8–46.1)
HGB BLD-MCNC: 8.2 G/DL (ref 11.5–15.4)
IMM GRANULOCYTES # BLD AUTO: 0.02 THOUSAND/UL (ref 0–0.2)
IMM GRANULOCYTES NFR BLD AUTO: 0 % (ref 0–2)
IRON SATN MFR SERPL: 12 % (ref 15–50)
IRON SERPL-MCNC: 49 UG/DL (ref 50–212)
LYMPHOCYTES # BLD AUTO: 2.14 THOUSANDS/ΜL (ref 0.6–4.47)
LYMPHOCYTES NFR BLD AUTO: 33 % (ref 14–44)
MCH RBC QN AUTO: 28.7 PG (ref 26.8–34.3)
MCHC RBC AUTO-ENTMCNC: 30.6 G/DL (ref 31.4–37.4)
MCV RBC AUTO: 94 FL (ref 82–98)
MICROALBUMIN UR-MCNC: <7 MG/L
MONOCYTES # BLD AUTO: 0.62 THOUSAND/ΜL (ref 0.17–1.22)
MONOCYTES NFR BLD AUTO: 10 % (ref 4–12)
NEUTROPHILS # BLD AUTO: 3.42 THOUSANDS/ΜL (ref 1.85–7.62)
NEUTS SEG NFR BLD AUTO: 53 % (ref 43–75)
NRBC BLD AUTO-RTO: 0 /100 WBCS
PLATELET # BLD AUTO: 415 THOUSANDS/UL (ref 149–390)
PMV BLD AUTO: 9.9 FL (ref 8.9–12.7)
RBC # BLD AUTO: 2.86 MILLION/UL (ref 3.81–5.12)
TIBC SERPL-MCNC: 393 UG/DL (ref 250–450)
UIBC SERPL-MCNC: 344 UG/DL (ref 155–355)
VIT B12 SERPL-MCNC: 1432 PG/ML (ref 180–914)
WBC # BLD AUTO: 6.45 THOUSAND/UL (ref 4.31–10.16)

## 2024-11-01 PROCEDURE — 83550 IRON BINDING TEST: CPT

## 2024-11-01 PROCEDURE — 82728 ASSAY OF FERRITIN: CPT

## 2024-11-01 PROCEDURE — 85025 COMPLETE CBC W/AUTO DIFF WBC: CPT

## 2024-11-01 PROCEDURE — 83540 ASSAY OF IRON: CPT

## 2024-11-01 PROCEDURE — 36415 COLL VENOUS BLD VENIPUNCTURE: CPT

## 2024-11-01 PROCEDURE — 82985 ASSAY OF GLYCATED PROTEIN: CPT

## 2024-11-01 PROCEDURE — 82746 ASSAY OF FOLIC ACID SERUM: CPT

## 2024-11-01 PROCEDURE — 82607 VITAMIN B-12: CPT

## 2024-11-02 LAB — FRUCTOSAMINE SERPL-SCNC: 282 UMOL/L (ref 0–285)

## 2024-11-04 ENCOUNTER — TELEPHONE (OUTPATIENT)
Dept: ADMINISTRATIVE | Facility: OTHER | Age: 64
End: 2024-11-04

## 2024-11-04 ENCOUNTER — TELEPHONE (OUTPATIENT)
Dept: CARDIOLOGY CLINIC | Facility: CLINIC | Age: 64
End: 2024-11-04

## 2024-11-04 NOTE — TELEPHONE ENCOUNTER
----- Message from Mari CROFT sent at 11/4/2024 11:26 AM EST -----  Regarding: Care Gap Request  11/04/24 11:26 AM    Hello, our patient above has had CRC: Cologuard completed/performed. Please assist in updating the patient chart by pulling the Care Everywhere (CE) document. The date of service is 9/26/2024.     Thank you,  Mari Prajapati MA  [facility]

## 2024-11-04 NOTE — TELEPHONE ENCOUNTER
Caller: Amanda     Doctor: Dr. Staton     Reason for call: Patient is calling to inform Dr. Fisher that she was recently in the HOSP and her Hemoglobin was at a 4 and the doctors mentioned this could be due to the combination of the baby Aspirin and Plavix. Patient had a blood transfusion and a endoscopy done and that came back abnormal. The Hosp had her stop taking the lisinopril-hydrochlorothiazide (PRINZIDE,ZESTORETIC) 10-12.5 MG per tablet [[id number] so she wants alyssa garcía aware of that as well. Since he manages that for her.     I was able to schedule her with Lynnette Murray 11/11/24 in [facility]. She agreed but wanted Dr. Fisher aware of everything she told me over the phone prior to her appt.     Call back#: [phone]

## 2024-11-05 ENCOUNTER — OFFICE VISIT (OUTPATIENT)
Dept: FAMILY MEDICINE CLINIC | Facility: MEDICAL CENTER | Age: 64
End: 2024-11-05
Payer: COMMERCIAL

## 2024-11-05 VITALS
DIASTOLIC BLOOD PRESSURE: 76 MMHG | WEIGHT: 179.9 LBS | HEART RATE: 72 BPM | SYSTOLIC BLOOD PRESSURE: 120 MMHG | BODY MASS INDEX: 27.26 KG/M2 | RESPIRATION RATE: 18 BRPM | HEIGHT: 68 IN | OXYGEN SATURATION: 95 % | TEMPERATURE: 98.8 F

## 2024-11-05 DIAGNOSIS — Z76.89 ENCOUNTER FOR SUPPORT AND COORDINATION OF TRANSITION OF CARE: Primary | ICD-10-CM

## 2024-11-05 DIAGNOSIS — I10 BENIGN ESSENTIAL HYPERTENSION: ICD-10-CM

## 2024-11-05 DIAGNOSIS — D62 ACUTE BLOOD LOSS ANEMIA: ICD-10-CM

## 2024-11-05 DIAGNOSIS — I25.10 CORONARY ARTERY DISEASE INVOLVING NATIVE CORONARY ARTERY OF NATIVE HEART WITHOUT ANGINA PECTORIS: ICD-10-CM

## 2024-11-05 PROCEDURE — 99495 TRANSJ CARE MGMT MOD F2F 14D: CPT | Performed by: STUDENT IN AN ORGANIZED HEALTH CARE EDUCATION/TRAINING PROGRAM

## 2024-11-05 NOTE — TELEPHONE ENCOUNTER
Thank you, I reviewed her chart.  Seems like EGD revealed an ulcer causing upper GI bleeding.  She does not require clopidogrel any longer and should not be on it  She can hold off on aspirin for now if it has not been restarted, until she has a follow-up EGD  But she should eventually resume aspirin if she has not already  Okay to stay off her lisinopril/HCTZ for now, but if she has a home blood pressure cuff she should be checking it, with improvement in her hemoglobin, her blood pressure is likely to escalate again, if it goes over 140/90 at home she should resume it  Okay for her to see the advanced practitioner, please assure her she is in good hands

## 2024-11-05 NOTE — PROGRESS NOTES
Critical access hospital - Clinic Note  Laure Thomas, DO, 24     Amanda Benavidez MRN: [id number] : 1960 Age: 64 y.o.     Assessment/Plan     1. Encounter for support and coordination of transition of care    -Recent hospitalization for acute blood loss anemia    2. Acute blood loss anemia    -Repeat CBC with stable hemoglobin  -Repeat CBC in 4 weeks and sooner as needed  -Repeat EGD scheduled for 2025  -Patient aware of red flag signs and symptoms such as lightheadedness, shortness of breath, hematochezia, melena, abdominal pain in which case to seek prompt medical attention  - CBC (Includes Diff/Plt) (Refl); Future  - I have reviewed pertinent labs:  CBC:   Lab Results   Component Value Date    WBC 6.45 2024    RBC 2.86 (L) 2024    HGB 8.2 (L) 2024    HCT 26.8 (L) 2024    MCV 94 2024     (H) 2024    MCH 28.7 2024    MCHC 30.6 (L) 2024    RDW 14.8 2024    MPV 9.9 2024    NEUTROABS 3.42 2024     Iron Study   Lab Results   Component Value Date    FERRITIN 12 2024    CONCFE 12 (L) 2024    TIBC 393 2024    IRON 49 (L) 2024     10/26/2024 EGD:  Deep cratered ulcer in the jejunal side of the gastrojejunal anastomosis with a clean base, no stigmata.  Friable mucosa, second ulcer was also seen in the gastrojejunal anastomosis which was also clean-based.  Clean-based ulcer within the gastric body just proximal to the anastomosis as well was identified  Normal esophagus  Normal small bowel     -Continue Carafate and Protonix    3. Coronary artery disease involving native coronary artery of native heart without angina pectoris    -Patient resumed Plavix and aspirin upon discharge    4. Benign essential hypertension    -Home BP log reviewed  -Continue Toprol-XL 25 mg p.o. daily  -Continue to hold lisinopril-hydrochlorothiazide at this time as blood pressures appropriate  -Patient aware of parameters in which  case to contact    Amanda Benavidez acknowledged understanding of treatment plan, all questions answered.    Subjective      Amanda Benavidez is a 64 y.o. female who presents for transition of care appointment.  She presents today with her .  Patient was hospitalized at [facility] from October 25, 2024 to October 27, 2024 for acute blood loss anemia.  Hemoglobin was as low as 4.2.  She received 3 units PRBC.  She underwent EGD which revealed ulcerations.  Today, patient denies any lightheadedness.  She mentions bowel movements are brown and formed.    Hospital course per discharge summary:  Amanda Benavidez is a 64 y.o. female patient who originally presented to the hospital on 10/25/2024 due to anemia, dark stool, lightheadedness     Hospital Course: MHx of  diabetes type 2,CAD s/p LAD stent 6/2023, hypertension, Tramaine-en-Y bypass who presents with lightheadedness.  Patient reports lightheadedness for the past 3 days.  he was seen at urgent care on 10/22 where she was diagnosed with otitis media and prescribed prednisone for 5 days.  However, she did not notice any improvement and continued to feel lightheaded. She also experienced fatigue and generalized weakness. Reports that she had melena for the past 3 days.  No NSAIDs use. No prior smoking history. No alcohol. No extensive caffeine use.  Denies any chest pain, shortness of breath, or palpitations.  No hematuria.  No headache, numbness, or tingling.  No history of iron tablet use. Denies any weight loss, night sweats, appetite change, or chills.     TCM Call       Date and time call was made  10/28/2024 11:24 AM    Hospital care reviewed  Records reviewed    Patient was hospitialized at  [facility]    Date of Admission  10/25/24    Date of discharge  10/27/24    Diagnosis  Acute blood loss anemia    Disposition  Home    Were the patients medications reviewed and updated  Yes    Current Symptoms  None          TCM Call       Post  hospital issues  None    Scheduled for follow up?  Yes    Patients specialists  Cardiologist; Endocrinologist    Cardiologist name   Cardiology    Did you obtain your prescribed medications  Yes    Do you need help managing your prescriptions or medications  No    Is transportation to your appointment needed  No    I have advised the patient to call PCP with any new or worsening symptoms  Danielle Cordoba    Living Arrangements  Spouse or Significiant other    Are you recieving any outpatient services  No    Are you recieving home care services  No    Are you using any community resources  No    Current waiver services  No    Have you fallen in the last 12 months  No    Interperter language line needed  No    Counseling  Patient    Counseling topics  Importance of RX compliance              The following portions of the patient's history were reviewed and updated as appropriate: allergies, current medications, past family history, past medical history, past social history, past surgical history and problem list.     Past Medical History:   Diagnosis Date    Diabetes mellitus (HCC) 2023    Hypertension     Obesity     Had weight trell surgery       Allergies   Allergen Reactions    Nsaids Other (See Comments)     Weight loss surgery advise to not take        Past Surgical History:   Procedure Laterality Date    BARIATRIC SURGERY      CARDIAC CATHETERIZATION N/A 2023    Procedure: Cardiac Coronary Angiogram;  Surgeon: Syed Baltazar DO;  Location: [facility];  Service: Cardiology    CARDIAC CATHETERIZATION N/A 2023    Procedure: Cardiac pci;  Surgeon: Syed Baltazar DO;  Location: [facility];  Service: Cardiology     SECTION      32 years ago (2019)       Family History   Problem Relation Age of Onset    Breast cancer Mother 50    Arrhythmia Mother         atrial flutter    Skin cancer Mother     Hypertension Father     Hyperlipidemia Father     Breast cancer  Sister 40    Atrial fibrillation Sister     No Known Problems Sister     No Known Problems Daughter     Breast cancer Maternal Grandmother 50    Throat cancer Maternal Grandfather     No Known Problems Paternal Grandmother     No Known Problems Paternal Grandfather     Atrial fibrillation Brother     Other Brother         sarosis of liver    No Known Problems Brother     No Known Problems Brother     No Known Problems Brother     No Known Problems Brother     No Known Problems Brother     No Known Problems Brother     Heart attack Paternal Uncle     Breast cancer Maternal Aunt 50    No Known Problems Maternal Aunt     No Known Problems Paternal Aunt     No Known Problems Paternal Aunt     No Known Problems Paternal Aunt     No Known Problems Paternal Aunt     Stroke Neg Hx     Anuerysm Neg Hx     Clotting disorder Neg Hx     Heart failure Neg Hx     Coronary artery disease Neg Hx        Social History     Socioeconomic History    Marital status: /Civil Union     Spouse name: None    Number of children: None    Years of education: None    Highest education level: None   Occupational History    None   Tobacco Use    Smoking status: Former     Current packs/day: 0.00     Average packs/day: 1 pack/day for 20.0 years (20.0 ttl pk-yrs)     Types: Cigarettes     Start date:      Quit date:      Years since quittin.8    Smokeless tobacco: Never    Tobacco comments:     20 years ago.   Vaping Use    Vaping status: Never Used   Substance and Sexual Activity    Alcohol use: Never    Drug use: Never    Sexual activity: Not Currently     Partners: Male     Birth control/protection: Post-menopausal   Other Topics Concern    None   Social History Narrative    None     Social Determinants of Health     Financial Resource Strain: Not on file   Food Insecurity: No Food Insecurity (10/25/2024)    Nursing - Inadequate Food Risk Classification     Worried About Running Out of Food in the Last Year: Not on file      Ran Out of Food in the Last Year: Not on file     Ran Out of Food in the Last Year: 1   Transportation Needs: No Transportation Needs (10/25/2024)    Nursing - Transportation Risk Classification     Lack of Transportation: Not on file     Lack of Transportation: 2   Physical Activity: Not on file   Stress: Not on file (2024)   Social Connections: Not on file   Intimate Partner Violence: Unknown (10/25/2024)    Nursing IPS     Feels Physically and Emotionally Safe: Not on file     Physically Hurt by Someone: Not on file     Humiliated or Emotionally Abused by Someone: Not on file     Physically Hurt by Someone: 2     Hurt or Threatened by Someone: 2   Housing Stability: Unknown (10/25/2024)    Nursing: Inadequate Housing Risk Classification     Has Housing: Not on file     Worried About Losing Housing: Not on file     Unable to Get Utilities: Not on file     Unable to Pay for Housing in the Last Year: 2     Has Housin       Current Outpatient Medications   Medication Sig Dispense Refill    aspirin 81 mg chewable tablet CHEW AND SWALLOW ONE TABLET BY MOUTH ONCE DAILY 90 tablet 0    atorvastatin (LIPITOR) 40 mg tablet TAKE 1 TABLET BY MOUTH EVERY DAY 90 tablet 1    calcium citrate (CALCITRATE) 950 MG tablet Take 1 tablet by mouth daily      cholecalciferol (VITAMIN D3) 1,000 units tablet Take 1,000 Units by mouth daily      clopidogrel (PLAVIX) 75 mg tablet Take 1 tablet (75 mg total) by mouth daily 90 tablet 0    glucose blood (FREESTYLE LITE) test strip Use as instructed 300 strip 1    insulin degludec (Tresiba FlexTouch) 100 units/mL injection pen INJECT 10 UNITS UNDER THE SKIN DAILY 15 mL 1    Insulin Pen Needle (BD Pen Needle Trudy U/F) 32G X 4 MM MISC FOR USE WITH INSULIN  each 1    Lancets (freestyle) lancets Use as instructed 300 each 1    lisinopril-hydrochlorothiazide (PRINZIDE,ZESTORETIC) 10-12.5 MG per tablet Take 1 tablet by mouth daily 90 tablet 6    metFORMIN (GLUCOPHAGE) 500 mg tablet  "TAKE 1 TABLET BY MOUTH TWICE A  tablet 1    metoprolol succinate (TOPROL-XL) 25 mg 24 hr tablet Take 1 tablet (25 mg total) by mouth daily 90 tablet 3    multivitamin (THERAGRAN) TABS Take 1 tablet by mouth daily      pantoprazole (PROTONIX) 40 mg tablet Take 1 tablet (40 mg total) by mouth 2 (two) times a day before meals 60 tablet 0    sucralfate (CARAFATE) 1 g tablet Take 1 tablet (1 g total) by mouth every 6 (six) hours 120 tablet 0    cyanocobalamin 100 MCG tablet Take 100 mcg by mouth daily (Patient not taking: Reported on 10/25/2024)       No current facility-administered medications for this visit.       Review of Systems     As noted in HPI    Objective      /76 (BP Location: Left arm, Patient Position: Sitting, Cuff Size: Standard)   Pulse 72   Temp 98.8 °F (37.1 °C) (Temporal)   Resp 18   Ht 5' 8\" (1.727 m)   Wt 81.6 kg (179 lb 14.4 oz)   SpO2 95%   BMI 27.35 kg/m²     Physical Exam  Vitals reviewed.   Constitutional:       General: She is not in acute distress.     Appearance: Normal appearance.   HENT:      Head: Normocephalic and atraumatic.      Mouth/Throat:      Mouth: Mucous membranes are moist.      Pharynx: Oropharynx is clear.   Eyes:      Extraocular Movements: Extraocular movements intact.      Conjunctiva/sclera: Conjunctivae normal.      Pupils: Pupils are equal, round, and reactive to light.   Cardiovascular:      Rate and Rhythm: Normal rate and regular rhythm.      Pulses: Normal pulses.      Heart sounds: Normal heart sounds.   Pulmonary:      Effort: Pulmonary effort is normal.      Breath sounds: Normal breath sounds.   Abdominal:      General: Abdomen is flat. Bowel sounds are normal.      Palpations: Abdomen is soft.      Tenderness: There is no abdominal tenderness. There is no guarding or rebound.   Musculoskeletal:      Right lower leg: No edema.      Left lower leg: No edema.   Skin:     General: Skin is warm and dry.      Capillary Refill: Capillary refill " "takes less than 2 seconds.   Neurological:      Mental Status: She is alert and oriented to person, place, and time.   Psychiatric:         Mood and Affect: Mood normal.         Behavior: Behavior normal.         Thought Content: Thought content normal.             Some portions of this record may have been generated with voice recognition software. There may be translation, syntax, or grammatical errors. Occasional wrong word or \"sound-a-like\" substitutions may have occurred due to the inherent limitations of the voice recognition software. Read the chart carefully and recognize, using context, where substations may have occurred. If you have any questions, please contact the dictating provider for clarification or correction, as needed.  "

## 2024-11-07 NOTE — TELEPHONE ENCOUNTER
Upon review of the In Basket request we were able to locate, review, and update the patient chart as requested for CRC: Saritha.    Any additional questions or concerns should be emailed to the Practice Liaisons via the appropriate education email address, please do not reply via In Basket.    Thank you  Estefani Quiroga MA   [facility]

## 2024-11-08 NOTE — PROGRESS NOTES
Advanced Heart Failure / Pulmonary Hypertension Outpatient Visit    Amanda Benavidez 64 y.o. female   MRN: [id number]  Encounter: [id number]    Assessment:  Patient Active Problem List    Diagnosis Date Noted    Type 2 diabetes mellitus with albuminuria  ([facility]) 11/11/2024    Acute blood loss anemia 10/25/2024    Iron deficiency 12/12/2023    Vitamin D deficiency 12/12/2023    Coronary artery disease involving native coronary artery 06/05/2023    Status post insertion of drug eluting coronary artery stent 06/05/2023    Type 2 diabetes mellitus with hyperglycemia, without long-term current use of insulin ([facility]) 04/15/2023    Dystrophic nail 06/07/2017    Psoriasis 06/07/2017    Insect bite 06/22/2015    Postgastrectomy malabsorption 01/09/2015    Benign essential hypertension 07/22/2014    Arthritis 07/22/2014    Cellulitis of left lower leg 07/21/2014    Hematochezia 03/05/2013    Atrial fibrillation ([facility]) 12/13/2012    Hyperlipidemia 07/11/2012    BMI 28.0-28.9,adult 07/11/2012       Today's Plan:  Con'tinue Toprol XL.  BP's stable 110-120s here and at home  (continue to hold lisinopril/HCTZ)  Patient understands to call office if BP's start to increase, or LE swelling occurs  Do not take Plavix.  Patient continued ASA --will repeat blood work 3-4 weeks to evaluate Hct/HGB  F/U with GI for EGD in Jan 2025  RTO  with Dr. Fisher 02/10/2024 after EGD complete    Plan:  Acute Blood loss anemia   Presented 10/25/2024 with melena, lightheadedness--HGB found to be 4.2  PRBCs transfusions x 3 units. Hgb at Discharge 10/27: 9.0,   Repeat 11/1: 8.2  GI conducted EGD  Deep cratered ulcer at gastrojejunal anastomosis with clean base in the body of stomach, without bleeding identified, friable mucosa.  Second ulcer seen in the same region.  Normal esophagus and duodenum.  Resumed aspirin and Plavix s/p EGD (Plavix dc'd by Dr Fisher post d/c, ASA placed on hold)  Protonix twice daily  Repeat EGD in January, per GI  recommendations    HTN   Currently on Toprol XL   BP's at home: 110-120's    BP's here: 114/72, HR 70s   Prinzide, Zestoretic on hold in setting of anemia    CAD   REBECA to LAD in 2023   Plavix stopped this month dt GIB   Patient never stopped ASA--will hold if repeat CBC abnormal    Atrial Fibrillation  Remote history of PAF in the setting of obesity, without any recurrence   NSR in office today    HPI:   Amanda Benavidez is a 64-year-old female with a PMH of PAF, obesity, s/p bariatric surgery, HTN, HLD, uncontrolled DM2, CAD with REBECA to LAD 2023 who presents to the office for post hospital f/u. Follows with Dr. Fisher.     03/13/2024 Dr. Fisher: She has continued atypical, noncardiac description, nonradiating, nonexertional left-sided chest discomfort that can last an entire day and recently improved with Tylenol.  Her EKG today is normal, she had similar symptoms 2 months ago at the time of uncontrolled hypertension, EKG at that time was normal. Blood pressure improved nicely with the addition of lisinopril/HCTZ, and because of her lower body weight and excellent weight loss, she only required a dose of 10/12.5 mg, previously on 20/25 mg.  Follow-up blood work was normal. Lipids remain well-controlled. She has updated endocrine/diabetic testing this week, and then plan follow-up with Dr. Saab.     10/25/2024--10/27/2025 Hospital Admit: Presented with melena and lightheadedness and was noted to have hemoglobin of 4.2. She received 3 units of blood transfusion and hemoglobin now improved to 9 prior to discharge. GI evaluated, she is now status post EGD which showed nonbleeding anastomotic ulcer. Aspirin and Plavix were restarted on 10/26/2024 after GI clearance.  BP meds placed on hold at discharge--patient instructed to check BP's at home and report elevated BP's    11/05/2024:  Dr. Fisher recommended to stop Plavix all together.  Hold ASA until repeat EGD done.  Should eventually return to ASA. Ok to hold  Prinizide,Zestoretic--check BP's at home.  Will restart if elevated.  Continue Toprol XL. F/U with AP    11/11/2024: post hospital follow up. Patient is feeling well overall.  Denies CP, SOB, palpitations, lower leg swelling.  She states she has not had any bloody stool, or dizziness since discharge.  BP's at home have been stable 110-120's.  Patient has stopped her Plavix, but continues to take her aspirin.  Will obtain blood work in 3-4 weeks and repeat EGD in January.            Past Medical History:   Diagnosis Date    Diabetes mellitus (HCC) 4 2023    Hypertension     Obesity     Had weight trell surgery       Review of Systems   Constitutional:  Negative for activity change, appetite change and unexpected weight change.   HENT: Negative.     Eyes: Negative.    Respiratory:  Negative for chest tightness and shortness of breath.    Cardiovascular:  Negative for chest pain, palpitations and leg swelling.   Gastrointestinal:  Negative for blood in stool, nausea and vomiting.   Endocrine: Negative.    Genitourinary: Negative.    Musculoskeletal: Negative.    Skin: Negative.    Allergic/Immunologic: Negative.    Neurological:  Negative for dizziness, syncope, weakness and light-headedness.   Hematological: Negative.    Psychiatric/Behavioral: Negative.         Allergies   Allergen Reactions    Nsaids Other (See Comments)     Weight loss surgery advise to not take          Current Outpatient Medications:     aspirin 81 mg chewable tablet, CHEW AND SWALLOW ONE TABLET BY MOUTH ONCE DAILY, Disp: 90 tablet, Rfl: 0    atorvastatin (LIPITOR) 40 mg tablet, TAKE 1 TABLET BY MOUTH EVERY DAY, Disp: 90 tablet, Rfl: 1    calcium citrate (CALCITRATE) 950 MG tablet, Take 1 tablet by mouth daily, Disp: , Rfl:     cholecalciferol (VITAMIN D3) 1,000 units tablet, Take 1,000 Units by mouth daily, Disp: , Rfl:     glucose blood (FREESTYLE LITE) test strip, Use as instructed, Disp: 300 strip, Rfl: 1    insulin degludec (Tresiba  FlexTouch) 100 units/mL injection pen, INJECT 10 UNITS UNDER THE SKIN DAILY, Disp: 15 mL, Rfl: 1    Insulin Pen Needle (BD Pen Needle Trudy U/F) 32G X 4 MM MISC, FOR USE WITH INSULIN PEN, Disp: 100 each, Rfl: 1    Lancets (freestyle) lancets, Use as instructed, Disp: 300 each, Rfl: 1    metFORMIN (GLUCOPHAGE) 500 mg tablet, TAKE 1 TABLET BY MOUTH TWICE A DAY, Disp: 180 tablet, Rfl: 1    metoprolol succinate (TOPROL-XL) 25 mg 24 hr tablet, Take 1 tablet (25 mg total) by mouth daily, Disp: 90 tablet, Rfl: 3    multivitamin (THERAGRAN) TABS, Take 1 tablet by mouth daily, Disp: , Rfl:     pantoprazole (PROTONIX) 40 mg tablet, Take 1 tablet (40 mg total) by mouth 2 (two) times a day before meals, Disp: 60 tablet, Rfl: 0    sucralfate (CARAFATE) 1 g tablet, Take 1 tablet (1 g total) by mouth every 6 (six) hours, Disp: 120 tablet, Rfl: 0    clopidogrel (PLAVIX) 75 mg tablet, Take 1 tablet (75 mg total) by mouth daily, Disp: 90 tablet, Rfl: 0    cyanocobalamin 100 MCG tablet, Take 100 mcg by mouth daily (Patient not taking: Reported on 2024), Disp: , Rfl:     lisinopril-hydrochlorothiazide (PRINZIDE,ZESTORETIC) 10-12.5 MG per tablet, Take 1 tablet by mouth daily (Patient not taking: Reported on 2024), Disp: 90 tablet, Rfl: 6    Social History     Socioeconomic History    Marital status: /Civil Union     Spouse name: Not on file    Number of children: Not on file    Years of education: Not on file    Highest education level: Not on file   Occupational History    Not on file   Tobacco Use    Smoking status: Former     Current packs/day: 0.00     Average packs/day: 1 pack/day for 20.0 years (20.0 ttl pk-yrs)     Types: Cigarettes     Start date:      Quit date:      Years since quittin.8    Smokeless tobacco: Never    Tobacco comments:     20 years ago.   Vaping Use    Vaping status: Never Used   Substance and Sexual Activity    Alcohol use: Never    Drug use: Never    Sexual activity: Not  Currently     Partners: Male     Birth control/protection: Post-menopausal   Other Topics Concern    Not on file   Social History Narrative    Not on file     Social Determinants of Health     Financial Resource Strain: Not on file   Food Insecurity: No Food Insecurity (10/25/2024)    Nursing - Inadequate Food Risk Classification     Worried About Running Out of Food in the Last Year: Not on file     Ran Out of Food in the Last Year: Not on file     Ran Out of Food in the Last Year: 1   Transportation Needs: No Transportation Needs (10/25/2024)    Nursing - Transportation Risk Classification     Lack of Transportation: Not on file     Lack of Transportation: 2   Physical Activity: Not on file   Stress: Not on file (2024)   Social Connections: Not on file   Intimate Partner Violence: Unknown (10/25/2024)    Nursing IPS     Feels Physically and Emotionally Safe: Not on file     Physically Hurt by Someone: Not on file     Humiliated or Emotionally Abused by Someone: Not on file     Physically Hurt by Someone: 2     Hurt or Threatened by Someone: 2   Housing Stability: Unknown (10/25/2024)    Nursing: Inadequate Housing Risk Classification     Has Housing: Not on file     Worried About Losing Housing: Not on file     Unable to Get Utilities: Not on file     Unable to Pay for Housing in the Last Year: 2     Has Housin     Family History   Problem Relation Age of Onset    Breast cancer Mother 50    Arrhythmia Mother         atrial flutter    Skin cancer Mother     Hypertension Father     Hyperlipidemia Father     Breast cancer Sister 40    Atrial fibrillation Sister     No Known Problems Sister     No Known Problems Daughter     Breast cancer Maternal Grandmother 50    Throat cancer Maternal Grandfather     No Known Problems Paternal Grandmother     No Known Problems Paternal Grandfather     Atrial fibrillation Brother     Other Brother         sarosis of liver    No Known Problems Brother     No Known Problems  "Brother     No Known Problems Brother     No Known Problems Brother     No Known Problems Brother     No Known Problems Brother     Heart attack Paternal Uncle     Breast cancer Maternal Aunt 50    No Known Problems Maternal Aunt     No Known Problems Paternal Aunt     No Known Problems Paternal Aunt     No Known Problems Paternal Aunt     No Known Problems Paternal Aunt     Stroke Neg Hx     Anuerysm Neg Hx     Clotting disorder Neg Hx     Heart failure Neg Hx     Coronary artery disease Neg Hx        Vitals:   Blood pressure 114/72, pulse 72, height 5' 8\" (1.727 m), weight 85.3 kg (188 lb), SpO2 96%.    Wt Readings from Last 10 Encounters:   11/11/24 85.3 kg (188 lb)   11/05/24 81.6 kg (179 lb 14.4 oz)   08/09/24 80.3 kg (177 lb)   03/13/24 81.7 kg (180 lb 3.2 oz)   02/22/24 84.6 kg (186 lb 8 oz)   02/15/24 84.8 kg (187 lb)   02/08/24 84.9 kg (187 lb 3.2 oz)   01/17/24 81.6 kg (180 lb)   12/13/23 81.2 kg (179 lb)   12/12/23 82.2 kg (181 lb 3.2 oz)     Vitals:    11/11/24 1306   BP: 114/72   BP Location: Left arm   Patient Position: Sitting   Cuff Size: Large   Pulse: 72   SpO2: 96%   Weight: 85.3 kg (188 lb)   Height: 5' 8\" (1.727 m)       Physical Exam  Constitutional:       Appearance: Normal appearance.   HENT:      Mouth/Throat:      Mouth: Mucous membranes are moist.   Eyes:      Extraocular Movements: Extraocular movements intact.   Cardiovascular:      Rate and Rhythm: Normal rate and regular rhythm.      Pulses: Normal pulses.      Heart sounds: Normal heart sounds. No murmur heard.  Pulmonary:      Effort: Pulmonary effort is normal.      Breath sounds: Normal breath sounds.   Abdominal:      General: Bowel sounds are normal.      Palpations: Abdomen is soft.   Musculoskeletal:         General: Normal range of motion.      Cervical back: Neck supple.      Right lower leg: No edema.      Left lower leg: No edema.   Skin:     General: Skin is warm and dry.   Neurological:      General: No focal deficit " present.      Mental Status: She is alert and oriented to person, place, and time.   Psychiatric:         Mood and Affect: Mood normal.         Behavior: Behavior normal.       Labs & Results:  Lab Results   Component Value Date    WBC 6.45 11/01/2024    HGB 8.2 (L) 11/01/2024    HCT 26.8 (L) 11/01/2024    MCV 94 11/01/2024     (H) 11/01/2024     Lab Results   Component Value Date    SODIUM 139 10/27/2024    K 5.2 10/27/2024     10/27/2024    CO2 29 10/27/2024    BUN 28 (H) 10/27/2024    CREATININE 1.15 10/27/2024    GLUC 187 (H) 10/27/2024    CALCIUM 9.3 10/27/2024     Lab Results   Component Value Date    INR 1.05 04/15/2023    PROTIME 13.9 04/15/2023     Lab Results   Component Value Date    BNP 68 05/13/2023        FRANKLYN Landin

## 2024-11-11 ENCOUNTER — OFFICE VISIT (OUTPATIENT)
Dept: CARDIOLOGY CLINIC | Facility: CLINIC | Age: 64
End: 2024-11-11
Payer: COMMERCIAL

## 2024-11-11 VITALS
BODY MASS INDEX: 28.49 KG/M2 | HEART RATE: 72 BPM | DIASTOLIC BLOOD PRESSURE: 72 MMHG | SYSTOLIC BLOOD PRESSURE: 114 MMHG | HEIGHT: 68 IN | WEIGHT: 188 LBS | OXYGEN SATURATION: 96 %

## 2024-11-11 DIAGNOSIS — E11.29 TYPE 2 DIABETES MELLITUS WITH ALBUMINURIA  (HCC): ICD-10-CM

## 2024-11-11 DIAGNOSIS — R80.9 TYPE 2 DIABETES MELLITUS WITH ALBUMINURIA  (HCC): ICD-10-CM

## 2024-11-11 DIAGNOSIS — I10 BENIGN ESSENTIAL HYPERTENSION: Primary | ICD-10-CM

## 2024-11-11 DIAGNOSIS — D62 ACUTE BLOOD LOSS ANEMIA: ICD-10-CM

## 2024-11-11 DIAGNOSIS — I25.10 CORONARY ARTERY DISEASE INVOLVING NATIVE CORONARY ARTERY OF NATIVE HEART WITHOUT ANGINA PECTORIS: ICD-10-CM

## 2024-11-11 PROCEDURE — 99214 OFFICE O/P EST MOD 30 MIN: CPT

## 2024-11-11 NOTE — PATIENT INSTRUCTIONS
Report decreased Blood pressure at home <90/60    Report increased lower leg swelling    Continue ASA--will evaluate need after blood work.     Do not take Plavix

## 2024-11-15 ENCOUNTER — OFFICE VISIT (OUTPATIENT)
Dept: FAMILY MEDICINE CLINIC | Facility: MEDICAL CENTER | Age: 64
End: 2024-11-15
Payer: COMMERCIAL

## 2024-11-15 ENCOUNTER — HOSPITAL ENCOUNTER (OUTPATIENT)
Dept: VASCULAR ULTRASOUND | Facility: HOSPITAL | Age: 64
Discharge: HOME | End: 2024-11-15
Attending: STUDENT IN AN ORGANIZED HEALTH CARE EDUCATION/TRAINING PROGRAM
Payer: COMMERCIAL

## 2024-11-15 ENCOUNTER — RESULTS FOLLOW-UP (OUTPATIENT)
Dept: FAMILY MEDICINE CLINIC | Facility: MEDICAL CENTER | Age: 64
End: 2024-11-15

## 2024-11-15 VITALS
HEART RATE: 81 BPM | SYSTOLIC BLOOD PRESSURE: 150 MMHG | RESPIRATION RATE: 18 BRPM | BODY MASS INDEX: 28.76 KG/M2 | OXYGEN SATURATION: 100 % | TEMPERATURE: 97.5 F | DIASTOLIC BLOOD PRESSURE: 80 MMHG | WEIGHT: 189.8 LBS | HEIGHT: 68 IN

## 2024-11-15 DIAGNOSIS — M79.89 LEG SWELLING: Primary | ICD-10-CM

## 2024-11-15 DIAGNOSIS — I10 BENIGN ESSENTIAL HYPERTENSION: ICD-10-CM

## 2024-11-15 DIAGNOSIS — K92.2 GI BLEED: ICD-10-CM

## 2024-11-15 DIAGNOSIS — M79.89 LEG SWELLING: ICD-10-CM

## 2024-11-15 PROCEDURE — 93970 EXTREMITY STUDY: CPT

## 2024-11-15 PROCEDURE — 93970 EXTREMITY STUDY: CPT | Performed by: SURGERY

## 2024-11-15 PROCEDURE — 99214 OFFICE O/P EST MOD 30 MIN: CPT | Performed by: STUDENT IN AN ORGANIZED HEALTH CARE EDUCATION/TRAINING PROGRAM

## 2024-11-15 RX ORDER — PANTOPRAZOLE SODIUM 40 MG/1
40 TABLET, DELAYED RELEASE ORAL
Qty: 180 TABLET | Refills: 0 | Status: SHIPPED | OUTPATIENT
Start: 2024-11-15 | End: 2025-02-13

## 2024-11-15 RX ORDER — SUCRALFATE 1 G/1
1 TABLET ORAL EVERY 6 HOURS SCHEDULED
Qty: 360 TABLET | Refills: 0 | Status: SHIPPED | OUTPATIENT
Start: 2024-11-15 | End: 2025-02-13

## 2024-11-15 NOTE — PROGRESS NOTES
"  [facility] - Clinic Note  Laure Thomas DO, 11/15/24     Amanda Benavidez MRN: [id number] : 1960 Age: 64 y.o.     Assessment/Plan     1. Leg swelling (Primary)    -Advised about leg elevation and avoiding excess salt intake  -Resume thiazide  - VAS lower limb venous duplex scan, complete, bilateral; STAT  -The patient was also instructed to seek medical attention IMMEDIATELY (i.e., day or night) if any cardiopulmonary symptoms occur, especially chest pain, shortness of breath, dyspnea on exertion, paroxysmal nocturnal dyspnea, or orthopnea    2. GI bleed    - pantoprazole (PROTONIX) 40 mg tablet; Take 1 tablet (40 mg total) by mouth 2 (two) times a day before meals  Dispense: 180 tablet; Refill: 0  - sucralfate (CARAFATE) 1 g tablet; Take 1 tablet (1 g total) by mouth every 6 (six) hours  Dispense: 360 tablet; Refill: 0    3. Benign essential hypertension    -Blood pressure readings have been elevated, resume lisinopril-hydrochlorothiazide ten -12.5 mg/tab. 1 tablet p.o. daily  -Call with BP log      Amanda Benavidez acknowledged understanding of treatment plan, all questions answered.    Subjective     Amanda Benavidez is a 64 y.o. female who presents for evaluation of edema in left midfoot to above left ankle. The edema has been moderate. Onset of symptoms was a few days ago, and patient reports symptoms have gradually worsened since that time. \"Had some chips\". The edema is worse at night.  The patient states the problem is long-standing. The swelling has been aggravated by increased salt intake. The swelling has been relieved by elevation of involved area. Associated factors include: nothing. Cardiac risk factors: advanced age (older than 55 for men, 65 for women), diabetes mellitus, dyslipidemia, and hypertension.  Patient also mention she needs refills of Carafate and Protonix.  Has a follow-up with gastroenterology in January.      The following portions of the patient's history were reviewed and " updated as appropriate: allergies, current medications, past family history, past medical history, past social history, past surgical history and problem list.     Past Medical History:   Diagnosis Date    Diabetes mellitus (HCC) 2023    Hypertension     Obesity     Had weight trell surgery       Allergies   Allergen Reactions    Nsaids Other (See Comments)     Weight loss surgery advise to not take        Past Surgical History:   Procedure Laterality Date    BARIATRIC SURGERY      CARDIAC CATHETERIZATION N/A 2023    Procedure: Cardiac Coronary Angiogram;  Surgeon: Syed Baltazar DO;  Location: [facility];  Service: Cardiology    CARDIAC CATHETERIZATION N/A 2023    Procedure: Cardiac pci;  Surgeon: Syed Baltazar DO;  Location: [facility];  Service: Cardiology     SECTION      32 years ago (2019)       Family History   Problem Relation Age of Onset    Breast cancer Mother 50    Arrhythmia Mother         atrial flutter    Skin cancer Mother     Hypertension Father     Hyperlipidemia Father     Breast cancer Sister 40    Atrial fibrillation Sister     No Known Problems Sister     No Known Problems Daughter     Breast cancer Maternal Grandmother 50    Throat cancer Maternal Grandfather     No Known Problems Paternal Grandmother     No Known Problems Paternal Grandfather     Atrial fibrillation Brother     Other Brother         sarosis of liver    No Known Problems Brother     No Known Problems Brother     No Known Problems Brother     No Known Problems Brother     No Known Problems Brother     No Known Problems Brother     Heart attack Paternal Uncle     Breast cancer Maternal Aunt 50    No Known Problems Maternal Aunt     No Known Problems Paternal Aunt     No Known Problems Paternal Aunt     No Known Problems Paternal Aunt     No Known Problems Paternal Aunt     Stroke Neg Hx     Anuerysm Neg Hx     Clotting disorder Neg Hx     Heart failure Neg Hx     Coronary  artery disease Neg Hx        Social History     Socioeconomic History    Marital status: /Civil Union     Spouse name: None    Number of children: None    Years of education: None    Highest education level: None   Occupational History    None   Tobacco Use    Smoking status: Former     Current packs/day: 0.00     Average packs/day: 1 pack/day for 20.0 years (20.0 ttl pk-yrs)     Types: Cigarettes     Start date:      Quit date:      Years since quittin.8    Smokeless tobacco: Never    Tobacco comments:     20 years ago.   Vaping Use    Vaping status: Never Used   Substance and Sexual Activity    Alcohol use: Never    Drug use: Never    Sexual activity: Not Currently     Partners: Male     Birth control/protection: Post-menopausal   Other Topics Concern    None   Social History Narrative    None     Social Drivers of Health     Financial Resource Strain: Not on file   Food Insecurity: No Food Insecurity (10/25/2024)    Nursing - Inadequate Food Risk Classification     Worried About Running Out of Food in the Last Year: Not on file     Ran Out of Food in the Last Year: Not on file     Ran Out of Food in the Last Year: 1   Transportation Needs: No Transportation Needs (10/25/2024)    Nursing - Transportation Risk Classification     Lack of Transportation: Not on file     Lack of Transportation: 2   Physical Activity: Not on file   Stress: Not on file (2024)   Social Connections: Not on file   Intimate Partner Violence: Unknown (10/25/2024)    Nursing IPS     Feels Physically and Emotionally Safe: Not on file     Physically Hurt by Someone: Not on file     Humiliated or Emotionally Abused by Someone: Not on file     Physically Hurt by Someone: 2     Hurt or Threatened by Someone: 2   Housing Stability: Unknown (10/25/2024)    Nursing: Inadequate Housing Risk Classification     Has Housing: Not on file     Worried About Losing Housing: Not on file     Unable to Get Utilities: Not on file      Unable to Pay for Housing in the Last Year: 2     Has Housin       Current Outpatient Medications   Medication Sig Dispense Refill    aspirin 81 mg chewable tablet CHEW AND SWALLOW ONE TABLET BY MOUTH ONCE DAILY 90 tablet 0    atorvastatin (LIPITOR) 40 mg tablet TAKE 1 TABLET BY MOUTH EVERY DAY 90 tablet 1    calcium citrate (CALCITRATE) 950 MG tablet Take 1 tablet by mouth daily      cholecalciferol (VITAMIN D3) 1,000 units tablet Take 1,000 Units by mouth daily      glucose blood (FREESTYLE LITE) test strip Use as instructed 300 strip 1    insulin degludec (Tresiba FlexTouch) 100 units/mL injection pen INJECT 10 UNITS UNDER THE SKIN DAILY 15 mL 1    Insulin Pen Needle (BD Pen Needle Trudy U/F) 32G X 4 MM MISC FOR USE WITH INSULIN  each 1    Lancets (freestyle) lancets Use as instructed 300 each 1    metFORMIN (GLUCOPHAGE) 500 mg tablet TAKE 1 TABLET BY MOUTH TWICE A  tablet 1    metoprolol succinate (TOPROL-XL) 25 mg 24 hr tablet Take 1 tablet (25 mg total) by mouth daily 90 tablet 3    multivitamin (THERAGRAN) TABS Take 1 tablet by mouth daily      pantoprazole (PROTONIX) 40 mg tablet Take 1 tablet (40 mg total) by mouth 2 (two) times a day before meals 180 tablet 0    sucralfate (CARAFATE) 1 g tablet Take 1 tablet (1 g total) by mouth every 6 (six) hours 360 tablet 0    clopidogrel (PLAVIX) 75 mg tablet Take 1 tablet (75 mg total) by mouth daily 90 tablet 0    cyanocobalamin 100 MCG tablet Take 100 mcg by mouth daily (Patient not taking: Reported on 2024)      lisinopril-hydrochlorothiazide (PRINZIDE,ZESTORETIC) 10-12.5 MG per tablet Take 1 tablet by mouth daily (Patient not taking: Reported on 11/15/2024) 90 tablet 6     No current facility-administered medications for this visit.       Review of Systems     As noted in HPI    Objective      /80 (BP Location: Left arm, Patient Position: Sitting, Cuff Size: Standard)   Pulse 81   Temp 97.5 °F (36.4 °C) (Temporal)   Resp 18   Ht  "5' 8\" (1.727 m)   Wt 86.1 kg (189 lb 12.8 oz)   SpO2 100%   BMI 28.86 kg/m²     Physical Exam  Vitals reviewed.   Constitutional:       General: She is not in acute distress.     Appearance: Normal appearance.   HENT:      Head: Normocephalic and atraumatic.   Eyes:      Conjunctiva/sclera: Conjunctivae normal.   Cardiovascular:      Rate and Rhythm: Normal rate and regular rhythm.      Pulses: Normal pulses.      Heart sounds: Normal heart sounds.   Pulmonary:      Effort: Pulmonary effort is normal.      Breath sounds: Normal breath sounds.   Musculoskeletal:      Right lower leg: Edema present.      Left lower leg: Edema present.      Comments: Bilateral lower extremity swelling left more so than right mid foot to mid calf  Some erythema and slight tactile warmth left lower extremity localized  Tenderness left calf   Neurological:      Mental Status: She is alert and oriented to person, place, and time.   Psychiatric:         Mood and Affect: Mood normal.         Behavior: Behavior normal.         Thought Content: Thought content normal.             Some portions of this record may have been generated with voice recognition software. There may be translation, syntax, or grammatical errors. Occasional wrong word or \"sound-a-like\" substitutions may have occurred due to the inherent limitations of the voice recognition software. Read the chart carefully and recognize, using context, where substations may have occurred. If you have any questions, please contact the dictating provider for clarification or correction, as needed.  "

## 2024-11-19 ENCOUNTER — RESULTS FOLLOW-UP (OUTPATIENT)
Dept: HEMATOLOGY ONCOLOGY | Facility: CLINIC | Age: 64
End: 2024-11-19

## 2024-11-19 NOTE — TELEPHONE ENCOUNTER
Called patient and left a voicemail instructing patient to call back and schedule a f/u visit.     ----- Message from Bria Mejía DO sent at 11/19/2024 10:51 AM EST -----  Please schedule patient for follow-up visit to review recent labs.  She was initially seen in January and has not had a follow-up visit since then.

## 2024-12-02 ENCOUNTER — APPOINTMENT (OUTPATIENT)
Dept: LAB | Facility: MEDICAL CENTER | Age: 64
End: 2024-12-02
Payer: COMMERCIAL

## 2024-12-02 DIAGNOSIS — D62 ACUTE BLOOD LOSS ANEMIA: ICD-10-CM

## 2024-12-02 LAB
BASOPHILS # BLD AUTO: 0.04 THOUSANDS/ΜL (ref 0–0.1)
BASOPHILS NFR BLD AUTO: 1 % (ref 0–1)
EOSINOPHIL # BLD AUTO: 0.16 THOUSAND/ΜL (ref 0–0.61)
EOSINOPHIL NFR BLD AUTO: 2 % (ref 0–6)
ERYTHROCYTE [DISTWIDTH] IN BLOOD BY AUTOMATED COUNT: 13.8 % (ref 11.6–15.1)
HCT VFR BLD AUTO: 29 % (ref 34.8–46.1)
HGB BLD-MCNC: 9.1 G/DL (ref 11.5–15.4)
IMM GRANULOCYTES # BLD AUTO: 0.02 THOUSAND/UL (ref 0–0.2)
IMM GRANULOCYTES NFR BLD AUTO: 0 % (ref 0–2)
LYMPHOCYTES # BLD AUTO: 2.07 THOUSANDS/ΜL (ref 0.6–4.47)
LYMPHOCYTES NFR BLD AUTO: 26 % (ref 14–44)
MCH RBC QN AUTO: 28.3 PG (ref 26.8–34.3)
MCHC RBC AUTO-ENTMCNC: 31.4 G/DL (ref 31.4–37.4)
MCV RBC AUTO: 90 FL (ref 82–98)
MONOCYTES # BLD AUTO: 0.75 THOUSAND/ΜL (ref 0.17–1.22)
MONOCYTES NFR BLD AUTO: 9 % (ref 4–12)
NEUTROPHILS # BLD AUTO: 4.98 THOUSANDS/ΜL (ref 1.85–7.62)
NEUTS SEG NFR BLD AUTO: 62 % (ref 43–75)
NRBC BLD AUTO-RTO: 0 /100 WBCS
PLATELET # BLD AUTO: 371 THOUSANDS/UL (ref 149–390)
PMV BLD AUTO: 10.6 FL (ref 8.9–12.7)
RBC # BLD AUTO: 3.22 MILLION/UL (ref 3.81–5.12)
WBC # BLD AUTO: 8.02 THOUSAND/UL (ref 4.31–10.16)

## 2024-12-02 PROCEDURE — 36415 COLL VENOUS BLD VENIPUNCTURE: CPT

## 2024-12-02 PROCEDURE — 85025 COMPLETE CBC W/AUTO DIFF WBC: CPT

## 2024-12-04 ENCOUNTER — RESULTS FOLLOW-UP (OUTPATIENT)
Dept: FAMILY MEDICINE CLINIC | Facility: MEDICAL CENTER | Age: 64
End: 2024-12-04

## 2024-12-19 DIAGNOSIS — E11.65 TYPE 2 DIABETES MELLITUS WITH HYPERGLYCEMIA, WITHOUT LONG-TERM CURRENT USE OF INSULIN (HCC): ICD-10-CM

## 2024-12-23 ENCOUNTER — TELEPHONE (OUTPATIENT)
Dept: GASTROENTEROLOGY | Facility: CLINIC | Age: 64
End: 2024-12-23

## 2024-12-23 NOTE — TELEPHONE ENCOUNTER
Dr. Fisher    Our mutual patient is scheduled for procedure: EGD    On: __1__/_ 31   _/_ 25   _      With: Dr. Cunha_______    He/She is taking the following blood thinner:  Plavix    Can this be stopped __5____ days prior to the procedure?      Physician Approving clearance: ________________________    Thank you!

## 2024-12-23 NOTE — TELEPHONE ENCOUNTER
Left message for pt that she may hold her Plavix 5 days prior to her January 31st procedure per Dr. Fisher. Asked her to call and confirm she received message.

## 2024-12-24 DIAGNOSIS — Z95.5 STATUS POST INSERTION OF DRUG ELUTING CORONARY ARTERY STENT: ICD-10-CM

## 2024-12-24 RX ORDER — CLOPIDOGREL BISULFATE 75 MG/1
75 TABLET ORAL DAILY
Qty: 90 TABLET | Refills: 0 | Status: SHIPPED | OUTPATIENT
Start: 2024-12-24

## 2025-01-10 DIAGNOSIS — E78.00 PURE HYPERCHOLESTEROLEMIA: ICD-10-CM

## 2025-01-10 DIAGNOSIS — R73.9 HYPERGLYCEMIA: ICD-10-CM

## 2025-01-13 DIAGNOSIS — E78.00 PURE HYPERCHOLESTEROLEMIA: ICD-10-CM

## 2025-01-13 RX ORDER — ATORVASTATIN CALCIUM 40 MG/1
40 TABLET, FILM COATED ORAL DAILY
Qty: 30 TABLET | Refills: 0 | Status: SHIPPED | OUTPATIENT
Start: 2025-01-13 | End: 2025-01-16

## 2025-01-13 NOTE — TELEPHONE ENCOUNTER
BROOK VM to call back and schedule appt.   Tylenol for pain  Ice pack  Light activity  Follow-up with Dr. Short as needed  No work tomorrow, Monday 1-25-21   Detail Level: Zone Detail Level: Generalized

## 2025-01-16 RX ORDER — ATORVASTATIN CALCIUM 40 MG/1
40 TABLET, FILM COATED ORAL DAILY
Qty: 90 TABLET | Refills: 3 | Status: SHIPPED | OUTPATIENT
Start: 2025-01-16

## 2025-01-17 ENCOUNTER — TELEPHONE (OUTPATIENT)
Age: 65
End: 2025-01-17

## 2025-01-17 ENCOUNTER — ANESTHESIA (OUTPATIENT)
Dept: ANESTHESIOLOGY | Facility: HOSPITAL | Age: 65
End: 2025-01-17

## 2025-01-17 ENCOUNTER — ANESTHESIA EVENT (OUTPATIENT)
Dept: ANESTHESIOLOGY | Facility: HOSPITAL | Age: 65
End: 2025-01-17

## 2025-01-17 DIAGNOSIS — E11.65 TYPE 2 DIABETES MELLITUS WITH HYPERGLYCEMIA, WITHOUT LONG-TERM CURRENT USE OF INSULIN (HCC): Primary | ICD-10-CM

## 2025-01-17 DIAGNOSIS — R73.9 HYPERGLYCEMIA: ICD-10-CM

## 2025-01-17 RX ORDER — INSULIN DEGLUDEC 100 U/ML
INJECTION, SOLUTION SUBCUTANEOUS
Refills: 1 | OUTPATIENT
Start: 2025-01-17

## 2025-01-17 RX ORDER — INSULIN DEGLUDEC 100 U/ML
INJECTION, SOLUTION SUBCUTANEOUS
Qty: 15 ML | Refills: 1 | OUTPATIENT
Start: 2025-01-17

## 2025-01-17 NOTE — TELEPHONE ENCOUNTER
Patient requested insulin degludec (Tresiba FlexTouch) 100 units/mL injection pen be refilled.    Please send insulin degludec (Tresiba FlexTouch) 100 units/mL injection pen to [facility] in [place].

## 2025-01-17 NOTE — TELEPHONE ENCOUNTER
Patient calling in regards to lab work.    Has appointment scheduled for 2/17/25.    Please have provider order necessary lab work.    Please advise patient when lab work is ordered so she can complete.

## 2025-01-23 ENCOUNTER — ANNUAL EXAM (OUTPATIENT)
Dept: OBGYN CLINIC | Facility: CLINIC | Age: 65
End: 2025-01-23
Payer: COMMERCIAL

## 2025-01-23 VITALS
SYSTOLIC BLOOD PRESSURE: 140 MMHG | BODY MASS INDEX: 28.04 KG/M2 | WEIGHT: 185 LBS | DIASTOLIC BLOOD PRESSURE: 80 MMHG | HEIGHT: 68 IN

## 2025-01-23 DIAGNOSIS — Z01.419 ENCOUNTER FOR WELL WOMAN EXAM: Primary | ICD-10-CM

## 2025-01-23 DIAGNOSIS — Z78.0 POSTMENOPAUSAL: ICD-10-CM

## 2025-01-23 DIAGNOSIS — Z12.39 ENCOUNTER FOR SCREENING BREAST EXAMINATION: ICD-10-CM

## 2025-01-23 DIAGNOSIS — Z12.31 ENCOUNTER FOR SCREENING MAMMOGRAM FOR MALIGNANT NEOPLASM OF BREAST: ICD-10-CM

## 2025-01-23 DIAGNOSIS — D50.9 IRON DEFICIENCY ANEMIA, UNSPECIFIED IRON DEFICIENCY ANEMIA TYPE: ICD-10-CM

## 2025-01-23 DIAGNOSIS — Z01.419 WELL WOMAN EXAM WITH ROUTINE GYNECOLOGICAL EXAM: ICD-10-CM

## 2025-01-23 PROCEDURE — 99397 PER PM REEVAL EST PAT 65+ YR: CPT | Performed by: PHYSICIAN ASSISTANT

## 2025-01-23 NOTE — PROGRESS NOTES
Assessment/Plan:      Diagnoses and all orders for this visit:    Encounter for well woman exam    Encounter for screening breast examination    Iron deficiency anemia, unspecified iron deficiency anemia type  -     CBC and differential; Future    Postmenopausal    Encounter for screening mammogram for malignant neoplasm of breast  -     Mammo screening bilateral w 3d and cad    Well woman exam with routine gynecological exam  -     Mammo screening bilateral w 3d and cad          Subjective:     Patient ID: Amanda Benavidez is a 64 y.o. female.    Pt presents for her annual exam today--  She has no gyn complaints  Found to have 3 upper gi ulcers in the Fall 2024--pt's hgb down to 4.2  Feeling MUCH better s/p transfusion, Carafate rx... and pt has repeat endoscopy next week!  She has no bleeding or pelvic pain  Bowel and bladder are regular  Colonoscopy--2013  No breast concerns today  Last mammo--2024    No pap today.    Rx mammo  Daily ca, d        Review of Systems   Constitutional:  Negative for chills, fever and unexpected weight change.   HENT:  Negative for ear pain and sore throat.    Eyes:  Negative for pain and visual disturbance.   Respiratory:  Negative for cough and shortness of breath.    Cardiovascular:  Negative for chest pain and palpitations.   Gastrointestinal:  Negative for abdominal pain, blood in stool, constipation, diarrhea and vomiting.   Genitourinary: Negative.  Negative for dysuria and hematuria.   Musculoskeletal:  Negative for arthralgias and back pain.   Skin:  Negative for color change and rash.   Neurological:  Negative for seizures and syncope.   All other systems reviewed and are negative.        Objective:     Physical Exam  Vitals and nursing note reviewed.   Constitutional:       Appearance: Normal appearance. She is well-developed.   HENT:      Head: Normocephalic and atraumatic.   Chest:   Breasts:     Right: No inverted nipple, mass, nipple discharge or skin change.      Left: No  inverted nipple, mass, nipple discharge or skin change.   Abdominal:      Palpations: Abdomen is soft.   Genitourinary:     Exam position: Supine.      Labia:         Right: No rash, tenderness or lesion.         Left: No rash, tenderness or lesion.       Vagina: Normal.      Cervix: No cervical motion tenderness, discharge or friability.      Adnexa:         Right: No mass, tenderness or fullness.          Left: No mass, tenderness or fullness.        Comments: Mild atrophy noted  Musculoskeletal:      Cervical back: Normal range of motion.   Lymphadenopathy:      Lower Body: No right inguinal adenopathy. No left inguinal adenopathy.   Neurological:      Mental Status: She is alert.

## 2025-01-25 ENCOUNTER — APPOINTMENT (OUTPATIENT)
Dept: LAB | Facility: MEDICAL CENTER | Age: 65
End: 2025-01-25
Payer: COMMERCIAL

## 2025-01-25 DIAGNOSIS — D50.9 IRON DEFICIENCY ANEMIA, UNSPECIFIED IRON DEFICIENCY ANEMIA TYPE: ICD-10-CM

## 2025-01-25 DIAGNOSIS — E11.65 TYPE 2 DIABETES MELLITUS WITH HYPERGLYCEMIA, WITHOUT LONG-TERM CURRENT USE OF INSULIN (HCC): ICD-10-CM

## 2025-01-25 LAB
BASOPHILS # BLD AUTO: 0.04 THOUSANDS/ΜL (ref 0–0.1)
BASOPHILS NFR BLD AUTO: 1 % (ref 0–1)
EOSINOPHIL # BLD AUTO: 0.13 THOUSAND/ΜL (ref 0–0.61)
EOSINOPHIL NFR BLD AUTO: 2 % (ref 0–6)
ERYTHROCYTE [DISTWIDTH] IN BLOOD BY AUTOMATED COUNT: 14.6 % (ref 11.6–15.1)
HCT VFR BLD AUTO: 33.6 % (ref 34.8–46.1)
HGB BLD-MCNC: 10.2 G/DL (ref 11.5–15.4)
IMM GRANULOCYTES # BLD AUTO: 0.02 THOUSAND/UL (ref 0–0.2)
IMM GRANULOCYTES NFR BLD AUTO: 0 % (ref 0–2)
LYMPHOCYTES # BLD AUTO: 2.1 THOUSANDS/ΜL (ref 0.6–4.47)
LYMPHOCYTES NFR BLD AUTO: 30 % (ref 14–44)
MCH RBC QN AUTO: 27.3 PG (ref 26.8–34.3)
MCHC RBC AUTO-ENTMCNC: 30.4 G/DL (ref 31.4–37.4)
MCV RBC AUTO: 90 FL (ref 82–98)
MONOCYTES # BLD AUTO: 0.66 THOUSAND/ΜL (ref 0.17–1.22)
MONOCYTES NFR BLD AUTO: 9 % (ref 4–12)
NEUTROPHILS # BLD AUTO: 4.13 THOUSANDS/ΜL (ref 1.85–7.62)
NEUTS SEG NFR BLD AUTO: 58 % (ref 43–75)
NRBC BLD AUTO-RTO: 0 /100 WBCS
PLATELET # BLD AUTO: 309 THOUSANDS/UL (ref 149–390)
PMV BLD AUTO: 11.2 FL (ref 8.9–12.7)
RBC # BLD AUTO: 3.74 MILLION/UL (ref 3.81–5.12)
WBC # BLD AUTO: 7.08 THOUSAND/UL (ref 4.31–10.16)

## 2025-01-25 PROCEDURE — 85025 COMPLETE CBC W/AUTO DIFF WBC: CPT

## 2025-01-25 PROCEDURE — 36415 COLL VENOUS BLD VENIPUNCTURE: CPT

## 2025-01-28 ENCOUNTER — RESULTS FOLLOW-UP (OUTPATIENT)
Dept: OBGYN CLINIC | Facility: CLINIC | Age: 65
End: 2025-01-28

## 2025-01-31 ENCOUNTER — HOSPITAL ENCOUNTER (OUTPATIENT)
Dept: GASTROENTEROLOGY | Facility: AMBULARY SURGERY CENTER | Age: 65
Setting detail: OUTPATIENT SURGERY
End: 2025-01-31
Attending: INTERNAL MEDICINE
Payer: COMMERCIAL

## 2025-01-31 ENCOUNTER — ANESTHESIA EVENT (OUTPATIENT)
Dept: GASTROENTEROLOGY | Facility: AMBULARY SURGERY CENTER | Age: 65
End: 2025-01-31
Payer: COMMERCIAL

## 2025-01-31 VITALS
TEMPERATURE: 97.8 F | HEIGHT: 68 IN | BODY MASS INDEX: 27.58 KG/M2 | RESPIRATION RATE: 12 BRPM | WEIGHT: 182 LBS | SYSTOLIC BLOOD PRESSURE: 102 MMHG | DIASTOLIC BLOOD PRESSURE: 61 MMHG | OXYGEN SATURATION: 100 % | HEART RATE: 64 BPM

## 2025-01-31 DIAGNOSIS — K92.2 GI BLEED: ICD-10-CM

## 2025-01-31 DIAGNOSIS — K25.0 ACUTE GASTRIC ULCER WITH HEMORRHAGE: ICD-10-CM

## 2025-01-31 LAB — GLUCOSE SERPL-MCNC: 168 MG/DL (ref 65–140)

## 2025-01-31 PROCEDURE — 82948 REAGENT STRIP/BLOOD GLUCOSE: CPT

## 2025-01-31 PROCEDURE — 88342 IMHCHEM/IMCYTCHM 1ST ANTB: CPT | Performed by: PATHOLOGY

## 2025-01-31 PROCEDURE — 43239 EGD BIOPSY SINGLE/MULTIPLE: CPT | Performed by: INTERNAL MEDICINE

## 2025-01-31 PROCEDURE — 88305 TISSUE EXAM BY PATHOLOGIST: CPT | Performed by: PATHOLOGY

## 2025-01-31 PROCEDURE — 88341 IMHCHEM/IMCYTCHM EA ADD ANTB: CPT | Performed by: PATHOLOGY

## 2025-01-31 RX ORDER — SODIUM CHLORIDE, SODIUM LACTATE, POTASSIUM CHLORIDE, CALCIUM CHLORIDE 600; 310; 30; 20 MG/100ML; MG/100ML; MG/100ML; MG/100ML
INJECTION, SOLUTION INTRAVENOUS CONTINUOUS PRN
Status: DISCONTINUED | OUTPATIENT
Start: 2025-01-31 | End: 2025-01-31

## 2025-01-31 RX ORDER — PANTOPRAZOLE SODIUM 40 MG/1
40 TABLET, DELAYED RELEASE ORAL
Qty: 90 TABLET | Refills: 3 | Status: SHIPPED | OUTPATIENT
Start: 2025-01-31 | End: 2025-05-01

## 2025-01-31 RX ORDER — PROPOFOL 10 MG/ML
INJECTION, EMULSION INTRAVENOUS AS NEEDED
Status: DISCONTINUED | OUTPATIENT
Start: 2025-01-31 | End: 2025-01-31

## 2025-01-31 RX ADMIN — PROPOFOL 30 MG: 10 INJECTION, EMULSION INTRAVENOUS at 09:23

## 2025-01-31 RX ADMIN — SODIUM CHLORIDE, SODIUM LACTATE, POTASSIUM CHLORIDE, AND CALCIUM CHLORIDE: .6; .31; .03; .02 INJECTION, SOLUTION INTRAVENOUS at 08:00

## 2025-01-31 RX ADMIN — PROPOFOL 20 MG: 10 INJECTION, EMULSION INTRAVENOUS at 09:24

## 2025-01-31 RX ADMIN — PROPOFOL 150 MG: 10 INJECTION, EMULSION INTRAVENOUS at 09:21

## 2025-01-31 NOTE — ANESTHESIA PREPROCEDURE EVALUATION
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      Anesthesia Plan  ASA Score- 3     Anesthesia Type- IV sedation with anesthesia with ASA Monitors.         Additional Monitors:     Airway Plan:            Plan Factors-Exercise tolerance (METS): >4 METS.    Chart reviewed.    Patient summary reviewed.    Patient is not a current smoker.              Induction- intravenous.    Postoperative Plan-         Informed Consent- Anesthetic plan and risks discussed with patient.  I personally reviewed this patient with the CRNA. Discussed and agreed on the Anesthesia Plan with the CRNA..      NPO Status:  Vitals Value Taken Time   Date of last liquid 01/31/25 01/31/25 0817   Time of last liquid 0700 01/31/25 0817   Date of last solid 01/30/25 01/31/25 0817   Time of last solid 2300 01/31/25 0817

## 2025-01-31 NOTE — LETTER
May 17, 2023     [address]    Patient: Darcy Reed   YOB: 1960   Date of Visit: 5/17/2023       Dear Dr Ramon Vinson:    Thank you for referring Darcy Reed to me for evaluation  Below are my notes for this consultation  If you have questions, please do not hesitate to call me  I look forward to following your patient along with you  Sincerely,        FRANKLYN Obrien        CC: MD Hola Pineda, [facility]  5/17/2023  9:06 AM  Incomplete  Cardiology  Hospital Follow Up   Office Visit Note  Darcy Reed   57 y o    female   MRN: [id number]  [address]  [address] [address] [phone]    PCP: Lesley Sadler MD  Cardiologist: Dr Elayne Lira                  Summary of recommendations  -DASH diet  She has been consuming excess sodium, crackers, cottage cheese  She is retaining fluid  I educated her how to read a food label to facilitate adherence   -Echocardiogram-scheduled  -Add lisinopril HCTZ 20/25 mg- one half tablet daily ( had been on hold tablet in the past)  -Hydrate-- 64 oz H20 a day  -Non Fasting BMP 1- 2 weeks  -Periodic home BP measurement  This especially important considering on adding back medication  needs to bring machine in for validation  -Stress echo given CP, recent ED visit  -Lipid profile with direct LDL 1mo  Recently initiated on a statin  -Aggressive CV risk factor modification, A1C is > 14  -Follow up will be scheduled with Dr Elayne Lira 6/21/23        Assessment/plan  Chest pain  Recent ED admission  Troponins negative  Her blood pressure was elevated  Some typical, some atypical symptoms  She has several CV risk factors  We will pursue a stress echocardiogram   I asked her to hold the beta-blocker the morning of the test  Will enhance therapy for her BP   She does have evidence of volume  Overload which may be contributing  PAF  • Very remote episode in the setting of severe obesity, without recurrence  Hypertension, essential   BP  145/89  Toprol 25 mg daily  We will add lisinopril/HCTZ 20/25 1/2 tablet daily  In the past she was taking a full tablet -Nonfasting BMP in a couple weeks  Encouraged hydration  Hyperlipidemia, recently started on atorvastatin 20 mg daily in April 2023 4/17/2023: Calculated   Heart healthy diet  Reassess   Latest Reference Range & Units 09/13/14 08:00 04/25/15 08:11 08/01/15 07:46 03/24/16 07:26 03/09/19 07:05 04/17/23 09:08   Cholesterol See Comment mg/dL 188 142 149 166 214 (H) 182   Triglycerides See Comment mg/dL 94 64 73 89 119 173 (H)   HDL >=50 mg/dL 61 50 59 50 58 38 (L)   Non-HDL Cholesterol mg/dl     156    LDL Calculated 0 - 100 mg/dL 108 (H) 79 75 98 132 (H) 109 (H)   T2DM  4/15/23: HgA1c > 14, now on insulin, metformin  Following with Endo  Obesity, Hx Gastric bypasss  BMI is 32  Regained weight  In 2021 was up to 240 lbs  Has lost, and is now 216 lb  Cardiac testing  • TTE will be ordered                  HPI   Claudia Pizarro is a 57 y o  female with HTN, DM, obesity, status post Tramaine en Ygastric bypass years ago  After gastric bypass, she came off her oral antihyperglycemic agents  She has a history of PAF in the setting of obesity  She had no recurrence  She last saw Dr Ella Taylor 3/2021 and she was noted to be regaining weight and was up to 240 pounds, status post gastric bypass  Her blood pressure was controlled  She was advised mechanisms to lose weight    Adm 4/15-4/17/23  CC: low BP, weakness, fatigue x 1 week  BS > 700, ANA, creatinine 1 5  followed by endo, started on insulin, IV fluids  She was previously on metformin but was stopped as her blood sugars improved following weight loss  She lost follow-up with her PCP given senior care  Lisinopril/HCTZ stopped  EKG: Initial concern for pericarditis with ST elevation    However no clinical symptoms  Not followed by cardiology  Continued on Toprol  She had pseudohyponatremia which improved with improved glucose control    4/17/23  Hospital follow-up  She is accompanied by her   She was found to have marked hyperglycemia with an A1c greater than 14  She is now on insulin  She tells me since discharge, she is feeling a significant improvement as compared to prior  Her lightheadedness, dizziness, and extreme fatigue have all resolved  She does have some visual changes that are improving with time  She denies chest pain, or palpitations  She had no recurrence of A-fib  She was seen by a dietitian in the hospital   She is continuing on her journey of what are appropriate foods, given her prior gastric bypass, and now diabetes with hyperglycemia, requiring insulin  Blood pressure by me 128/84 on Toprol 25 mg daily  Since her last visit with Dr Yasmeen Gaston, she is down to 211 pounds  For now lisinopril has been held  Given her diabetes she would likely benefit from an ACE inhibitor  For now, we will leave off until we have some additional blood pressure data    Interval history  ED 5/15/23  CC: intermittent  chest pain associated symptoms  Patient reported she felt it was likely musculoskeletal due to pain starting when she is trying to do something with her left hand or walking with something in the left hand  PE significant for LE edema  Work-up unremarkable including troponins x 2 and BNP  /97  DCd to OP F/U    5/17/23  ED Hospital follow-up  She is accompanied by her   Recently, she bred puppies  Seven were delivered, the morning that she went to the emergency room  Prior to the delivery, she was lying on her side to assess the puppies heart rates, etc  with an ultrasound machine  She was in awkward positions  She was bending over and doing a lot of physical work  With these activities she noted anterior chest heaviness, reccurent  This prompted her admission  Troponins  were negative; her blood pressure was elevated as above  Her EKG showed normal sinus rhythm with lateral ST depressions; this was reviewed personally  Lisette Jimenez She was discharged to follow-up as an outpatient  Since discharge she has had some recurrence of chest heaviness with activities  Other times she has been able to exert herself without the chest heaviness  She does note she has had the development of lower extremity edema of recent  She did see a dietitian  She has been eating things like cottage cheese and some saltine crackers  Unfortunately, she has developed lower extremity edema as a result  I reviewed how to read food labels and asked her to try to avoid dietary sodium  Today, her blood pressure is 145/89  I will add back lisinopril/HCTZ 20/25 which she was on in the past   However I asked her to take only half tablet as she is now on metoprolol  We will also get labs in the near future  I asked her to monitor her blood pressure at home  I emphasized the importance of this  Given that she has busy with her puppies, she had not been checking it at home  I ordered a stress echocardiogram given her significant CV risk factors: Uncontrolled diabetes, mixed dyslipidemia, hypertension  She may have some chest discomfort related to volume  She gained 2 pounds recently          I have spent 25 minutes with Patient and family today in which greater than 50% of this time was spent in counseling/coordination of care regarding Instructions for management, Patient and family education, Importance of tx compliance, Risk factor reductions, Documenting in the medical record, Reviewing / ordering tests, medicine, procedures   and Obtaining or reviewing history    Assessment  Diagnoses and all orders for this visit:    Chest pain, unspecified type  -     Echo stress test, exercise;  Future    Benign essential hypertension  -     lisinopril-hydrochlorothiazide (PRINZIDE,ZESTORETIC) 20-25 MG per tablet; Take 0 5 tablets by mouth daily  -     Basic metabolic panel; Future  -     Echo stress test, exercise; Future    Paroxysmal atrial fibrillation (HCC)    Mixed hyperlipidemia  -     Lipid Panel With Direct LDL; Future    Pure hypercholesterolemia  -     atorvastatin (LIPITOR) 20 mg tablet; Take 1 tablet (20 mg total) by mouth daily  -     Echo stress test, exercise; Future    Class 1 obesity due to excess calories with body mass index (BMI) of 32 0 to 32 9 in adult, unspecified whether serious comorbidity present          Past Medical History:   Diagnosis Date   • Diabetes mellitus ([facility] [address] ) 4 2023   • Hypertension    • Obesity     Had weight trell surgery       Review of Systems   Constitutional: Negative for chills  Cardiovascular: Positive for chest pain and leg swelling  Negative for claudication, cyanosis, dyspnea on exertion, irregular heartbeat, near-syncope, orthopnea, palpitations, paroxysmal nocturnal dyspnea and syncope  See HPI   Respiratory: Negative for cough and shortness of breath  Gastrointestinal: Negative for heartburn and nausea  Neurological: Negative for dizziness, focal weakness, headaches, light-headedness and weakness  All other systems reviewed and are negative  Allergies   Allergen Reactions   • Other Other (See Comments)     No Nsaids due to weight loss surgery        Current Outpatient Medications:   •  atorvastatin (LIPITOR) 20 mg tablet, Take 1 tablet (20 mg total) by mouth daily, Disp: 90 tablet, Rfl: 1  •  Blood Glucose Monitoring Suppl (OneTouch Verio Reflect) w/Device KIT, May substitute brand based on insurance coverage  Check glucose ACHS  , Disp: 1 kit, Rfl: 0  •  calcium citrate (CALCITRATE) 950 MG tablet, Take 1 tablet by mouth daily, Disp: , Rfl:   •  cholecalciferol (VITAMIN D3) 1,000 units tablet, Take 1,000 Units by mouth daily, Disp: , Rfl:   •  cyanocobalamin 100 MCG tablet, Take 100 mcg by mouth every other day , Disp: , Rfl:   •  glucose blood (OneTouch Verio) test strip, May substitute brand based on insurance coverage  Check glucose ACHS  , Disp: 200 each, Rfl: 0  •  insulin degludec Veryl Soulier FlexTouch) 100 units/mL injection pen, 10 units daily  Dose change, Disp: 15 mL, Rfl: 0  •  Insulin Pen Needle (BD Pen Needle Trudy 2nd Gen) 32G X 4 MM MISC, For use with insulin pen  Pharmacy may dispense brand covered by insurance , Disp: 100 each, Rfl: 0  •  lisinopril-hydrochlorothiazide (PRINZIDE,ZESTORETIC) 20-25 MG per tablet, Take 0 5 tablets by mouth daily, Disp: , Rfl:   •  metFORMIN (GLUCOPHAGE) 500 mg tablet, 1 tab bid, Disp: 60 tablet, Rfl: 1  •  metoprolol succinate (TOPROL-XL) 25 mg 24 hr tablet, TAKE ONE TABLET BY MOUTH ONCE DAILY, Disp: 90 tablet, Rfl: 0  •  multivitamin (THERAGRAN) TABS, Take 1 tablet by mouth daily, Disp: , Rfl:   •  OneTouch Delica Lancets 89R MISC, May substitute brand based on insurance coverage  Check glucose ACHS  , Disp: 200 each, Rfl: 0        Social History     Socioeconomic History   • Marital status: /Civil Union     Spouse name: Not on file   • Number of children: Not on file   • Years of education: Not on file   • Highest education level: Not on file   Occupational History   • Not on file   Tobacco Use   • Smoking status: Former     Packs/day: 1 00     Years: 20 00     Pack years: 20 00     Types: Cigarettes   • Smokeless tobacco: Never   • Tobacco comments:     20 years ago     Vaping Use   • Vaping Use: Never used   Substance and Sexual Activity   • Alcohol use: Never   • Drug use: Never   • Sexual activity: Not Currently     Partners: Male     Birth control/protection: Post-menopausal   Other Topics Concern   • Not on file   Social History Narrative   • Not on file     Social Determinants of Health     Financial Resource Strain: Not on file   Food Insecurity: Not on file   Transportation Needs: Not on file   Physical Activity: Not on file   Stress: Not on file   Social Connections: Not on file   Intimate Partner "Violence: Not on file   Housing Stability: Not on file       Family History   Problem Relation Age of Onset   • Arrhythmia Mother         atrial flutter   • Skin cancer Mother    • Hypertension Father    • Hyperlipidemia Father    • Atrial fibrillation Sister    • Atrial fibrillation Brother    • Other Brother         sarosis of liver   • Heart attack Paternal Uncle    • Breast cancer Maternal Grandmother    • Throat cancer Maternal Grandfather    • Stroke Neg Hx    • Anuerysm Neg Hx    • Clotting disorder Neg Hx    • Heart failure Neg Hx    • Coronary artery disease Neg Hx        Physical Exam  Vitals and nursing note reviewed  Constitutional:       General: She is not in acute distress  Appearance: She is obese  She is not diaphoretic  HENT:      Head: Normocephalic and atraumatic  Eyes:      Conjunctiva/sclera: Conjunctivae normal    Cardiovascular:      Rate and Rhythm: Normal rate and regular rhythm  Pulses: Intact distal pulses  Heart sounds: Normal heart sounds  Pulmonary:      Effort: Pulmonary effort is normal       Breath sounds: Decreased breath sounds present  Abdominal:      General: Bowel sounds are normal       Palpations: Abdomen is soft  Musculoskeletal:         General: Normal range of motion  Cervical back: Normal range of motion and neck supple  Right lower leg: Edema present  Left lower leg: Edema present  Skin:     General: Skin is warm and dry  Neurological:      Mental Status: She is alert and oriented to person, place, and time  Vitals: Blood pressure 145/89, pulse 93, height 5' 8\" (1 727 m), weight 98 3 kg (216 lb 12 8 oz), SpO2 98 %     Wt Readings from Last 3 Encounters:   05/17/23 98 3 kg (216 lb 12 8 oz)   04/28/23 99 1 kg (218 lb 6 4 oz)   04/19/23 97 1 kg (214 lb)         Labs & Results:  Lab Results   Component Value Date    WBC 7 45 05/13/2023    HGB 10 9 (L) 05/13/2023    HCT 34 8 05/13/2023    MCV 94 05/13/2023     " 05/13/2023     BNP   Date Value Ref Range Status   05/13/2023 68 0 - 100 pg/mL Final   04/15/2023 24 0 - 100 pg/mL Final     No components found for: CHEM  No results found for: Vickki Must, TROPONINT, CKMBINDEX  No results found for this or any previous visit  No results found for this or any previous visit  This note was completed in part utilizing m-modal fluency direct voice recognition software  Grammatical errors, random word insertion, spelling mistakes, and incomplete sentences may be an occasional consequence of the system secondary to software limitations, ambient noise and hardware issues  At the time of dictation, efforts were made to edit, clarify and /or correct errors  Please read the chart carefully and recognize, using context, where substitutions have occurred  If you have any questions or concerns about the context, text or information contained within the body of this dictation, please contact myself, the provider, for further clarification        FRANKLYN Lea  5/17/2023  9:06 AM  Sign [facility]  Cardiology  Hospital Follow Up   Office Visit Note  Claudia Pizarro   57 y o    female   MRN: [id number]  [address]  [address] [place] [address]    PCP: Prince Kaylin MD  Cardiologist: Dr Ella Taylor                  Summary of recommendations  -DASH diet  She has been consuming excess sodium, crackers, cottage cheese  She is retaining fluid  I educated her how to read a full liquid to facilitate adherence   -Echocardiogram-scheduled  -Add lisinopril HCTZ 20/25 mg- one half tablet daily ( had been on hold tablet in the past)  -Hydrate-- 64 oz H20 a day  -Non Fasting BMP 1- 2 weeks  -Periodic home BP measurement  This especially important considering on adding back medication  needs to bring machine in for validation  -Stress echo given CP, recent ED visit  -Lipid profile with direct LDL 1mo    Recently initiated on a statin  -Aggressive CV risk factor modification, A1C is > 14  -Follow up will be scheduled with Dr Bobby Salomon 6/21/23        Assessment/plan  Chest pain  Recent ED admission  Troponins negative  Her blood pressure was elevated  Some typical, some atypical symptoms  She has several CV risk factors  We will pursue a stress echocardiogram   I asked her to hold the beta-blocker the morning of the test  Will enhance therapy for her BP  She does have evidence of volume  Overload which may be contributing  PAF  • Very remote episode in the setting of severe obesity, without recurrence  Hypertension, essential   BP  145/89  Toprol 25 mg daily  We will add lisinopril/HCTZ 20/25 1/2 tablet daily  In the past she was taking a full tablet -Nonfasting BMP in a couple weeks  Encouraged hydration  Hyperlipidemia, recently started on atorvastatin 20 mg daily in April 2023 4/17/2023: Calculated   Heart healthy diet  Reassess   Latest Reference Range & Units 09/13/14 08:00 04/25/15 08:11 08/01/15 07:46 03/24/16 07:26 03/09/19 07:05 04/17/23 09:08   Cholesterol See Comment mg/dL 188 142 149 166 214 (H) 182   Triglycerides See Comment mg/dL 94 64 73 89 119 173 (H)   HDL >=50 mg/dL 61 50 59 50 58 38 (L)   Non-HDL Cholesterol mg/dl     156    LDL Calculated 0 - 100 mg/dL 108 (H) 79 75 98 132 (H) 109 (H)   T2DM  4/15/23: HgA1c > 14, now on insulin, metformin  Following with Endo  Obesity, Hx Gastric bypasss  BMI is 32  Regained weight  In 2021 was up to 240 lbs  Has lost, and is now 216 lb  Cardiac testing  • TTE will be ordered                  HPI   Anish Bond is a 57 y o  female with HTN, DM, obesity, status post Tramaine en Ygastric bypass years ago  After gastric bypass, she came off her oral antihyperglycemic agents  She has a history of PAF in the setting of obesity  She had no recurrence    She last saw Dr Bobby Salomon 3/2021 and she was noted to be regaining weight and was up to 240 pounds, status post gastric bypass  Her blood pressure was controlled  She was advised mechanisms to lose weight    Adm 4/15-4/17/23  CC: low BP, weakness, fatigue x 1 week  BS > 700, ANA, creatinine 1 5  followed by ada, started on insulin, IV fluids  She was previously on metformin but was stopped as her blood sugars improved following weight loss  She lost follow-up with her PCP given [facility]  Lisinopril/HCTZ stopped  EKG: Initial concern for pericarditis with ST elevation  However no clinical symptoms  Not followed by cardiology  Continued on Toprol  She had pseudohyponatremia which improved with improved glucose control    4/17/23  Hospital follow-up  She is accompanied by her   She was found to have marked hyperglycemia with an A1c greater than 14  She is now on insulin  She tells me since discharge, she is feeling a significant improvement as compared to prior  Her lightheadedness, dizziness, and extreme fatigue have all resolved  She does have some visual changes that are improving with time  She denies chest pain, or palpitations  She had no recurrence of A-fib  She was seen by a dietitian in the hospital   She is continuing on her journey of what are appropriate foods, given her prior gastric bypass, and now diabetes with hyperglycemia, requiring insulin  Blood pressure by me 128/84 on Toprol 25 mg daily  Since her last visit with Dr Florencia Sood, she is down to 211 pounds  For now lisinopril has been held  Given her diabetes she would likely benefit from an ACE inhibitor  For now, we will leave off until we have some additional blood pressure data    Interval history  ED 5/15/23  CC: intermittent  chest pain associated symptoms  Patient reported she felt it was likely musculoskeletal due to pain starting when she is trying to do something with her left hand or walking with something in the left hand    PE significant for LE edema  Work-up unremarkable including troponins x 2 and BNP  BP 174/97  DCd to OP F/U    5/17/23  ED Hospital follow-up  She is accompanied by her   Recently, she bred puppies  Seven were delivered, the morning that she went to the emergency room  Prior to the delivery, she was lying on her side to assess the puppies heart rates, etc  with an ultrasound machine  She was in awkward positions  She was bending over and doing a lot of physical work  With these activities she noted anterior chest heaviness, reccurent  This prompted her admission  Troponins  were negative; her blood pressure was elevated as above  Her EKG showed normal sinus rhythm with lateral ST depressions; this was reviewed personally  Millie Hightower She was discharged to follow-up as an outpatient  Since discharge she has had some recurrence of chest heaviness with activities  Other times she has been able to exert herself without the chest heaviness  She does note she has had the development of lower extremity edema of recent  She did see a dietitian  She has been eating things like cottage cheese and some saltine crackers  Unfortunately, she has developed lower extremity edema as a result  I reviewed how to read food labels and asked her to try to avoid dietary sodium  Today, her blood pressure is 145/89  I will add back lisinopril/HCTZ 20/25 which she was on in the past   However I asked her to take only half tablet as she is now on metoprolol  We will also get labs in the near future  I asked her to monitor her blood pressure at home  I emphasized the importance of this  Given that she has busy with her puppies, she had not been checking it at home  I ordered a stress echocardiogram given her significant CV risk factors: Uncontrolled diabetes, mixed dyslipidemia, hypertension  She may have some chest discomfort related to volume    She gained 2 pounds recently          I have spent 25 minutes with Patient and family today in which greater than 50% of this time was spent in counseling/coordination of care regarding Instructions for management, Patient and family education, Importance of tx compliance, Risk factor reductions, Documenting in the medical record, Reviewing / ordering tests, medicine, procedures   and Obtaining or reviewing history    Assessment  Diagnoses and all orders for this visit:    Chest pain, unspecified type  -     Echo stress test, exercise; Future    Benign essential hypertension  -     lisinopril-hydrochlorothiazide (PRINZIDE,ZESTORETIC) 20-25 MG per tablet; Take 0 5 tablets by mouth daily  -     Basic metabolic panel; Future  -     Echo stress test, exercise; Future    Paroxysmal atrial fibrillation (HCC)    Mixed hyperlipidemia  -     Lipid Panel With Direct LDL; Future    Pure hypercholesterolemia  -     atorvastatin (LIPITOR) 20 mg tablet; Take 1 tablet (20 mg total) by mouth daily  -     Echo stress test, exercise; Future    Class 1 obesity due to excess calories with body mass index (BMI) of 32 0 to 32 9 in adult, unspecified whether serious comorbidity present          Past Medical History:   Diagnosis Date   • Diabetes mellitus ([address] ) 4 2023   • Hypertension    • Obesity     Had weight trell surgery       Review of Systems   Constitutional: Negative for chills  Cardiovascular: Positive for chest pain and leg swelling  Negative for claudication, cyanosis, dyspnea on exertion, irregular heartbeat, near-syncope, orthopnea, palpitations, paroxysmal nocturnal dyspnea and syncope  See HPI   Respiratory: Negative for cough and shortness of breath  Gastrointestinal: Negative for heartburn and nausea  Neurological: Negative for dizziness, focal weakness, headaches, light-headedness and weakness  All other systems reviewed and are negative  Allergies   Allergen Reactions   • Other Other (See Comments)     No Nsaids due to weight loss surgery            Current Outpatient Medications:   •  atorvastatin (LIPITOR) 20 mg tablet, Take 1 tablet (20 mg total) by mouth daily, Disp: 90 tablet, Rfl: 1  •  Blood Glucose Monitoring Suppl (OneTouch Verio Reflect) w/Device KIT, May substitute brand based on insurance coverage  Check glucose ACHS  , Disp: 1 kit, Rfl: 0  •  calcium citrate (CALCITRATE) 950 MG tablet, Take 1 tablet by mouth daily, Disp: , Rfl:   •  cholecalciferol (VITAMIN D3) 1,000 units tablet, Take 1,000 Units by mouth daily, Disp: , Rfl:   •  cyanocobalamin 100 MCG tablet, Take 100 mcg by mouth every other day , Disp: , Rfl:   •  glucose blood (OneTouch Verio) test strip, May substitute brand based on insurance coverage  Check glucose ACHS  , Disp: 200 each, Rfl: 0  •  insulin degludec Radonna Lukes FlexTouch) 100 units/mL injection pen, 10 units daily  Dose change, Disp: 15 mL, Rfl: 0  •  Insulin Pen Needle (BD Pen Needle Trudy 2nd Gen) 32G X 4 MM MISC, For use with insulin pen  Pharmacy may dispense brand covered by insurance , Disp: 100 each, Rfl: 0  •  lisinopril-hydrochlorothiazide (PRINZIDE,ZESTORETIC) 20-25 MG per tablet, Take 0 5 tablets by mouth daily, Disp: , Rfl:   •  metFORMIN (GLUCOPHAGE) 500 mg tablet, 1 tab bid, Disp: 60 tablet, Rfl: 1  •  metoprolol succinate (TOPROL-XL) 25 mg 24 hr tablet, TAKE ONE TABLET BY MOUTH ONCE DAILY, Disp: 90 tablet, Rfl: 0  •  multivitamin (THERAGRAN) TABS, Take 1 tablet by mouth daily, Disp: , Rfl:   •  OneTouch Delica Lancets 51G MISC, May substitute brand based on insurance coverage  Check glucose ACHS  , Disp: 200 each, Rfl: 0        Social History     Socioeconomic History   • Marital status: /Civil Union     Spouse name: Not on file   • Number of children: Not on file   • Years of education: Not on file   • Highest education level: Not on file   Occupational History   • Not on file   Tobacco Use   • Smoking status: Former     Packs/day: 1 00     Years: 20 00     Pack years: 20 00     Types: Cigarettes   • Smokeless tobacco: Never   • Tobacco comments:     20 years ago     Vaping Use   • Vaping Use: Never used   Substance and Sexual Activity   • Alcohol use: Never   • Drug use: Never   • Sexual activity: Not Currently     Partners: Male     Birth control/protection: Post-menopausal   Other Topics Concern   • Not on file   Social History Narrative   • Not on file     Social Determinants of Health     Financial Resource Strain: Not on file   Food Insecurity: Not on file   Transportation Needs: Not on file   Physical Activity: Not on file   Stress: Not on file   Social Connections: Not on file   Intimate Partner Violence: Not on file   Housing Stability: Not on file       Family History   Problem Relation Age of Onset   • Arrhythmia Mother         atrial flutter   • Skin cancer Mother    • Hypertension Father    • Hyperlipidemia Father    • Atrial fibrillation Sister    • Atrial fibrillation Brother    • Other Brother         sarosis of liver   • Heart attack Paternal Uncle    • Breast cancer Maternal Grandmother    • Throat cancer Maternal Grandfather    • Stroke Neg Hx    • Anuerysm Neg Hx    • Clotting disorder Neg Hx    • Heart failure Neg Hx    • Coronary artery disease Neg Hx        Physical Exam  Vitals and nursing note reviewed  Constitutional:       General: She is not in acute distress  Appearance: She is obese  She is not diaphoretic  HENT:      Head: Normocephalic and atraumatic  Eyes:      Conjunctiva/sclera: Conjunctivae normal    Cardiovascular:      Rate and Rhythm: Normal rate and regular rhythm  Pulses: Intact distal pulses  Heart sounds: Normal heart sounds  Pulmonary:      Effort: Pulmonary effort is normal       Breath sounds: Decreased breath sounds present  Abdominal:      General: Bowel sounds are normal       Palpations: Abdomen is soft  Musculoskeletal:         General: Normal range of motion  Cervical back: Normal range of motion and neck supple  Right lower leg: Edema present  Left lower leg: Edema present  Skin:     General: Skin is warm and dry  "  Neurological:      Mental Status: She is alert and oriented to person, place, and time  Vitals: Blood pressure 145/89, pulse 93, height 5' 8\" (1 727 m), weight 98 3 kg (216 lb 12 8 oz), SpO2 98 %  Wt Readings from Last 3 Encounters:   05/17/23 98 3 kg (216 lb 12 8 oz)   04/28/23 99 1 kg (218 lb 6 4 oz)   04/19/23 97 1 kg (214 lb)         Labs & Results:  Lab Results   Component Value Date    WBC 7 45 05/13/2023    HGB 10 9 (L) 05/13/2023    HCT 34 8 05/13/2023    MCV 94 05/13/2023     05/13/2023     BNP   Date Value Ref Range Status   05/13/2023 68 0 - 100 pg/mL Final   04/15/2023 24 0 - 100 pg/mL Final     No components found for: CHEM  No results found for: Stormy Single, TROPONINT, CKMBINDEX  No results found for this or any previous visit  No results found for this or any previous visit  This note was completed in part utilizing m-modal fluency direct voice recognition software  Grammatical errors, random word insertion, spelling mistakes, and incomplete sentences may be an occasional consequence of the system secondary to software limitations, ambient noise and hardware issues  At the time of dictation, efforts were made to edit, clarify and /or correct errors  Please read the chart carefully and recognize, using context, where substitutions have occurred    If you have any questions or concerns about the context, text or information contained within the body of this dictation, please contact myself, the provider, for further clarification        " 31-Jan-2025 16:59

## 2025-01-31 NOTE — H&P
History and Physical - [facility]  Amanda Benavidez 64 y.o. female MRN: [id number]    HPI: Amanda Benavidez is a 64 y.o. year old female who presents with follow up of gastric ulcer      Review of Systems    Historical Information   Past Medical History:   Diagnosis Date    Abnormal ECG     Anemia     Diabetes mellitus (HCC) 4     History of stomach ulcers     Hypertension     Obesity     Had weight trell surgery     Past Surgical History:   Procedure Laterality Date    BARIATRIC SURGERY  2015    CARDIAC CATHETERIZATION N/A 2023    Procedure: Cardiac Coronary Angiogram;  Surgeon: Syed Baltazar DO;  Location: [facility];  Service: Cardiology    CARDIAC CATHETERIZATION N/A 2023    Procedure: Cardiac pci;  Surgeon: Syed Baltazar DO;  Location: [facility];  Service: Cardiology     SECTION      32 years ago (2019)     Social History   Social History     Substance and Sexual Activity   Alcohol Use Never     Social History     Substance and Sexual Activity   Drug Use Never     Social History     Tobacco Use   Smoking Status Former    Current packs/day: 0.00    Average packs/day: 1 pack/day for 20.0 years (20.0 ttl pk-yrs)    Types: Cigarettes    Start date:     Quit date:     Years since quittin.0   Smokeless Tobacco Never   Tobacco Comments    20 years ago.     Family History   Problem Relation Age of Onset    Breast cancer Mother 50    Arrhythmia Mother         atrial flutter    Skin cancer Mother     Hypertension Father     Hyperlipidemia Father     Brain cancer Father     Breast cancer Sister 40    Atrial fibrillation Sister     No Known Problems Sister     Atrial fibrillation Brother     No Known Problems Brother     No Known Problems Brother     No Known Problems Brother     No Known Problems Brother     No Known Problems Brother     No Known Problems Brother     No Known Problems Daughter     Breast cancer Maternal Grandmother 50     "Throat cancer Maternal Grandfather     No Known Problems Paternal Grandmother     No Known Problems Paternal Grandfather     Breast cancer Maternal Aunt 50    No Known Problems Maternal Aunt     No Known Problems Paternal Aunt     No Known Problems Paternal Aunt     No Known Problems Paternal Aunt     No Known Problems Paternal Aunt     Heart attack Paternal Uncle     Stroke Neg Hx     Anuerysm Neg Hx     Clotting disorder Neg Hx     Heart failure Neg Hx     Coronary artery disease Neg Hx        Meds/Allergies     Not in a hospital admission.    Allergies   Allergen Reactions    Nsaids Other (See Comments)     Weight loss surgery advise to not take        Objective     /64   Pulse 87   Temp 98.1 °F (36.7 °C) (Temporal)   Resp 16   Ht 5' 8\" (1.727 m)   Wt 82.6 kg (182 lb)   SpO2 96%   BMI 27.67 kg/m²       PHYSICAL EXAM    Gen: NAD  CV: RRR  CHEST: Clear  ABD: soft, NT/ND  EXT: no edema  Neuro: AAO      ASSESSMENT/PLAN:  This is a 64 y.o. year old female here for  follow up of gastric ulcer    PLAN:   Procedure: egd      "

## 2025-02-10 ENCOUNTER — OFFICE VISIT (OUTPATIENT)
Dept: CARDIOLOGY CLINIC | Facility: MEDICAL CENTER | Age: 65
End: 2025-02-10
Payer: COMMERCIAL

## 2025-02-10 VITALS
OXYGEN SATURATION: 99 % | HEART RATE: 93 BPM | BODY MASS INDEX: 28.49 KG/M2 | SYSTOLIC BLOOD PRESSURE: 138 MMHG | DIASTOLIC BLOOD PRESSURE: 80 MMHG | HEIGHT: 68 IN | WEIGHT: 188 LBS

## 2025-02-10 DIAGNOSIS — E11.29 TYPE 2 DIABETES MELLITUS WITH ALBUMINURIA  (HCC): ICD-10-CM

## 2025-02-10 DIAGNOSIS — D62 ACUTE BLOOD LOSS ANEMIA: ICD-10-CM

## 2025-02-10 DIAGNOSIS — I10 BENIGN ESSENTIAL HYPERTENSION: ICD-10-CM

## 2025-02-10 DIAGNOSIS — E78.00 PURE HYPERCHOLESTEROLEMIA: Primary | ICD-10-CM

## 2025-02-10 DIAGNOSIS — R80.9 TYPE 2 DIABETES MELLITUS WITH ALBUMINURIA  (HCC): ICD-10-CM

## 2025-02-10 DIAGNOSIS — I48.0 PAROXYSMAL ATRIAL FIBRILLATION (HCC): ICD-10-CM

## 2025-02-10 DIAGNOSIS — E11.65 TYPE 2 DIABETES MELLITUS WITH HYPERGLYCEMIA, WITHOUT LONG-TERM CURRENT USE OF INSULIN (HCC): ICD-10-CM

## 2025-02-10 DIAGNOSIS — I25.10 CORONARY ARTERY DISEASE INVOLVING NATIVE CORONARY ARTERY OF NATIVE HEART WITHOUT ANGINA PECTORIS: ICD-10-CM

## 2025-02-10 PROBLEM — Z95.5 STATUS POST INSERTION OF DRUG ELUTING CORONARY ARTERY STENT: Status: RESOLVED | Noted: 2023-06-05 | Resolved: 2025-02-10

## 2025-02-10 PROCEDURE — 99214 OFFICE O/P EST MOD 30 MIN: CPT | Performed by: INTERNAL MEDICINE

## 2025-02-10 NOTE — ASSESSMENT & PLAN NOTE
Presented 10/24 with a hemoglobin of 4.2 requiring transfusion x 3, found to have a bleeding gastrojejunal junctional ulcer  Healed on most recent follow-up EGD just a week ago  Continues on iron supplementation, hemoglobin up to 10.2

## 2025-02-10 NOTE — PROGRESS NOTES
[facility] Cardiology  Follow up note  Amanda Benavidez 64 y.o. female MRN: [id number]        Assessment & Plan  Paroxysmal atrial fibrillation (HCC)  A single remote episode occurred in the context of morbid obesity prior to bariatric surgery and has never recurred.  Type 2 diabetes mellitus with albuminuria  (HCC)  Lab Results   Component Value Date    HGBA1C 8.3 (A) 08/09/2024     Pure hypercholesterolemia  Overdue for follow-up, but last LDL was well-controlled at 63  Coronary artery disease involving native coronary artery of native heart without angina pectoris  Angina 6/23 with discovery of 95% tandem LAD lesion status post drug-eluting stent x 1  Completed more than a year of dual antiplatelet therapy  On maintenance aspirin at this time.  Benign essential hypertension  Blood pressure under reasonable control at this time.  Type 2 diabetes mellitus with hyperglycemia, without long-term current use of insulin (HCC)    Lab Results   Component Value Date    HGBA1C 8.3 (A) 08/09/2024   Not well-controlled  Acute blood loss anemia  Presented 10/24 with a hemoglobin of 4.2 requiring transfusion x 3, found to have a bleeding gastrojejunal junctional ulcer  Healed on most recent follow-up EGD just a week ago  Continues on iron supplementation, hemoglobin up to 10.2       Plan:    1 year follow-up recommended, endocrinology has ordered repeat blood work including lipids        HPI:   Amanda Benavidez is a 64 y.o. year old female with a history of severe obesity status post bariatric surgery remotely, a remote history of PAF in the setting of obesity without any recurrence, hypertension and hyperlipidemia, severely uncontrolled diabetes with an A1c up to 14 in early 2023, coronary artery disease discovered in that setting with unstable anginal symptoms that prompted workup 6/23 discovering 95% tandem proximal LAD lesion status post drug-eluting stent x 1.    She presented 10/24 to the ER with shortness of breath, dizziness,  hemoglobin 4.2, found to have a GJ junctional ulcer on endoscopy, underwent packed red blood cell transfusion x 3, hemoglobin now up to 10.2 on iron supplementation, and follow-up EGD just a week ago showed healed ulcer with some mild erythema.  Blood pressure is borderline elevated, typically has been well-controlled  She denies any anginal symptoms  Lipids been well-controlled, LDL 63, but overdue for follow-up and already ordered by endocrinology.  She continues on aspirin 81 mg daily without melena.      Review of Systems   Constitutional:  Negative for appetite change, diaphoresis, fatigue and fever.   Respiratory:  Negative for chest tightness, shortness of breath and wheezing.    Cardiovascular:  Negative for chest pain, palpitations and leg swelling.   Gastrointestinal:  Negative for abdominal pain and blood in stool.   Musculoskeletal:  Negative for arthralgias and joint swelling.   Skin:  Negative for rash.   Neurological:  Negative for dizziness, syncope and light-headedness.         Past Medical History:   Diagnosis Date   • Abnormal ECG    • Anemia    • Diabetes mellitus (HCC) 4    • History of stomach ulcers    • Hypertension    • Obesity     Had weight trell surgery     Social History     Substance and Sexual Activity   Alcohol Use Never     Social History     Substance and Sexual Activity   Drug Use Never     Social History     Tobacco Use   Smoking Status Former   • Current packs/day: 0.00   • Average packs/day: 1 pack/day for 20.0 years (20.0 ttl pk-yrs)   • Types: Cigarettes   • Start date:    • Quit date:    • Years since quittin.1   Smokeless Tobacco Never   Tobacco Comments    20 years ago.       Allergies:  Allergies   Allergen Reactions   • Nsaids Other (See Comments)     Weight loss surgery advise to not take        Medications:     Current Outpatient Medications:   •  aspirin 81 mg chewable tablet, CHEW AND SWALLOW ONE TABLET BY MOUTH ONCE DAILY, Disp: 90 tablet, Rfl:  0  •  atorvastatin (LIPITOR) 40 mg tablet, Take 1 tablet (40 mg total) by mouth daily, Disp: 90 tablet, Rfl: 3  •  calcium citrate (CALCITRATE) 950 MG tablet, Take 1 tablet by mouth daily, Disp: , Rfl:   •  cholecalciferol (VITAMIN D3) 1,000 units tablet, Take 1,000 Units by mouth daily, Disp: , Rfl:   •  glucose blood (FREESTYLE LITE) test strip, Use as instructed, Disp: 300 strip, Rfl: 1  •  insulin degludec (Tresiba FlexTouch) 100 units/mL injection pen, INJECT 10 UNITS UNDER THE SKIN DAILY, Disp: 15 mL, Rfl: 1  •  Insulin Pen Needle (BD Pen Needle Trudy U/F) 32G X 4 MM MISC, FOR USE WITH INSULIN PEN, Disp: 100 each, Rfl: 1  •  Lancets (freestyle) lancets, Use as instructed, Disp: 300 each, Rfl: 1  •  lisinopril-hydrochlorothiazide (PRINZIDE,ZESTORETIC) 10-12.5 MG per tablet, Take 1 tablet by mouth daily, Disp: 90 tablet, Rfl: 6  •  metFORMIN (GLUCOPHAGE) 500 mg tablet, TAKE 1 TABLET BY MOUTH TWICE A DAY, Disp: 180 tablet, Rfl: 1  •  metoprolol succinate (TOPROL-XL) 25 mg 24 hr tablet, Take 1 tablet (25 mg total) by mouth daily, Disp: 90 tablet, Rfl: 3  •  multivitamin (THERAGRAN) TABS, Take 1 tablet by mouth daily, Disp: , Rfl:   •  pantoprazole (PROTONIX) 40 mg tablet, Take 1 tablet (40 mg total) by mouth 2 (two) times a day before meals, Disp: 90 tablet, Rfl: 3  •  cyanocobalamin 100 MCG tablet, Take 100 mcg by mouth daily (Patient not taking: Reported on 11/11/2024), Disp: , Rfl:       Vitals:    02/10/25 1602   BP: 138/80   Pulse: 93   SpO2: 99%       Weight (last 2 days)     Date/Time Weight    02/10/25 1602 85.3 (188)        Physical Exam  Constitutional:       General: She is not in acute distress.     Appearance: Normal appearance. She is not diaphoretic.   HENT:      Head: Normocephalic and atraumatic.   Eyes:      General: No scleral icterus.     Conjunctiva/sclera: Conjunctivae normal.   Neck:      Vascular: No JVD.   Cardiovascular:      Rate and Rhythm: Normal rate and regular rhythm.      Heart  "sounds: Normal heart sounds. No murmur heard.  Pulmonary:      Effort: Pulmonary effort is normal. No respiratory distress.      Breath sounds: Normal breath sounds. No wheezing, rhonchi or rales.   Musculoskeletal:         General: No tenderness.      Right lower leg: Normal. No edema.      Left lower leg: Normal. No edema.   Skin:     General: Skin is warm and dry.   Neurological:      Mental Status: She is alert. Mental status is at baseline.           Laboratory Studies:  NT-proBNP: No results for input(s): \"NTBNP\" in the last 72 hours.   Chemistries: No results for input(s): \"NA\", \"K\", \"CL\", \"CO2\", \"BUN\" in the last 72 hours.    Invalid input(s): \"CREA\", \"GLU\"  Lipid Profile:   Lab Results   Component Value Date    CHOL 149 08/01/2015    CHOL 142 04/25/2015    CHOL 188 09/13/2014     Lab Results   Component Value Date    HDL 41 (L) 12/29/2023    HDL 45 (L) 07/13/2023    HDL 48 (L) 06/06/2023     Lab Results   Component Value Date    LDLCALC 63 12/29/2023    LDLCALC 71 07/13/2023    LDLCALC 84 06/06/2023     Lab Results   Component Value Date    TRIG 129 12/29/2023    TRIG 88 07/13/2023    TRIG 81 06/06/2023     Lab Results   Component Value Date    CHOL 149 08/01/2015    LDLDIRECT 77 06/06/2023    HDL 41 (L) 12/29/2023    HDL 59 08/01/2015    TRIG 129 12/29/2023    TRIG 73 08/01/2015     No results for input(s): \"CHOL\", \"LDLDIRECT\", \"HDL\", \"TRIG\" in the last 72 hours.    Cardiac testing:     EKG reviewed personally:    No results found for this visit on 02/10/25.        Echocardiogram  5/23-personally reviewed-EF normal, no regional wall motion abnormality, normal diastolic dysfunction, no significant valve disease.    Stress Test  5/23-abnormal stress echo with anterior apical ischemia    Cardiac Cath  6/5/23-95% mid LAD status post drug-eluting stent    Luis Fisher MD    Portions of the record may have been created with voice recognition software.  Occasional wrong word or \"sound a like\" substitutions may " have occurred due to the inherent limitations of voice recognition software.  Read the chart carefully and recognize, using context, where substitutions have occurred.

## 2025-02-10 NOTE — ASSESSMENT & PLAN NOTE
Angina 6/23 with discovery of 95% tandem LAD lesion status post drug-eluting stent x 1  Completed more than a year of dual antiplatelet therapy  On maintenance aspirin at this time.

## 2025-02-12 ENCOUNTER — RESULTS FOLLOW-UP (OUTPATIENT)
Dept: GASTROENTEROLOGY | Facility: AMBULARY SURGERY CENTER | Age: 65
End: 2025-02-12

## 2025-02-14 ENCOUNTER — OFFICE VISIT (OUTPATIENT)
Dept: FAMILY MEDICINE CLINIC | Facility: MEDICAL CENTER | Age: 65
End: 2025-02-14
Payer: COMMERCIAL

## 2025-02-14 VITALS
HEIGHT: 68 IN | RESPIRATION RATE: 16 BRPM | DIASTOLIC BLOOD PRESSURE: 82 MMHG | WEIGHT: 187.8 LBS | BODY MASS INDEX: 28.46 KG/M2 | OXYGEN SATURATION: 100 % | SYSTOLIC BLOOD PRESSURE: 124 MMHG | TEMPERATURE: 98.3 F | HEART RATE: 73 BPM

## 2025-02-14 DIAGNOSIS — R80.9 TYPE 2 DIABETES MELLITUS WITH ALBUMINURIA  (HCC): ICD-10-CM

## 2025-02-14 DIAGNOSIS — E78.00 PURE HYPERCHOLESTEROLEMIA: ICD-10-CM

## 2025-02-14 DIAGNOSIS — E61.1 IRON DEFICIENCY: ICD-10-CM

## 2025-02-14 DIAGNOSIS — E11.29 TYPE 2 DIABETES MELLITUS WITH ALBUMINURIA  (HCC): ICD-10-CM

## 2025-02-14 DIAGNOSIS — E55.9 VITAMIN D DEFICIENCY: ICD-10-CM

## 2025-02-14 DIAGNOSIS — E53.8 VITAMIN B12 DEFICIENCY: ICD-10-CM

## 2025-02-14 DIAGNOSIS — I25.10 CORONARY ARTERY DISEASE INVOLVING NATIVE CORONARY ARTERY OF NATIVE HEART WITHOUT ANGINA PECTORIS: ICD-10-CM

## 2025-02-14 DIAGNOSIS — I48.0 PAROXYSMAL ATRIAL FIBRILLATION (HCC): ICD-10-CM

## 2025-02-14 DIAGNOSIS — I10 BENIGN ESSENTIAL HYPERTENSION: ICD-10-CM

## 2025-02-14 DIAGNOSIS — Z09 FOLLOW-UP EXAM, 3-6 MONTHS SINCE PREVIOUS EXAM: Primary | ICD-10-CM

## 2025-02-14 DIAGNOSIS — D50.9 IRON DEFICIENCY ANEMIA, UNSPECIFIED IRON DEFICIENCY ANEMIA TYPE: ICD-10-CM

## 2025-02-14 PROCEDURE — 99214 OFFICE O/P EST MOD 30 MIN: CPT | Performed by: STUDENT IN AN ORGANIZED HEALTH CARE EDUCATION/TRAINING PROGRAM

## 2025-02-14 NOTE — ASSESSMENT & PLAN NOTE
Follows with cardiology  Hypertension well-controlled with current regimen  Continue daily statin and daily aspirin

## 2025-02-14 NOTE — ASSESSMENT & PLAN NOTE
Continue iron supplementation  Following with GI  Recent CBC reviewed, repeat CBC in 3 months

## 2025-02-14 NOTE — ASSESSMENT & PLAN NOTE
Lab Results   Component Value Date    HGBA1C 8.3 (A) 08/09/2024   Managed by endocrinology  Daily ACE inhibitor and daily statin

## 2025-02-14 NOTE — ASSESSMENT & PLAN NOTE
Well-controlled, continue Toprol-XL 25 mg p.o. daily, lisinopril-hydrochlorothiazide 10 -12.5 mg/tab. 1 tablet p.o. daily

## 2025-02-22 DIAGNOSIS — K92.2 GI BLEED: ICD-10-CM

## 2025-02-24 ENCOUNTER — TELEPHONE (OUTPATIENT)
Age: 65
End: 2025-02-24

## 2025-02-24 RX ORDER — LANSOPRAZOLE 30 MG/1
CAPSULE, DELAYED RELEASE ORAL
Refills: 0 | OUTPATIENT
Start: 2025-02-24

## 2025-02-26 NOTE — TELEPHONE ENCOUNTER
PA for pantoprazole 40 mg BID    SUBMITTED to [facility]    via      [x][facility]-Case ID # previously seen gastrojejunal anastomotic ulcer has healed there is some erythematous, edematous mucosa     All office notes, labs and other pertaining documents and studies sent. Clinical questions answered. Awaiting determination from insurance company.     Turnaround time for your insurance to make a decision on your Prior Authorization can take 7-21 business days.

## 2025-02-27 NOTE — TELEPHONE ENCOUNTER
PA for pantorpazole APPROVED     Date(s) approved 2/26/2028    Case #[id number]     Patient advised by          []Microsa[employer]art Message  []Phone call   [x]LMOM  []L/M to call office as no active Communication consent on file  []Unable to leave detailed message as VM not approved on Communication consent       Pharmacy advised by    [x]Fax  []Phone call    Specialty Pharmacy    []

## 2025-03-07 ENCOUNTER — RESULTS FOLLOW-UP (OUTPATIENT)
Dept: ENDOCRINOLOGY | Facility: CLINIC | Age: 65
End: 2025-03-07

## 2025-03-07 ENCOUNTER — APPOINTMENT (OUTPATIENT)
Dept: LAB | Facility: MEDICAL CENTER | Age: 65
End: 2025-03-07
Payer: COMMERCIAL

## 2025-03-07 LAB
ALBUMIN SERPL BCG-MCNC: 4 G/DL (ref 3.5–5)
ALP SERPL-CCNC: 85 U/L (ref 34–104)
ALT SERPL W P-5'-P-CCNC: 22 U/L (ref 7–52)
ANION GAP SERPL CALCULATED.3IONS-SCNC: 7 MMOL/L (ref 4–13)
AST SERPL W P-5'-P-CCNC: 21 U/L (ref 13–39)
BILIRUB SERPL-MCNC: 0.33 MG/DL (ref 0.2–1)
BUN SERPL-MCNC: 43 MG/DL (ref 5–25)
CALCIUM SERPL-MCNC: 9.8 MG/DL (ref 8.4–10.2)
CHLORIDE SERPL-SCNC: 105 MMOL/L (ref 96–108)
CHOLEST SERPL-MCNC: 167 MG/DL (ref ?–200)
CO2 SERPL-SCNC: 27 MMOL/L (ref 21–32)
CREAT SERPL-MCNC: 1.32 MG/DL (ref 0.6–1.3)
CREAT UR-MCNC: 61.9 MG/DL
EST. AVERAGE GLUCOSE BLD GHB EST-MCNC: 246 MG/DL
GFR SERPL CREATININE-BSD FRML MDRD: 42 ML/MIN/1.73SQ M
GLUCOSE P FAST SERPL-MCNC: 164 MG/DL (ref 65–99)
HBA1C MFR BLD: 10.2 %
HDLC SERPL-MCNC: 60 MG/DL
LDLC SERPL CALC-MCNC: 90 MG/DL (ref 0–100)
NONHDLC SERPL-MCNC: 107 MG/DL
POTASSIUM SERPL-SCNC: 4.8 MMOL/L (ref 3.5–5.3)
PROT SERPL-MCNC: 7.1 G/DL (ref 6.4–8.4)
PROT UR-MCNC: 27.7 MG/DL
PROT/CREAT UR: 0.4 MG/G{CREAT} (ref 0–0.1)
SODIUM SERPL-SCNC: 139 MMOL/L (ref 135–147)
TRIGL SERPL-MCNC: 83 MG/DL (ref ?–150)
TSH SERPL DL<=0.05 MIU/L-ACNC: 3.16 UIU/ML (ref 0.45–4.5)

## 2025-03-07 PROCEDURE — 83036 HEMOGLOBIN GLYCOSYLATED A1C: CPT

## 2025-03-07 PROCEDURE — 84156 ASSAY OF PROTEIN URINE: CPT

## 2025-03-07 PROCEDURE — 84443 ASSAY THYROID STIM HORMONE: CPT

## 2025-03-07 PROCEDURE — 82570 ASSAY OF URINE CREATININE: CPT

## 2025-03-07 PROCEDURE — 80061 LIPID PANEL: CPT

## 2025-03-07 PROCEDURE — 80053 COMPREHEN METABOLIC PANEL: CPT

## 2025-03-10 ENCOUNTER — OFFICE VISIT (OUTPATIENT)
Dept: ENDOCRINOLOGY | Facility: CLINIC | Age: 65
End: 2025-03-10
Payer: COMMERCIAL

## 2025-03-10 VITALS
SYSTOLIC BLOOD PRESSURE: 126 MMHG | HEART RATE: 68 BPM | DIASTOLIC BLOOD PRESSURE: 80 MMHG | HEIGHT: 68 IN | BODY MASS INDEX: 29.04 KG/M2 | WEIGHT: 191.6 LBS

## 2025-03-10 DIAGNOSIS — R73.9 HYPERGLYCEMIA: ICD-10-CM

## 2025-03-10 DIAGNOSIS — I10 BENIGN ESSENTIAL HYPERTENSION: ICD-10-CM

## 2025-03-10 DIAGNOSIS — E55.9 VITAMIN D DEFICIENCY: ICD-10-CM

## 2025-03-10 DIAGNOSIS — E11.65 TYPE 2 DIABETES MELLITUS WITH HYPERGLYCEMIA, WITHOUT LONG-TERM CURRENT USE OF INSULIN (HCC): Primary | ICD-10-CM

## 2025-03-10 DIAGNOSIS — E78.00 PURE HYPERCHOLESTEROLEMIA: ICD-10-CM

## 2025-03-10 PROCEDURE — 99214 OFFICE O/P EST MOD 30 MIN: CPT | Performed by: PHYSICIAN ASSISTANT

## 2025-03-10 RX ORDER — INSULIN DEGLUDEC 100 U/ML
INJECTION, SOLUTION SUBCUTANEOUS
Qty: 15 ML | Refills: 1 | Status: SHIPPED | OUTPATIENT
Start: 2025-03-10

## 2025-03-10 NOTE — PROGRESS NOTES
Name: Amanda Benavidez      : 1960      MRN: [id number]  Encounter Provider: Payal Garcia PA-C  Encounter Date: 3/10/2025   Encounter department: [facility] [place]    Chief Complaint   Patient presents with    Diabetes Type 2   :  Assessment & Plan  Type 2 diabetes mellitus with hyperglycemia, without long-term current use of insulin (HCC)    Lab Results   Component Value Date    HGBA1C 10.2 (H) 2025   Current HbA1c represents poor control, perhaps unreliable due to anemia. Blood sugars demonstrating AM elevations likely due to dietary indiscretion.   Continue current regimen with the following adjustments:  Increase Metformin to 1000mg BID  Declined diabetic education, reports she knows what she needs to do, just has a compliance issue  Advised to adhere to diabetic diet, and recommended staying active/exercising routinely.  Discussed symptoms and treatment of hypoglycemia.    Recommended routine follow-up with Ophthalmology and foot exams.   Ordered blood work to complete prior to next visit.  Orders:    Fructosamine; Future    metFORMIN (GLUCOPHAGE) 500 mg tablet; Take 2 tablets (1,000 mg total) by mouth 2 (two) times a day    Ambulatory Referral to Ophthalmology; Future    Comprehensive metabolic panel; Future    Hemoglobin A1C; Future    Benign essential hypertension  BP at goal.   Continue current medication regimen.        Vitamin D deficiency  Continue Vitamin D supplement.        Pure hypercholesterolemia  Continue statin.   Check routine lipid panel.        Hyperglycemia    Orders:    insulin degludec (Tresiba FlexTouch) 100 units/mL injection pen; INJECT 10 UNITS UNDER THE SKIN DAILY        History of Present Illness     Amanda Benavidez is a 64 y.o. female with type 2 diabetes, gastric bypass 2010s, HLD, HTN seen in follow up. 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 Denies recent severe hypoglycemic or severe hyperglycemic episodes. Denies any issues with  "her current regimen. Last A1C was 10.2. Denies recent illness, hospitalization or steroid use.     Reports that in October 2024 she had low Hb requiring transfusion. She continues on iron. EGD revealed ulcers as source.    She does admit to dietary indiscretion, sometimes going for more convenient foods as opposed to healthy foods. She admits it is difficult as she has a history of gastric bypass. She reports she knows what she needs to eat, she just struggles with compliance.     Home blood glucometer readings:   Before breakfast:  133 - 170    Current regimen:   Metformin 500mg BID  Tresiba 10u qAM      Last Eye Exam: Not on file - referral provided  Last Foot Exam: 04/19/2023  Health Maintenance   Topic Date Due    Diabetic Eye Exam  Never done    Diabetic Foot Exam  11/15/2025 (Originally 4/19/2024)     Pertinent Medical History   Previously maintained on Jardiance but discontinued due to  symptoms.     Review of Systems   Constitutional:  Negative for fatigue.   Respiratory:  Negative for shortness of breath.    Cardiovascular:  Negative for chest pain and palpitations.   Gastrointestinal:  Negative for abdominal pain, diarrhea, nausea and vomiting.   Endocrine: Negative for polydipsia and polyuria.   All other systems reviewed and are negative.   as per HPI         Medical History Reviewed by provider this encounter:     .    Objective   /80 (BP Location: Left arm, Patient Position: Sitting, Cuff Size: Adult)   Pulse 68   Ht 5' 8\" (1.727 m)   Wt 86.9 kg (191 lb 9.6 oz)   BMI 29.13 kg/m²      Body mass index is 29.13 kg/m².  Wt Readings from Last 3 Encounters:   03/10/25 86.9 kg (191 lb 9.6 oz)   02/14/25 85.2 kg (187 lb 12.8 oz)   02/10/25 85.3 kg (188 lb)     Physical Exam  Cardiovascular:      Pulses: no weak pulses.           Dorsalis pedis pulses are 2+ on the right side and 2+ on the left side.        Posterior tibial pulses are 2+ on the right side and 2+ on the left side.   Feet:      Right " foot:      Skin integrity: Dry skin present. No ulcer, skin breakdown, erythema, warmth or callus.      Left foot:      Skin integrity: Dry skin present. No ulcer, skin breakdown, erythema, warmth or callus.     Patient's shoes and socks removed.    Right Foot/Ankle   Right Foot Inspection  Skin Exam: skin normal, skin intact and dry skin. No warmth, no callus, no erythema, no maceration, no abnormal color, no pre-ulcer, no ulcer and no callus.     Toe Exam: ROM and strength within normal limits.     Sensory   Vibration: intact  Monofilament testing: intact    Vascular  The right DP pulse is 2+. The right PT pulse is 2+.     Left Foot/Ankle  Left Foot Inspection  Skin Exam: skin normal, skin intact and dry skin. No warmth, no erythema, no maceration, normal color, no pre-ulcer, no ulcer and no callus.     Toe Exam: ROM and strength within normal limits.     Sensory   Vibration: intact  Monofilament testing: intact    Vascular  The left DP pulse is 2+. The left PT pulse is 2+.     Assign Risk Category  No deformity present  No loss of protective sensation  No weak pulses  Risk: 0      Labs:   Lab Results   Component Value Date    HGBA1C 10.2 (H) 03/07/2025    HGBA1C 8.3 (A) 08/09/2024    HGBA1C 8.2 (H) 12/06/2023     Lab Results   Component Value Date    CREATININE 1.32 (H) 03/07/2025    CREATININE 1.15 10/27/2024    CREATININE 1.11 10/26/2024    BUN 43 (H) 03/07/2025     08/01/2015    K 4.8 03/07/2025     03/07/2025    CO2 27 03/07/2025     eGFR   Date Value Ref Range Status   03/07/2025 42 ml/min/1.73sq m Final     Lab Results   Component Value Date    CHOL 149 08/01/2015    HDL 60 03/07/2025    TRIG 83 03/07/2025     Lab Results   Component Value Date    ALT 22 03/07/2025    AST 21 03/07/2025    ALKPHOS 85 03/07/2025    BILITOT 0.54 08/01/2015     Lab Results   Component Value Date    DLT0MNDEDAOF 3.156 03/07/2025    TUV3SSXWMUFY 1.660 07/13/2023    [id number] 2.337 04/15/2023       Patient  Instructions   Continue your medications with the following changes:  Increase Metformin to 1,000mg twice daily (can take two pills in AM and one in PM for one week, then increase to two, twice daily aftera week)  Monitor blood sugars regularly  Contact the office with any severe hypoglycemic or hyperglycemic events  Maintain appropriate diet and physical activity  Follow up in 3 months  Call with any questions, concerns, or refill requests  Obtain labs prior to your next appointment      Discussed with the patient and all questioned fully answered. She will call me if any problems arise.

## 2025-03-10 NOTE — PATIENT INSTRUCTIONS
Continue your medications with the following changes:  Increase Metformin to 1,000mg twice daily (can take two pills in AM and one in PM for one week, then increase to two, twice daily aftera week)  Monitor blood sugars regularly  Contact the office with any severe hypoglycemic or hyperglycemic events  Maintain appropriate diet and physical activity  Follow up in 3 months  Call with any questions, concerns, or refill requests  Obtain labs prior to your next appointment

## 2025-03-10 NOTE — ASSESSMENT & PLAN NOTE
Lab Results   Component Value Date    HGBA1C 10.2 (H) 03/07/2025   Current HbA1c represents poor control, perhaps unreliable due to anemia. Blood sugars demonstrating AM elevations likely due to dietary indiscretion.   Continue current regimen with the following adjustments:  Increase Metformin to 1000mg BID  Declined diabetic education, reports she knows what she needs to do, just has a compliance issue  Advised to adhere to diabetic diet, and recommended staying active/exercising routinely.  Discussed symptoms and treatment of hypoglycemia.    Recommended routine follow-up with Ophthalmology and foot exams.   Ordered blood work to complete prior to next visit.  Orders:    Fructosamine; Future    metFORMIN (GLUCOPHAGE) 500 mg tablet; Take 2 tablets (1,000 mg total) by mouth 2 (two) times a day    Ambulatory Referral to Ophthalmology; Future    Comprehensive metabolic panel; Future    Hemoglobin A1C; Future

## 2025-04-07 DIAGNOSIS — I10 BENIGN ESSENTIAL HYPERTENSION: ICD-10-CM

## 2025-04-07 RX ORDER — LISINOPRIL AND HYDROCHLOROTHIAZIDE 10; 12.5 MG/1; MG/1
1 TABLET ORAL DAILY
Qty: 90 TABLET | Refills: 1 | Status: SHIPPED | OUTPATIENT
Start: 2025-04-07

## 2025-04-22 DIAGNOSIS — I42.9 CARDIOMYOPATHY (HCC): ICD-10-CM

## 2025-04-22 RX ORDER — METOPROLOL SUCCINATE 25 MG/1
25 TABLET, EXTENDED RELEASE ORAL DAILY
Qty: 90 TABLET | Refills: 1 | Status: SHIPPED | OUTPATIENT
Start: 2025-04-22

## 2025-04-27 DIAGNOSIS — R73.9 HYPERGLYCEMIA: ICD-10-CM

## 2025-04-27 DIAGNOSIS — E11.65 TYPE 2 DIABETES MELLITUS WITH HYPERGLYCEMIA, WITHOUT LONG-TERM CURRENT USE OF INSULIN (HCC): ICD-10-CM

## 2025-04-29 RX ORDER — LANCETS 28 GAUGE
EACH MISCELLANEOUS
Qty: 300 EACH | Refills: 1 | Status: SHIPPED | OUTPATIENT
Start: 2025-04-29

## 2025-05-21 ENCOUNTER — DOCUMENTATION (OUTPATIENT)
Dept: ADMINISTRATIVE | Facility: OTHER | Age: 65
End: 2025-05-21

## 2025-05-21 NOTE — PROGRESS NOTES
05/21/25 2:46 PM     DM A1c outreach is not required, patient has an upcoming appointment with the PCP office.    Thank you.  Mary Yoon MA  [facility]

## 2025-08-06 DIAGNOSIS — R73.9 HYPERGLYCEMIA: ICD-10-CM

## 2025-08-07 RX ORDER — PEN NEEDLE, DIABETIC 32GX 5/32"
NEEDLE, DISPOSABLE MISCELLANEOUS
Qty: 100 EACH | Refills: 2 | Status: SHIPPED | OUTPATIENT
Start: 2025-08-07

## (undated) DEVICE — GLIDESHEATH SLENDER STAINLESS STEEL KIT: Brand: GLIDESHEATH SLENDER

## (undated) DEVICE — CATH GUIDE LAUNCHER 6FR EBU 3.5

## (undated) DEVICE — GUIDEWIRE WHOLEY HI TORQUE INTERM MOD J .035 145CM

## (undated) DEVICE — TREK CORONARY DILATATION CATHETER 2.50 MM X 15 MM / RAPID-EXCHANGE: Brand: TREK

## (undated) DEVICE — RUNTHROUGH NS EXTRA FLOPPY PTCA GUIDEWIRE: Brand: RUNTHROUGH

## (undated) DEVICE — RADIFOCUS OPTITORQUE ANGIOGRAPHIC CATHETER: Brand: OPTITORQUE